# Patient Record
Sex: FEMALE | Race: BLACK OR AFRICAN AMERICAN | NOT HISPANIC OR LATINO | Employment: OTHER | URBAN - METROPOLITAN AREA
[De-identification: names, ages, dates, MRNs, and addresses within clinical notes are randomized per-mention and may not be internally consistent; named-entity substitution may affect disease eponyms.]

---

## 2018-09-28 LAB
HPV ABSTRACT: NORMAL
PAP-ABSTRACT: NORMAL

## 2018-10-01 ENCOUNTER — TRANSFERRED RECORDS (OUTPATIENT)
Dept: HEALTH INFORMATION MANAGEMENT | Facility: CLINIC | Age: 48
End: 2018-10-01

## 2019-03-22 ENCOUNTER — HEALTH MAINTENANCE LETTER (OUTPATIENT)
Age: 49
End: 2019-03-22

## 2019-04-03 ENCOUNTER — OFFICE VISIT (OUTPATIENT)
Dept: FAMILY MEDICINE | Facility: CLINIC | Age: 49
End: 2019-04-03
Payer: COMMERCIAL

## 2019-04-03 VITALS
TEMPERATURE: 98 F | HEART RATE: 77 BPM | SYSTOLIC BLOOD PRESSURE: 143 MMHG | BODY MASS INDEX: 26.48 KG/M2 | DIASTOLIC BLOOD PRESSURE: 94 MMHG | HEIGHT: 70 IN | WEIGHT: 185 LBS | OXYGEN SATURATION: 100 %

## 2019-04-03 DIAGNOSIS — R03.0 ELEVATED BLOOD-PRESSURE READING WITHOUT DIAGNOSIS OF HYPERTENSION: ICD-10-CM

## 2019-04-03 DIAGNOSIS — Z01.818 PREOP GENERAL PHYSICAL EXAM: Primary | ICD-10-CM

## 2019-04-03 DIAGNOSIS — D50.0 IRON DEFICIENCY ANEMIA DUE TO CHRONIC BLOOD LOSS: ICD-10-CM

## 2019-04-03 LAB
ANION GAP SERPL CALCULATED.3IONS-SCNC: 7 MMOL/L (ref 3–14)
BASOPHILS # BLD AUTO: 0 10E9/L (ref 0–0.2)
BASOPHILS NFR BLD AUTO: 0.4 %
BUN SERPL-MCNC: 11 MG/DL (ref 7–30)
CALCIUM SERPL-MCNC: 9.2 MG/DL (ref 8.5–10.1)
CHLORIDE SERPL-SCNC: 109 MMOL/L (ref 94–109)
CO2 SERPL-SCNC: 24 MMOL/L (ref 20–32)
CREAT SERPL-MCNC: 0.66 MG/DL (ref 0.52–1.04)
DIFFERENTIAL METHOD BLD: ABNORMAL
EOSINOPHIL # BLD AUTO: 0.1 10E9/L (ref 0–0.7)
EOSINOPHIL NFR BLD AUTO: 1.6 %
ERYTHROCYTE [DISTWIDTH] IN BLOOD BY AUTOMATED COUNT: 17.2 % (ref 10–15)
FERRITIN SERPL-MCNC: 4 NG/ML (ref 8–252)
GFR SERPL CREATININE-BSD FRML MDRD: >90 ML/MIN/{1.73_M2}
GLUCOSE SERPL-MCNC: 87 MG/DL (ref 70–99)
HCT VFR BLD AUTO: 35 % (ref 35–47)
HGB BLD-MCNC: 10.9 G/DL (ref 11.7–15.7)
IRON SATN MFR SERPL: 4 % (ref 15–46)
IRON SERPL-MCNC: 16 UG/DL (ref 35–180)
LYMPHOCYTES # BLD AUTO: 1.9 10E9/L (ref 0.8–5.3)
LYMPHOCYTES NFR BLD AUTO: 33.6 %
MCH RBC QN AUTO: 27.5 PG (ref 26.5–33)
MCHC RBC AUTO-ENTMCNC: 31.1 G/DL (ref 31.5–36.5)
MCV RBC AUTO: 88 FL (ref 78–100)
MONOCYTES # BLD AUTO: 0.8 10E9/L (ref 0–1.3)
MONOCYTES NFR BLD AUTO: 13.7 %
NEUTROPHILS # BLD AUTO: 2.8 10E9/L (ref 1.6–8.3)
NEUTROPHILS NFR BLD AUTO: 50.7 %
PLATELET # BLD AUTO: 329 10E9/L (ref 150–450)
POTASSIUM SERPL-SCNC: 4.1 MMOL/L (ref 3.4–5.3)
RBC # BLD AUTO: 3.96 10E12/L (ref 3.8–5.2)
RETICS # AUTO: 61.1 10E9/L (ref 25–95)
RETICS/RBC NFR AUTO: 1.5 % (ref 0.5–2)
SODIUM SERPL-SCNC: 140 MMOL/L (ref 133–144)
TIBC SERPL-MCNC: 373 UG/DL (ref 240–430)
WBC # BLD AUTO: 5.5 10E9/L (ref 4–11)

## 2019-04-03 PROCEDURE — 82728 ASSAY OF FERRITIN: CPT | Performed by: INTERNAL MEDICINE

## 2019-04-03 PROCEDURE — 85025 COMPLETE CBC W/AUTO DIFF WBC: CPT | Performed by: INTERNAL MEDICINE

## 2019-04-03 PROCEDURE — 80048 BASIC METABOLIC PNL TOTAL CA: CPT | Performed by: INTERNAL MEDICINE

## 2019-04-03 PROCEDURE — 85045 AUTOMATED RETICULOCYTE COUNT: CPT | Performed by: INTERNAL MEDICINE

## 2019-04-03 PROCEDURE — 36415 COLL VENOUS BLD VENIPUNCTURE: CPT | Performed by: INTERNAL MEDICINE

## 2019-04-03 PROCEDURE — 83550 IRON BINDING TEST: CPT | Performed by: INTERNAL MEDICINE

## 2019-04-03 PROCEDURE — 99204 OFFICE O/P NEW MOD 45 MIN: CPT | Performed by: INTERNAL MEDICINE

## 2019-04-03 PROCEDURE — 83540 ASSAY OF IRON: CPT | Performed by: INTERNAL MEDICINE

## 2019-04-03 ASSESSMENT — MIFFLIN-ST. JEOR: SCORE: 1549.4

## 2019-04-03 NOTE — PROGRESS NOTES
[address] [address]  [phone]  Dept: [phone]    PRE-OP EVALUATION:  Today's date: 4/3/2019    Tiffani Brasher (: 1970) presents for pre-operative evaluation assessment as requested by Vicki Parham.  She requires evaluation and anesthesia risk assessment prior to undergoing surgery/procedure for treatment of abnormal uterine bleeding.    Proposed Surgery/ Procedure: single site total laparoscopic hysterectomy, with bilateral salpingectomy and colposuspension and possible total abdominal hysterectomy  Date of Surgery/ Procedure: 2019  Time of Surgery/ Procedure: 7:30 AM  Hospital/Surgical Facility:  OR  Fax number for surgical facility: in [facility]  Primary Physician: No Ref-Primary, Physician  Type of Anesthesia Anticipated: General    Patient has a Health Care Directive or Living Will:  NO    1. NO - Do you have a history of heart attack, stroke, stent, bypass or surgery on an artery in the head, neck, heart or legs?  2. NO - Do you ever have any pain or discomfort in your chest?  3. NO - Do you have a history of  Heart Failure?  4. NO - Are you troubled by shortness of breath when: walking on the level, up a slight hill or at night?  5. NO - Do you currently have a cold, bronchitis or other respiratory infection?  6. NO - Do you have a cough, shortness of breath or wheezing?  7. NO - Do you sometimes get pains in the calves of your legs when you walk?  8. NO - Do you or anyone in your family have previous history of blood clots?  9. NO - Do you or does anyone in your family have a serious bleeding problem such as prolonged bleeding following surgeries or cuts?  10. NO - Have you ever had problems with anemia or been told to take iron pills?  11. NO - Have you had any abnormal blood loss such as black, tarry or bloody stools, or abnormal vaginal bleeding?  12. NO - Have you ever had a blood transfusion?  13. NO - Have you or any of your  "relatives ever had problems with anesthesia?  14. NO - Do you have sleep apnea, excessive snoring or daytime drowsiness?  15. NO - Do you have any prosthetic heart valves?  16. NO - Do you have prosthetic joints?  17. NO - Is there any chance that you may be pregnant?      HPI:     HPI related to upcoming procedure:     Patient has had a history of profuse abnormal uterine bleeding requiring blood transfusion and for which she underwent open myomectomy in the past.  Continues to experience heavy and prolonged menstrual periods resulting in iron deficiency anemia for which she had to receive parenteral iron.  Patient was then scheduled for elective total abdominal hysterectomy.      MEDICAL HISTORY:   There are no active problems to display for this patient.     History reviewed. No pertinent past medical history.  History reviewed. No pertinent surgical history.  No current outpatient medications on file.     OTC products: None, except as noted above    Allergies not on file   Latex Allergy: NO    Social History     Tobacco Use     Smoking status: Never Smoker     Smokeless tobacco: Never Used   Substance Use Topics     Alcohol use: Yes     History   Drug Use       REVIEW OF SYSTEMS:   C: NEGATIVE for fever, chills, change in weight  E/M: NEGATIVE for ear, mouth and throat problems  R: NEGATIVE for significant cough, (+) SOB on exertion  CV: NEGATIVE for chest pain, palpitations or peripheral edema  GI: NEGATIVE for heartburn or change in bowel habits, (+) abdominal pain (lower abdominal), (+) nausea  : NEGATIVE for frequency or hematuria, (+) occasional dysuria  N: NEGATIVE for weakness, dizziness or paresthesias  H: NEGATIVE for bleeding problems      EXAM:   BP (!) 143/94 (BP Location: Right arm, Patient Position: Chair, Cuff Size: Adult Large)   Pulse 77   Temp 98  F (36.7  C) (Oral)   Ht 1.778 m (5' 10\")   Wt 83.9 kg (185 lb)   SpO2 100%   Breastfeeding? No   BMI 26.54 kg/m        GENERAL APPEARANCE: " healthy, alert and no distress    NECK: no adenopathy, no asymmetry, masses, or scars and thyroid normal to palpation    RESP: lungs clear to auscultation - no rales, rhonchi or wheezes    CV: regular rates and rhythm, normal S1 S2, no S3 or S4 and no murmur, click or rub -    ABDOMEN:  soft, nontender, no HSM or masses and bowel sounds normal    NEURO: Normal strength and tone, sensory exam grossly normal, mentation intact and speech normal    PSYCH: mentation appears normal. and affect normal/bright    LYMPHATICS: No axillary, cervical, inguinal, or supraclavicular nodes      DIAGNOSTICS:     Labs Resulted Today:   Results for orders placed or performed in visit on 04/03/19   CBC with platelets differential   Result Value Ref Range    WBC 5.5 4.0 - 11.0 10e9/L    RBC Count 3.96 3.8 - 5.2 10e12/L    Hemoglobin 10.9 (L) 11.7 - 15.7 g/dL    Hematocrit 35.0 35.0 - 47.0 %    MCV 88 78 - 100 fl    MCH 27.5 26.5 - 33.0 pg    MCHC 31.1 (L) 31.5 - 36.5 g/dL    RDW 17.2 (H) 10.0 - 15.0 %    Platelet Count 329 150 - 450 10e9/L    % Neutrophils 50.7 %    % Lymphocytes 33.6 %    % Monocytes 13.7 %    % Eosinophils 1.6 %    % Basophils 0.4 %    Absolute Neutrophil 2.8 1.6 - 8.3 10e9/L    Absolute Lymphocytes 1.9 0.8 - 5.3 10e9/L    Absolute Monocytes 0.8 0.0 - 1.3 10e9/L    Absolute Eosinophils 0.1 0.0 - 0.7 10e9/L    Absolute Basophils 0.0 0.0 - 0.2 10e9/L    Diff Method Automated Method    Basic metabolic panel   Result Value Ref Range    Sodium 140 133 - 144 mmol/L    Potassium 4.1 3.4 - 5.3 mmol/L    Chloride 109 94 - 109 mmol/L    Carbon Dioxide 24 20 - 32 mmol/L    Anion Gap 7 3 - 14 mmol/L    Glucose 87 70 - 99 mg/dL    Urea Nitrogen 11 7 - 30 mg/dL    Creatinine 0.66 0.52 - 1.04 mg/dL    GFR Estimate >90 >60 mL/min/[1.73_m2]    GFR Estimate If Black >90 >60 mL/min/[1.73_m2]    Calcium 9.2 8.5 - 10.1 mg/dL   Ferritin   Result Value Ref Range    Ferritin 4 (L) 8 - 252 ng/mL   Iron and iron binding capacity   Result Value  Ref Range    Iron 16 (L) 35 - 180 ug/dL    Iron Binding Cap 373 240 - 430 ug/dL    Iron Saturation Index 4 (L) 15 - 46 %   Reticulocyte count   Result Value Ref Range    % Retic 1.5 0.5 - 2.0 %    Absolute Retic 61.1 25 - 95 10e9/L       No results for input(s): HGB, PLT, INR, NA, POTASSIUM, CR, A1C in the last 30117 hours.     IMPRESSION:   Diagnosis/reason for consult: abnormal uterine bleeding    The proposed surgical procedure is considered LOW risk.    REVISED CARDIAC RISK INDEX  The patient has the following serious cardiovascular risks for perioperative complications such as (MI, PE, VFib and 3  AV Block):  No serious cardiac risks  INTERPRETATION: 0 risks: Class I (very low risk - 0.4% complication rate)    The patient has the following additional risks for perioperative complications:  No identified additional risks      ICD-10-CM    1. Preop general physical exam Z01.818 Basic metabolic panel   2. Iron deficiency anemia due to chronic blood loss D50.0 CBC with platelets differential     Ferritin     Iron and iron binding capacity     Reticulocyte count   3. Elevated blood-pressure reading without diagnosis of hypertension -- likely due to pain R03.0        RECOMMENDATIONS:       APPROVAL GIVEN to proceed with proposed procedure, without further diagnostic evaluation       Signed Electronically by: Gonzalo Thompson MD    Copy of this evaluation report is provided to requesting physician.    [facility] Preop Guidelines    Revised Cardiac Risk Index

## 2019-04-09 PROBLEM — D50.9 ANEMIA, IRON DEFICIENCY: Status: ACTIVE | Noted: 2019-04-09

## 2019-04-10 ENCOUNTER — TELEPHONE (OUTPATIENT)
Dept: FAMILY MEDICINE | Facility: CLINIC | Age: 49
End: 2019-04-10

## 2019-04-10 NOTE — TELEPHONE ENCOUNTER
Reason for call:  Other   Patient called regarding (reason for call): appointment  Additional comments: Pt said that she needs to get into see Dr Thompson for a Physical for sometime before 04/22/2019, please call. She is scheduled for 04/11/2019, but will more than likely have to cancel that appt, due to the weather.     Phone number to reach patient:  Home number on file [phone] (home)    Best Time:  any    Can we leave a detailed message on this number?  YES

## 2019-04-11 ENCOUNTER — TELEPHONE (OUTPATIENT)
Dept: FAMILY MEDICINE | Facility: CLINIC | Age: 49
End: 2019-04-11

## 2019-04-11 ENCOUNTER — TELEPHONE (OUTPATIENT)
Dept: INFUSION THERAPY | Facility: CLINIC | Age: 49
End: 2019-04-11

## 2019-04-11 ENCOUNTER — OFFICE VISIT (OUTPATIENT)
Dept: FAMILY MEDICINE | Facility: CLINIC | Age: 49
End: 2019-04-11
Payer: COMMERCIAL

## 2019-04-11 VITALS
DIASTOLIC BLOOD PRESSURE: 81 MMHG | TEMPERATURE: 97.6 F | SYSTOLIC BLOOD PRESSURE: 116 MMHG | BODY MASS INDEX: 40.34 KG/M2 | HEART RATE: 74 BPM | WEIGHT: 281.8 LBS | HEIGHT: 70 IN | OXYGEN SATURATION: 100 %
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
## 2019-04-11 DIAGNOSIS — Z13.220 LIPID SCREENING: ICD-10-CM

## 2019-04-11 DIAGNOSIS — E66.01 MORBID OBESITY (H): ICD-10-CM

## 2019-04-11 DIAGNOSIS — M54.89 MIDLINE BACK PAIN, UNSPECIFIED BACK LOCATION, UNSPECIFIED CHRONICITY: ICD-10-CM

## 2019-04-11 DIAGNOSIS — E61.1 IRON DEFICIENCY: ICD-10-CM

## 2019-04-11 DIAGNOSIS — F41.8 DEPRESSION WITH ANXIETY: ICD-10-CM

## 2019-04-11 DIAGNOSIS — G44.319 ACUTE POST-TRAUMATIC HEADACHE, NOT INTRACTABLE: ICD-10-CM

## 2019-04-11 DIAGNOSIS — Z00.00 ROUTINE HISTORY AND PHYSICAL EXAMINATION OF ADULT: Primary | ICD-10-CM

## 2019-04-11 DIAGNOSIS — Z11.4 SCREENING FOR HIV (HUMAN IMMUNODEFICIENCY VIRUS): ICD-10-CM

## 2019-04-11 DIAGNOSIS — M54.2 NECK PAIN: ICD-10-CM

## 2019-04-11 PROCEDURE — 36415 COLL VENOUS BLD VENIPUNCTURE: CPT | Performed by: INTERNAL MEDICINE

## 2019-04-11 PROCEDURE — 84443 ASSAY THYROID STIM HORMONE: CPT | Performed by: INTERNAL MEDICINE

## 2019-04-11 PROCEDURE — 80076 HEPATIC FUNCTION PANEL: CPT | Performed by: INTERNAL MEDICINE

## 2019-04-11 PROCEDURE — 99396 PREV VISIT EST AGE 40-64: CPT | Performed by: INTERNAL MEDICINE

## 2019-04-11 PROCEDURE — 87389 HIV-1 AG W/HIV-1&-2 AB AG IA: CPT | Performed by: INTERNAL MEDICINE

## 2019-04-11 PROCEDURE — 80061 LIPID PANEL: CPT | Performed by: INTERNAL MEDICINE

## 2019-04-11 ASSESSMENT — MIFFLIN-ST. JEOR: SCORE: 1988.49

## 2019-04-11 NOTE — TELEPHONE ENCOUNTER
Please provide patient with number to infusion center for her iron infusion (patient states that nobody has contacted her yet)

## 2019-04-11 NOTE — PROGRESS NOTES
SUBJECTIVE:   CC: Tiffani Brasher is an 48 year old woman who presents for preventive health visit.       Had a recent fall type accident where and she hit her head.  No loss of consciousness or development of focal weakness/numbness.  No changes in vision/speech.  Has been experiencing headache, neck pain, and midline back pain since then but these are improving.      Healthy Habits:     Getting at least 3 servings of Calcium per day:  Yes    Bi-annual eye exam:  Yes    Dental care twice a year:  NO    Sleep apnea or symptoms of sleep apnea:  Excessive snoring    Diet:  Regular (no restrictions)    Frequency of exercise:  None    Duration of exercise:  N/A    Taking medications regularly:  Not Applicable    Barriers to taking medications:  Not applicable    Medication side effects:  Not applicable    PHQ-2 Total Score: 1    Additional concerns today:  No      Today's PHQ-2 Score:   PHQ-2 ( 1999 [employer]) 4/11/2019   Q1: Little interest or pleasure in doing things 0   Q2: Feeling down, depressed or hopeless 1   PHQ-2 Score 1       Abuse: Current or Past(Physical, Sexual or Emotional)- No  Do you feel safe in your environment? No    Social History     Tobacco Use     Smoking status: Never Smoker     Smokeless tobacco: Never Used   Substance Use Topics     Alcohol use: Yes     Comment: Occas       No flowsheet data found.    Reviewed orders with patient.  Reviewed health maintenance and updated orders accordingly - Yes      Mammogram Screening: Patient under age 50, mutual decision reflected in health maintenance.      Pertinent mammograms are reviewed under the imaging tab.  History of abnormal Pap smear: NO - age 30- 65 PAP every 3 years recommended     Reviewed and updated as needed this visit by clinical staff    Reviewed and updated as needed this visit by Provider        Past Medical History:   Diagnosis Date     Anemia        Past Surgical History:   Procedure Laterality Date     GYN SURGERY  2007     "myomectomy     LAPAROSCOPIC HYSTERECTOMY TOTAL N/A 4/23/2019    Procedure: single site total laparoscopic hysterectomy, lysis of adhesion, drainage of ovarian cyst;  Surgeon: Vicki Hamilton MD;  Location: [facility] OR     ORTHOPEDIC SURGERY Right        History reviewed. No pertinent family history.    Social History     Tobacco Use     Smoking status: Never Smoker     Smokeless tobacco: Never Used   Substance Use Topics     Alcohol use: Yes     Comment: Occas       Review of Systems   Constitutional: Negative for chills, fatigue, fever and unexpected weight change.   HENT: Negative for congestion, ear pain, hearing loss, sore throat and trouble swallowing.    Eyes: Negative for pain and visual disturbance.   Respiratory: Negative for cough and shortness of breath.    Cardiovascular: Negative for chest pain and palpitations.   Gastrointestinal: Negative for abdominal pain, blood in stool, constipation, diarrhea, nausea and vomiting.   Genitourinary: Negative for difficulty urinating.   Musculoskeletal: Positive for back pain and neck pain. Negative for arthralgias and myalgias.   Skin: Negative for rash.   Neurological: Positive for headaches. Negative for dizziness, seizures, syncope, facial asymmetry, weakness, light-headedness and numbness.   Psychiatric/Behavioral: Negative for behavioral problems, hallucinations and sleep disturbance. The patient is not nervous/anxious.         OBJECTIVE:   /81 (BP Location: Left arm, Patient Position: Sitting, Cuff Size: Adult Large)   Pulse 74   Temp 97.6  F (36.4  C) (Oral)   Ht 1.778 m (5' 10\")   Wt 127.8 kg (281 lb 12.8 oz)   LMP 04/04/2019   SpO2 100%   BMI 40.43 kg/m      Physical Exam   Constitutional: She is oriented to person, place, and time. No distress.   HENT:   Head: Atraumatic.   Right Ear: External ear normal.   Left Ear: External ear normal.   Nose: Nose normal.   Mouth/Throat: Oropharynx is clear and moist.   Eyes: Pupils are equal, round, and " reactive to light. Conjunctivae and EOM are normal.   Neck: Normal range of motion. Neck supple. No thyromegaly present.   Cardiovascular: Normal rate, regular rhythm and normal heart sounds.   Pulmonary/Chest: Effort normal and breath sounds normal. No respiratory distress.   Abdominal: Soft. Bowel sounds are normal. There is no tenderness.   Musculoskeletal: Normal range of motion. She exhibits no edema or tenderness.   Lymphadenopathy:     She has no cervical adenopathy.   Neurological: She is alert and oriented to person, place, and time. She has normal reflexes. Coordination normal.   Skin: No rash noted.   Nursing note and vitals reviewed.      ASSESSMENT/PLAN:       ICD-10-CM    1. Routine history and physical examination of adult Z00.00    2. Depression with anxiety F41.8 TSH with free T4 reflex   3. Iron deficiency E61.1  receiving parenteral iron infusion center   4. Fall, initial encounter W19.XXXA    5. Acute post-traumatic headache, not intractable G44.319  likely postconcussion, see patient instructions below   6. Neck pain M54.2  continue conservative management with rest   7. Midline back pain, unspecified back location, unspecified chronicity M54.89  continue conservative management with rest and avoidance of activities that would aggravate her back pain   8. Lipid screening Z13.220 Lipid panel reflex to direct LDL Fasting   9. Screening for HIV (human immunodeficiency virus) Z11.4 HIV Screening   10. Morbid obesity (H) E66.01 TSH with free T4 reflex     Hepatic panel       Patient Instructions   Continue to follow up with your counselor.  Call doctor if your depression/anxiety persist/worsens, or if you develop new symptoms.    Seek immediate medical attention if you develop:  - severe/worsening headache or neck/back pain  - fever/chills  - nausea/vomiting  - changes in vision  - slurring of speech  - disorientation/confusion  - increased somnolence  - numbness/weakness of arm or leg  - any other  unusual symptoms      Patient Education     Concussion    A concussion is a type of brain injury. It can be caused by a direct hit or blow to the head, neck, face, or body. The force of the blow makes the head and brain shake quickly back and forth. In some cases you may lose consciousness. Depending on the severity of the blow, it will take from a few hours up to a few days to get better. Sometimes symptoms may last a few months or longer. This is called post-concussion syndrome.  At first, you may have a headache, nausea, vomiting, or dizziness. You may also have problems concentrating or remembering things. This is normal.  Symptoms should get better as the hours and days go by. Symptoms that get worse could be a sign of a more serious brain injury. This might be a bruise or bleeding in the brain. That s why it s important to watch for the warning signs listed below.  School-age children are more at risk for symptoms that don t go away after a concussion. They should be watched very closely.   Home care  If your injury is mild and there are no serious signs or symptoms, your healthcare provider may recommend that you be watched at home. If there is evidence that the injury is more serious, you will be watched in the hospital. Follow these tips to help care for yourself at home:    After a concussion, your healthcare provider may recommend that a family member or friend watched you for 12 to 24 hours. They may be told to wake you every few hours during sleep to check for the signs below.    If your face or scalp swells, apply an ice pack for 20 minutes every 1 to 2 hours. Do this until the swelling starts to go down. To make an ice pack, put ice cubes in a plastic bag that seals at the top. Wrap the bag in a clean, thin towel or cloth. Never put ice or an ice pack directly on the skin.    You may use acetaminophen to control pain, unless another pain medicine was prescribed. Don't use aspirin or ibuprofen after a  head injury. If you have long-lasting (chronic) liver or kidney disease, talk with your healthcare provider before using these medicines. Also talk with your provider if you ever had a stomach ulcer or gastrointestinal bleeding.    For the next 24 hours:  ? Don t drink alcohol or take sedatives or medicines that make you sleepy.  ? Don t drive or operate machinery.  ? Don't do anything strenuous. Don t lift or strain.    Don t return right away to sports or to any activity where you could hit your head. Wait until all symptoms are gone and you have been cleared by your healthcare provider. Having a second head injury before you fully recover from the first one can lead to serious brain injury.    After a few days, it s OK to go back to your normal daily activities. But don t do anything that could cause your head to be hit again.  Follow-up care  Follow up with your healthcare provider in 1 week, or as directed.  A radiologist will review any X-rays or CT scans that were taken. You will be told of any new findings that may affect your care.  When to seek medical advice  Call your healthcare provider right away if any of these occur:    Headache or dizziness that won t go away    Redness, warmth, or pus from the swollen area  Call 911  Call 911 or get medical care right away if any of these occur:    Repeated vomiting (it s common to vomit once after a head injury)    Headache or dizziness that is severe or gets worse    Loss of consciousness    Unusual drowsiness, or unable to wake up as usual    Weakness or decreased ability to walk or move any limb    Confusion, agitation, or change in behavior or speech, or memory loss    Blurred vision    Convulsion (seizure)    Swelling on the scalp or face that gets worse    Changes in pupil size (the black part of the eye)    Fluid draining from or bleeding from the nose or ears  Date Last Reviewed: 6/1/2018 2000-2018 [employer] [address] [facility],  "[place] [address]. All rights reserved. This information is not intended as a substitute for professional medical care. Always follow your healthcare professional's instructions.           COUNSELING:  Reviewed preventive health counseling, as reflected in patient instructions    BP Readings from Last 1 Encounters:   04/23/19 117/78     Estimated body mass index is 40.43 kg/m  as calculated from the following:    Height as of this encounter: 1.778 m (5' 10\").    Weight as of this encounter: 127.8 kg (281 lb 12.8 oz).      Weight management plan: Discussed healthy diet and exercise guidelines     reports that she has never smoked. She has never used smokeless tobacco.      Counseling Resources:  ATP IV Guidelines  Pooled Cohorts Equation Calculator  Breast Cancer Risk Calculator  FRAX Risk Assessment  ICSI Preventive Guidelines  Dietary Guidelines for Americans, 2010  USDA's MyPlate  ASA Prophylaxis  Lung CA Screening    Gonzalo Thompson MD  [facility]  "

## 2019-04-11 NOTE — TELEPHONE ENCOUNTER
Patient needs to be seen before 4/22/19. Only slots open are Same Day slots or Hospital follow up. Please advise.    Dionicio Schwab CMA on 4/11/2019 at 9:01 AM

## 2019-04-11 NOTE — TELEPHONE ENCOUNTER
Left message asking patient to call back OR view MyChart message     [id number] message sent giving patient the number to call for the infusion center    Also forwarding encounter to infusion scheduling     Tika KRAMER RN

## 2019-04-11 NOTE — LETTER
My Depression Action Plan  Name: Tiffani Brasher   Date of Birth 1970  Date: 4/11/2019    My doctor: Gonzalo Thompson   My clinic: [place] [address] [address] [facility] [address]  [phone]          GREEN    ZONE   Good Control    What it looks like:     Things are going generally well. You have normal up s and down s. You may even feel depressed from time to time, but bad moods usually last less than a day.   What you need to do:  1. Continue to care for yourself (see self care plan)  2. Check your depression survival kit and update it as needed  3. Follow your physician s recommendations including any medication.  4. Do not stop taking medication unless you consult with your physician first.           YELLOW         ZONE Getting Worse    What it looks like:     Depression is starting to interfere with your life.     It may be hard to get out of bed; you may be starting to isolate yourself from others.    Symptoms of depression are starting to last most all day and this has happened for several days.     You may have suicidal thoughts but they are not constant.   What you need to do:     1. Call your care team, your response to treatment will improve if you keep your care team informed of your progress. Yellow periods are signs an adjustment may need to be made.     2. Continue your self-care, even if you have to fake it!    3. Talk to someone in your support network    4. Open up your depression survival kit           RED    ZONE Medical Alert - Get Help    What it looks like:     Depression is seriously interfering with your life.     You may experience these or other symptoms: You can t get out of bed most days, can t work or engage in other necessary activities, you have trouble taking care of basic hygiene, or basic responsibilities, thoughts of suicide or death that will not go away, self-injurious behavior.     What you need to do:  1. Call your care  team and request a same-day appointment. If they are not available (weekends or after hours) call your local crisis line, emergency room or 911.            Depression Self Care Plan / Survival Kit    Self-Care for Depression  Here s the deal. Your body and mind are really not as separate as most people think.  What you do and think affects how you feel and how you feel influences what you do and think. This means if you do things that people who feel good do, it will help you feel better.  Sometimes this is all it takes.  There is also a place for medication and therapy depending on how severe your depression is, so be sure to consult with your medical provider and/ or Behavioral Health Consultant if your symptoms are worsening or not improving.     In order to better manage my stress, I will:    Exercise  Get some form of exercise, every day. This will help reduce pain and release endorphins, the  feel good  chemicals in your brain. This is almost as good as taking antidepressants!  This is not the same as joining a gym and then never going! (they count on that by the way ) It can be as simple as just going for a walk or doing some gardening, anything that will get you moving.      Hygiene   Maintain good hygiene (Get out of bed in the morning, Make your bed, Brush your teeth, Take a shower, and Get dressed like you were going to work, even if you are unemployed).  If your clothes don't fit try to get ones that do.    Diet  I will strive to eat foods that are good for me, drink plenty of water, and avoid excessive sugar, caffeine, alcohol, and other mood-altering substances.  Some foods that are helpful in depression are: complex carbohydrates, B vitamins, flaxseed, fish or fish oil, fresh fruits and vegetables.    Psychotherapy  I agree to participate in Individual Therapy (if recommended).    Medication  If prescribed medications, I agree to take them.  Missing doses can result in serious side effects.  I  understand that drinking alcohol, or other illicit drug use, may cause potential side effects.  I will not stop my medication abruptly without first discussing it with my provider.    Staying Connected With Others  I will stay in touch with my friends, family members, and my primary care provider/team.    Use your imagination  Be creative.  We all have a creative side; it doesn t matter if it s oil painting, sand castles, or mud pies! This will also kick up the endorphins.    Witness Beauty  (AKA stop and smell the roses) Take a look outside, even in mid-winter. Notice colors, textures. Watch the squirrels and birds.     Service to others  Be of service to others.  There is always someone else in need.  By helping others we can  get out of ourselves  and remember the really important things.  This also provides opportunities for practicing all the other parts of the program.    Humor  Laugh and be silly!  Adjust your TV habits for less news and crime-drama and more comedy.    Control your stress  Try breathing deep, massage therapy, biofeedback, and meditation. Find time to relax each day.     My support system    Clinic Contact:  Phone number:    Contact 1:  Phone number:    Contact 2:  Phone number:    [employer]/:  Phone number:    Therapist:  Phone number:    Local crisis center:    Phone number:    Other community support:  Phone number:

## 2019-04-11 NOTE — TELEPHONE ENCOUNTER
Left voicemail message for patient requesting a return call regarding scheduling appointments. NEW ORDER

## 2019-04-11 NOTE — PATIENT INSTRUCTIONS
Continue to follow up with your counselor.  Call doctor if your depression/anxiety persist/worsens, or if you develop new symptoms.    Seek immediate medical attention if you develop:  - severe/worsening headache or neck/back pain  - fever/chills  - nausea/vomiting  - changes in vision  - slurring of speech  - disorientation/confusion  - increased somnolence  - numbness/weakness of arm or leg  - any other unusual symptoms      Patient Education     Concussion    A concussion is a type of brain injury. It can be caused by a direct hit or blow to the head, neck, face, or body. The force of the blow makes the head and brain shake quickly back and forth. In some cases you may lose consciousness. Depending on the severity of the blow, it will take from a few hours up to a few days to get better. Sometimes symptoms may last a few months or longer. This is called post-concussion syndrome.  At first, you may have a headache, nausea, vomiting, or dizziness. You may also have problems concentrating or remembering things. This is normal.  Symptoms should get better as the hours and days go by. Symptoms that get worse could be a sign of a more serious brain injury. This might be a bruise or bleeding in the brain. That s why it s important to watch for the warning signs listed below.  School-age children are more at risk for symptoms that don t go away after a concussion. They should be watched very closely.   Home care  If your injury is mild and there are no serious signs or symptoms, your healthcare provider may recommend that you be watched at home. If there is evidence that the injury is more serious, you will be watched in the hospital. Follow these tips to help care for yourself at home:    After a concussion, your healthcare provider may recommend that a family member or friend watched you for 12 to 24 hours. They may be told to wake you every few hours during sleep to check for the signs below.    If your face or scalp  swells, apply an ice pack for 20 minutes every 1 to 2 hours. Do this until the swelling starts to go down. To make an ice pack, put ice cubes in a plastic bag that seals at the top. Wrap the bag in a clean, thin towel or cloth. Never put ice or an ice pack directly on the skin.    You may use acetaminophen to control pain, unless another pain medicine was prescribed. Don't use aspirin or ibuprofen after a head injury. If you have long-lasting (chronic) liver or kidney disease, talk with your healthcare provider before using these medicines. Also talk with your provider if you ever had a stomach ulcer or gastrointestinal bleeding.    For the next 24 hours:  ? Don t drink alcohol or take sedatives or medicines that make you sleepy.  ? Don t drive or operate machinery.  ? Don't do anything strenuous. Don t lift or strain.    Don t return right away to sports or to any activity where you could hit your head. Wait until all symptoms are gone and you have been cleared by your healthcare provider. Having a second head injury before you fully recover from the first one can lead to serious brain injury.    After a few days, it s OK to go back to your normal daily activities. But don t do anything that could cause your head to be hit again.  Follow-up care  Follow up with your healthcare provider in 1 week, or as directed.  A radiologist will review any X-rays or CT scans that were taken. You will be told of any new findings that may affect your care.  When to seek medical advice  Call your healthcare provider right away if any of these occur:    Headache or dizziness that won t go away    Redness, warmth, or pus from the swollen area  Call 911  Call 911 or get medical care right away if any of these occur:    Repeated vomiting (it s common to vomit once after a head injury)    Headache or dizziness that is severe or gets worse    Loss of consciousness    Unusual drowsiness, or unable to wake up as usual    Weakness or  decreased ability to walk or move any limb    Confusion, agitation, or change in behavior or speech, or memory loss    Blurred vision    Convulsion (seizure)    Swelling on the scalp or face that gets worse    Changes in pupil size (the black part of the eye)    Fluid draining from or bleeding from the nose or ears  Date Last Reviewed: 6/1/2018 2000-2018 [employer]. [address] [place] [address]. All rights reserved. This information is not intended as a substitute for professional medical care. Always follow your healthcare professional's instructions.

## 2019-04-12 LAB
ALBUMIN SERPL-MCNC: 3.6 G/DL (ref 3.4–5)
ALP SERPL-CCNC: 64 U/L (ref 40–150)
ALT SERPL W P-5'-P-CCNC: 15 U/L (ref 0–50)
AST SERPL W P-5'-P-CCNC: 9 U/L (ref 0–45)
BILIRUB DIRECT SERPL-MCNC: <0.1 MG/DL (ref 0–0.2)
BILIRUB SERPL-MCNC: 0.3 MG/DL (ref 0.2–1.3)
CHOLEST SERPL-MCNC: 193 MG/DL
HDLC SERPL-MCNC: 58 MG/DL
HIV 1+2 AB+HIV1 P24 AG SERPL QL IA: NONREACTIVE
LDLC SERPL CALC-MCNC: 110 MG/DL
NONHDLC SERPL-MCNC: 132 MG/DL
PROT SERPL-MCNC: 7.6 G/DL (ref 6.8–8.8)
TRIGL SERPL-MCNC: 110 MG/DL
TSH SERPL DL<=0.005 MIU/L-ACNC: 1 MU/L (ref 0.4–4)

## 2019-04-18 ENCOUNTER — INFUSION THERAPY VISIT (OUTPATIENT)
Dept: INFUSION THERAPY | Facility: CLINIC | Age: 49
End: 2019-04-18
Attending: INTERNAL MEDICINE
Payer: COMMERCIAL

## 2019-04-18 VITALS
HEART RATE: 82 BPM | SYSTOLIC BLOOD PRESSURE: 133 MMHG | RESPIRATION RATE: 18 BRPM | TEMPERATURE: 98.2 F | DIASTOLIC BLOOD PRESSURE: 88 MMHG

## 2019-04-18 DIAGNOSIS — D50.0 IRON DEFICIENCY ANEMIA DUE TO CHRONIC BLOOD LOSS: Primary | ICD-10-CM

## 2019-04-18 PROCEDURE — 25000128 H RX IP 250 OP 636: Performed by: INTERNAL MEDICINE

## 2019-04-18 PROCEDURE — 96365 THER/PROPH/DIAG IV INF INIT: CPT

## 2019-04-18 PROCEDURE — 25800030 ZZH RX IP 258 OP 636: Performed by: INTERNAL MEDICINE

## 2019-04-18 RX ADMIN — FERRIC CARBOXYMALTOSE INJECTION 750 MG: 50 INJECTION, SOLUTION INTRAVENOUS at 15:39

## 2019-04-18 RX ADMIN — SODIUM CHLORIDE 250 ML: 9 INJECTION, SOLUTION INTRAVENOUS at 15:39

## 2019-04-18 ASSESSMENT — PAIN SCALES - GENERAL: PAINLEVEL: MILD PAIN (3)

## 2019-04-18 NOTE — PROGRESS NOTES
Infusion Nursing Note:  Tiffani Brasher presents today for injectafer.    Patient seen by provider today: No   present during visit today: Not Applicable.    Note: N/A.    Intravenous Access:  Peripheral IV placed.    Treatment Conditions:  Not Applicable.      Post Infusion Assessment:  Patient tolerated infusion without incident.  Patient observed for 30 minutes post injectafer per protocol.  Blood return noted pre and post infusion.  Site patent and intact, free from redness, edema or discomfort.  No evidence of extravasations.  Access discontinued per protocol.       Discharge Plan:   Discharge instructions reviewed with: Patient.  Patient and/or family verbalized understanding of discharge instructions and all questions answered.  AVS to patient via [employer].  Patient is contacting doctor about 2nd infusion prior to surgery next Tuesday. Patient discharged in stable condition accompanied by: friend.  Departure Mode: Ambulatory.    Gee Valadez RN

## 2019-04-23 ENCOUNTER — ANESTHESIA EVENT (OUTPATIENT)
Dept: SURGERY | Facility: CLINIC | Age: 49
End: 2019-04-23
Payer: COMMERCIAL

## 2019-04-23 ENCOUNTER — ANESTHESIA (OUTPATIENT)
Dept: SURGERY | Facility: CLINIC | Age: 49
End: 2019-04-23
Payer: COMMERCIAL

## 2019-04-23 ENCOUNTER — HOSPITAL ENCOUNTER (OUTPATIENT)
Facility: CLINIC | Age: 49
Discharge: HOME | End: 2019-04-23
Attending: OBSTETRICS & GYNECOLOGY | Admitting: OBSTETRICS & GYNECOLOGY
Payer: COMMERCIAL

## 2019-04-23 VITALS
SYSTOLIC BLOOD PRESSURE: 117 MMHG | RESPIRATION RATE: 18 BRPM | OXYGEN SATURATION: 98 % | HEIGHT: 70 IN | BODY MASS INDEX: 40.23 KG/M2 | WEIGHT: 281 LBS | TEMPERATURE: 98.2 F | DIASTOLIC BLOOD PRESSURE: 78 MMHG | HEART RATE: 91 BPM

## 2019-04-23 DIAGNOSIS — E66.01 MORBID OBESITY (H): Primary | ICD-10-CM

## 2019-04-23 LAB
ABO + RH BLD: NORMAL
ABO + RH BLD: NORMAL
BLD GP AB SCN SERPL QL: NORMAL
BLOOD BANK CMNT PATIENT-IMP: NORMAL
HCG UR QL: NEGATIVE
HGB BLD-MCNC: 10.9 G/DL (ref 11.7–15.7)
HGB BLD-MCNC: 9.7 G/DL (ref 11.7–15.7)
SPECIMEN EXP DATE BLD: NORMAL

## 2019-04-23 PROCEDURE — 88307 TISSUE EXAM BY PATHOLOGIST: CPT | Mod: 26 | Performed by: OBSTETRICS & GYNECOLOGY

## 2019-04-23 PROCEDURE — 25000128 H RX IP 250 OP 636: Performed by: OBSTETRICS & GYNECOLOGY

## 2019-04-23 PROCEDURE — 71000013 ZZH RECOVERY PHASE 1 LEVEL 1 EA ADDTL HR: Performed by: OBSTETRICS & GYNECOLOGY

## 2019-04-23 PROCEDURE — 37000009 ZZH ANESTHESIA TECHNICAL FEE, EACH ADDTL 15 MIN: Performed by: OBSTETRICS & GYNECOLOGY

## 2019-04-23 PROCEDURE — 71000012 ZZH RECOVERY PHASE 1 LEVEL 1 FIRST HR: Performed by: OBSTETRICS & GYNECOLOGY

## 2019-04-23 PROCEDURE — 86900 BLOOD TYPING SEROLOGIC ABO: CPT | Performed by: ANESTHESIOLOGY

## 2019-04-23 PROCEDURE — 36415 COLL VENOUS BLD VENIPUNCTURE: CPT | Performed by: ANESTHESIOLOGY

## 2019-04-23 PROCEDURE — 81025 URINE PREGNANCY TEST: CPT | Performed by: ANESTHESIOLOGY

## 2019-04-23 PROCEDURE — 86850 RBC ANTIBODY SCREEN: CPT | Performed by: ANESTHESIOLOGY

## 2019-04-23 PROCEDURE — 71000027 ZZH RECOVERY PHASE 2 EACH 15 MINS: Performed by: OBSTETRICS & GYNECOLOGY

## 2019-04-23 PROCEDURE — 25000128 H RX IP 250 OP 636: Performed by: NURSE ANESTHETIST, CERTIFIED REGISTERED

## 2019-04-23 PROCEDURE — 36000063 ZZH SURGERY LEVEL 4 EA 15 ADDTL MIN: Performed by: OBSTETRICS & GYNECOLOGY

## 2019-04-23 PROCEDURE — 25000125 ZZHC RX 250: Performed by: OBSTETRICS & GYNECOLOGY

## 2019-04-23 PROCEDURE — 88307 TISSUE EXAM BY PATHOLOGIST: CPT | Performed by: OBSTETRICS & GYNECOLOGY

## 2019-04-23 PROCEDURE — 40000306 ZZH STATISTIC PRE PROC ASSESS II: Performed by: OBSTETRICS & GYNECOLOGY

## 2019-04-23 PROCEDURE — 25800030 ZZH RX IP 258 OP 636: Performed by: ANESTHESIOLOGY

## 2019-04-23 PROCEDURE — 36000093 ZZH SURGERY LEVEL 4 1ST 30 MIN: Performed by: OBSTETRICS & GYNECOLOGY

## 2019-04-23 PROCEDURE — 25000125 ZZHC RX 250: Performed by: NURSE ANESTHETIST, CERTIFIED REGISTERED

## 2019-04-23 PROCEDURE — 85018 HEMOGLOBIN: CPT | Mod: 91 | Performed by: ANESTHESIOLOGY

## 2019-04-23 PROCEDURE — 37000008 ZZH ANESTHESIA TECHNICAL FEE, 1ST 30 MIN: Performed by: OBSTETRICS & GYNECOLOGY

## 2019-04-23 PROCEDURE — 25000132 ZZH RX MED GY IP 250 OP 250 PS 637: Performed by: OBSTETRICS & GYNECOLOGY

## 2019-04-23 PROCEDURE — 86901 BLOOD TYPING SEROLOGIC RH(D): CPT | Performed by: ANESTHESIOLOGY

## 2019-04-23 PROCEDURE — 27210794 ZZH OR GENERAL SUPPLY STERILE: Performed by: OBSTETRICS & GYNECOLOGY

## 2019-04-23 PROCEDURE — 25800030 ZZH RX IP 258 OP 636: Performed by: NURSE ANESTHETIST, CERTIFIED REGISTERED

## 2019-04-23 PROCEDURE — 25000128 H RX IP 250 OP 636: Performed by: ANESTHESIOLOGY

## 2019-04-23 RX ORDER — OXYCODONE HYDROCHLORIDE 5 MG/1
5 TABLET ORAL
Status: COMPLETED | OUTPATIENT
Start: 2019-04-23 | End: 2019-04-23

## 2019-04-23 RX ORDER — NALOXONE HYDROCHLORIDE 0.4 MG/ML
.1-.4 INJECTION, SOLUTION INTRAMUSCULAR; INTRAVENOUS; SUBCUTANEOUS
Status: DISCONTINUED | OUTPATIENT
Start: 2019-04-23 | End: 2019-04-23 | Stop reason: HOSPADM

## 2019-04-23 RX ORDER — SODIUM CHLORIDE, SODIUM LACTATE, POTASSIUM CHLORIDE, CALCIUM CHLORIDE 600; 310; 30; 20 MG/100ML; MG/100ML; MG/100ML; MG/100ML
INJECTION, SOLUTION INTRAVENOUS CONTINUOUS PRN
Status: DISCONTINUED | OUTPATIENT
Start: 2019-04-23 | End: 2019-04-23

## 2019-04-23 RX ORDER — DEXAMETHASONE SODIUM PHOSPHATE 4 MG/ML
4 INJECTION, SOLUTION INTRA-ARTICULAR; INTRALESIONAL; INTRAMUSCULAR; INTRAVENOUS; SOFT TISSUE EVERY 10 MIN PRN
Status: DISCONTINUED | OUTPATIENT
Start: 2019-04-23 | End: 2019-04-23 | Stop reason: HOSPADM

## 2019-04-23 RX ORDER — LIDOCAINE 40 MG/G
CREAM TOPICAL
Status: DISCONTINUED | OUTPATIENT
Start: 2019-04-23 | End: 2019-04-23 | Stop reason: HOSPADM

## 2019-04-23 RX ORDER — PROPOFOL 10 MG/ML
INJECTION, EMULSION INTRAVENOUS PRN
Status: DISCONTINUED | OUTPATIENT
Start: 2019-04-23 | End: 2019-04-23

## 2019-04-23 RX ORDER — KETOROLAC TROMETHAMINE 30 MG/ML
30 INJECTION, SOLUTION INTRAMUSCULAR; INTRAVENOUS EVERY 6 HOURS PRN
Status: DISCONTINUED | OUTPATIENT
Start: 2019-04-23 | End: 2019-04-23 | Stop reason: HOSPADM

## 2019-04-23 RX ORDER — SODIUM CHLORIDE, SODIUM LACTATE, POTASSIUM CHLORIDE, CALCIUM CHLORIDE 600; 310; 30; 20 MG/100ML; MG/100ML; MG/100ML; MG/100ML
INJECTION, SOLUTION INTRAVENOUS CONTINUOUS
Status: DISCONTINUED | OUTPATIENT
Start: 2019-04-23 | End: 2019-04-23 | Stop reason: HOSPADM

## 2019-04-23 RX ORDER — FENTANYL CITRATE 50 UG/ML
25-50 INJECTION, SOLUTION INTRAMUSCULAR; INTRAVENOUS
Status: DISCONTINUED | OUTPATIENT
Start: 2019-04-23 | End: 2019-04-23 | Stop reason: HOSPADM

## 2019-04-23 RX ORDER — LIDOCAINE HYDROCHLORIDE 10 MG/ML
INJECTION, SOLUTION INFILTRATION; PERINEURAL PRN
Status: DISCONTINUED | OUTPATIENT
Start: 2019-04-23 | End: 2019-04-23

## 2019-04-23 RX ORDER — METOCLOPRAMIDE HYDROCHLORIDE 5 MG/ML
10 INJECTION INTRAMUSCULAR; INTRAVENOUS EVERY 6 HOURS PRN
Status: DISCONTINUED | OUTPATIENT
Start: 2019-04-23 | End: 2019-04-23 | Stop reason: HOSPADM

## 2019-04-23 RX ORDER — ONDANSETRON 4 MG/1
4 TABLET, ORALLY DISINTEGRATING ORAL EVERY 30 MIN PRN
Status: DISCONTINUED | OUTPATIENT
Start: 2019-04-23 | End: 2019-04-23 | Stop reason: HOSPADM

## 2019-04-23 RX ORDER — DEXAMETHASONE SODIUM PHOSPHATE 4 MG/ML
INJECTION, SOLUTION INTRA-ARTICULAR; INTRALESIONAL; INTRAMUSCULAR; INTRAVENOUS; SOFT TISSUE PRN
Status: DISCONTINUED | OUTPATIENT
Start: 2019-04-23 | End: 2019-04-23

## 2019-04-23 RX ORDER — PROPOFOL 10 MG/ML
INJECTION, EMULSION INTRAVENOUS CONTINUOUS PRN
Status: DISCONTINUED | OUTPATIENT
Start: 2019-04-23 | End: 2019-04-23

## 2019-04-23 RX ORDER — CEFAZOLIN SODIUM 1 G/3ML
1 INJECTION, POWDER, FOR SOLUTION INTRAMUSCULAR; INTRAVENOUS SEE ADMIN INSTRUCTIONS
Status: DISCONTINUED | OUTPATIENT
Start: 2019-04-23 | End: 2019-04-23 | Stop reason: HOSPADM

## 2019-04-23 RX ORDER — MINERAL OIL
OIL (ML) MISCELLANEOUS PRN
Status: DISCONTINUED | OUTPATIENT
Start: 2019-04-23 | End: 2019-04-23 | Stop reason: HOSPADM

## 2019-04-23 RX ORDER — NEOSTIGMINE METHYLSULFATE 1 MG/ML
VIAL (ML) INJECTION PRN
Status: DISCONTINUED | OUTPATIENT
Start: 2019-04-23 | End: 2019-04-23

## 2019-04-23 RX ORDER — METOCLOPRAMIDE 10 MG/1
10 TABLET ORAL EVERY 6 HOURS PRN
Status: DISCONTINUED | OUTPATIENT
Start: 2019-04-23 | End: 2019-04-23 | Stop reason: HOSPADM

## 2019-04-23 RX ORDER — DIMENHYDRINATE 50 MG/ML
25 INJECTION, SOLUTION INTRAMUSCULAR; INTRAVENOUS
Status: DISCONTINUED | OUTPATIENT
Start: 2019-04-23 | End: 2019-04-23 | Stop reason: HOSPADM

## 2019-04-23 RX ORDER — LABETALOL 20 MG/4 ML (5 MG/ML) INTRAVENOUS SYRINGE
10
Status: DISCONTINUED | OUTPATIENT
Start: 2019-04-23 | End: 2019-04-23 | Stop reason: HOSPADM

## 2019-04-23 RX ORDER — ONDANSETRON 2 MG/ML
4 INJECTION INTRAMUSCULAR; INTRAVENOUS EVERY 30 MIN PRN
Status: DISCONTINUED | OUTPATIENT
Start: 2019-04-23 | End: 2019-04-23 | Stop reason: HOSPADM

## 2019-04-23 RX ORDER — HYDRALAZINE HYDROCHLORIDE 20 MG/ML
2.5-5 INJECTION INTRAMUSCULAR; INTRAVENOUS EVERY 10 MIN PRN
Status: DISCONTINUED | OUTPATIENT
Start: 2019-04-23 | End: 2019-04-23 | Stop reason: HOSPADM

## 2019-04-23 RX ORDER — LABETALOL 20 MG/4 ML (5 MG/ML) INTRAVENOUS SYRINGE
PRN
Status: DISCONTINUED | OUTPATIENT
Start: 2019-04-23 | End: 2019-04-23

## 2019-04-23 RX ORDER — BUPIVACAINE HYDROCHLORIDE AND EPINEPHRINE 5; 5 MG/ML; UG/ML
INJECTION, SOLUTION PERINEURAL PRN
Status: DISCONTINUED | OUTPATIENT
Start: 2019-04-23 | End: 2019-04-23 | Stop reason: HOSPADM

## 2019-04-23 RX ORDER — ONDANSETRON 2 MG/ML
INJECTION INTRAMUSCULAR; INTRAVENOUS PRN
Status: DISCONTINUED | OUTPATIENT
Start: 2019-04-23 | End: 2019-04-23

## 2019-04-23 RX ORDER — CEFAZOLIN SODIUM IN 0.9 % NACL 3 G/100 ML
3 INTRAVENOUS SOLUTION, PIGGYBACK (ML) INTRAVENOUS
Status: COMPLETED | OUTPATIENT
Start: 2019-04-23 | End: 2019-04-23

## 2019-04-23 RX ORDER — ACETAMINOPHEN 325 MG/1
975 TABLET ORAL ONCE
Status: COMPLETED | OUTPATIENT
Start: 2019-04-23 | End: 2019-04-23

## 2019-04-23 RX ORDER — ACETAMINOPHEN 325 MG/1
650 TABLET ORAL
Status: DISCONTINUED | OUTPATIENT
Start: 2019-04-23 | End: 2019-04-23 | Stop reason: HOSPADM

## 2019-04-23 RX ORDER — MEPERIDINE HYDROCHLORIDE 25 MG/ML
12.5 INJECTION INTRAMUSCULAR; INTRAVENOUS; SUBCUTANEOUS
Status: DISCONTINUED | OUTPATIENT
Start: 2019-04-23 | End: 2019-04-23 | Stop reason: HOSPADM

## 2019-04-23 RX ORDER — FENTANYL CITRATE 50 UG/ML
INJECTION, SOLUTION INTRAMUSCULAR; INTRAVENOUS PRN
Status: DISCONTINUED | OUTPATIENT
Start: 2019-04-23 | End: 2019-04-23

## 2019-04-23 RX ORDER — PHENAZOPYRIDINE HYDROCHLORIDE 200 MG/1
200 TABLET, FILM COATED ORAL ONCE
Status: COMPLETED | OUTPATIENT
Start: 2019-04-23 | End: 2019-04-23

## 2019-04-23 RX ORDER — OXYCODONE HYDROCHLORIDE 5 MG/1
5-10 TABLET ORAL EVERY 4 HOURS PRN
Qty: 20 TABLET | Refills: 0 | Status: SHIPPED | OUTPATIENT
Start: 2019-04-23 | End: 2020-04-02

## 2019-04-23 RX ORDER — GLYCOPYRROLATE 0.2 MG/ML
INJECTION, SOLUTION INTRAMUSCULAR; INTRAVENOUS PRN
Status: DISCONTINUED | OUTPATIENT
Start: 2019-04-23 | End: 2019-04-23

## 2019-04-23 RX ADMIN — HYDROMORPHONE HYDROCHLORIDE 0.5 MG: 1 INJECTION, SOLUTION INTRAMUSCULAR; INTRAVENOUS; SUBCUTANEOUS at 10:02

## 2019-04-23 RX ADMIN — HYDROMORPHONE HYDROCHLORIDE 0.5 MG: 1 INJECTION, SOLUTION INTRAMUSCULAR; INTRAVENOUS; SUBCUTANEOUS at 08:48

## 2019-04-23 RX ADMIN — PROPOFOL 200 MG: 10 INJECTION, EMULSION INTRAVENOUS at 07:59

## 2019-04-23 RX ADMIN — ROCURONIUM BROMIDE 50 MG: 10 INJECTION INTRAVENOUS at 07:59

## 2019-04-23 RX ADMIN — ROCURONIUM BROMIDE 20 MG: 10 INJECTION INTRAVENOUS at 09:26

## 2019-04-23 RX ADMIN — GLYCOPYRROLATE 0.8 MG: 0.2 INJECTION, SOLUTION INTRAMUSCULAR; INTRAVENOUS at 11:28

## 2019-04-23 RX ADMIN — LABETALOL 20 MG/4 ML (5 MG/ML) INTRAVENOUS SYRINGE 5 MG: at 10:16

## 2019-04-23 RX ADMIN — CEFAZOLIN 1 G: 1 INJECTION, POWDER, FOR SOLUTION INTRAMUSCULAR; INTRAVENOUS at 09:50

## 2019-04-23 RX ADMIN — SODIUM CHLORIDE, POTASSIUM CHLORIDE, SODIUM LACTATE AND CALCIUM CHLORIDE: 600; 310; 30; 20 INJECTION, SOLUTION INTRAVENOUS at 06:40

## 2019-04-23 RX ADMIN — PROPOFOL 50 MCG/KG/MIN: 10 INJECTION, EMULSION INTRAVENOUS at 07:57

## 2019-04-23 RX ADMIN — SODIUM CHLORIDE, POTASSIUM CHLORIDE, SODIUM LACTATE AND CALCIUM CHLORIDE: 600; 310; 30; 20 INJECTION, SOLUTION INTRAVENOUS at 07:48

## 2019-04-23 RX ADMIN — SODIUM CHLORIDE, POTASSIUM CHLORIDE, SODIUM LACTATE AND CALCIUM CHLORIDE: 600; 310; 30; 20 INJECTION, SOLUTION INTRAVENOUS at 08:55

## 2019-04-23 RX ADMIN — LABETALOL 20 MG/4 ML (5 MG/ML) INTRAVENOUS SYRINGE 5 MG: at 09:10

## 2019-04-23 RX ADMIN — DEXAMETHASONE SODIUM PHOSPHATE 4 MG: 4 INJECTION, SOLUTION INTRA-ARTICULAR; INTRALESIONAL; INTRAMUSCULAR; INTRAVENOUS; SOFT TISSUE at 07:59

## 2019-04-23 RX ADMIN — MIDAZOLAM 2 MG: 1 INJECTION INTRAMUSCULAR; INTRAVENOUS at 07:38

## 2019-04-23 RX ADMIN — OXYCODONE HYDROCHLORIDE 5 MG: 5 TABLET ORAL at 15:49

## 2019-04-23 RX ADMIN — ROCURONIUM BROMIDE 10 MG: 10 INJECTION INTRAVENOUS at 10:43

## 2019-04-23 RX ADMIN — LIDOCAINE HYDROCHLORIDE 50 MG: 10 INJECTION, SOLUTION INFILTRATION; PERINEURAL at 07:59

## 2019-04-23 RX ADMIN — ROCURONIUM BROMIDE 10 MG: 10 INJECTION INTRAVENOUS at 08:44

## 2019-04-23 RX ADMIN — LABETALOL 20 MG/4 ML (5 MG/ML) INTRAVENOUS SYRINGE 5 MG: at 10:52

## 2019-04-23 RX ADMIN — Medication 0.5 MG: at 13:09

## 2019-04-23 RX ADMIN — Medication 5 MG: at 11:28

## 2019-04-23 RX ADMIN — ROCURONIUM BROMIDE 10 MG: 10 INJECTION INTRAVENOUS at 09:59

## 2019-04-23 RX ADMIN — ONDANSETRON 4 MG: 2 INJECTION INTRAMUSCULAR; INTRAVENOUS at 11:26

## 2019-04-23 RX ADMIN — FENTANYL CITRATE 50 MCG: 50 INJECTION, SOLUTION INTRAMUSCULAR; INTRAVENOUS at 08:26

## 2019-04-23 RX ADMIN — ROCURONIUM BROMIDE 10 MG: 10 INJECTION INTRAVENOUS at 08:24

## 2019-04-23 RX ADMIN — FENTANYL CITRATE 100 MCG: 50 INJECTION, SOLUTION INTRAMUSCULAR; INTRAVENOUS at 08:20

## 2019-04-23 RX ADMIN — ACETAMINOPHEN 975 MG: 325 TABLET, FILM COATED ORAL at 07:21

## 2019-04-23 RX ADMIN — PHENAZOPYRIDINE 200 MG: 200 TABLET ORAL at 07:21

## 2019-04-23 RX ADMIN — KETOROLAC TROMETHAMINE 30 MG: 30 INJECTION, SOLUTION INTRAMUSCULAR at 15:39

## 2019-04-23 RX ADMIN — FENTANYL CITRATE 100 MCG: 50 INJECTION, SOLUTION INTRAMUSCULAR; INTRAVENOUS at 07:59

## 2019-04-23 RX ADMIN — Medication 3 G: at 07:50

## 2019-04-23 RX ADMIN — FENTANYL CITRATE 50 MCG: 50 INJECTION, SOLUTION INTRAMUSCULAR; INTRAVENOUS at 12:31

## 2019-04-23 RX ADMIN — GLYCOPYRROLATE 0.2 MG: 0.2 INJECTION, SOLUTION INTRAMUSCULAR; INTRAVENOUS at 07:59

## 2019-04-23 ASSESSMENT — MIFFLIN-ST. JEOR
SCORE: 1984.86
SCORE: 1984.86

## 2019-04-23 NOTE — OR NURSING
Pt very sleepy, with cramping, still requiring oxygen via nasal cannula.  Pt unsure if she will be able to discharge to home.  Dr. Hamilton paged regarding potential observation orders.

## 2019-04-23 NOTE — ANESTHESIA PREPROCEDURE EVALUATION
Anesthesia Pre-Procedure Evaluation    Patient: Tiffani Brasher   MRN: [id number] : 1970          Preoperative Diagnosis: Uterine fibroids, menorrhagia    Procedure(s):  single site total laparoscopic hysterectomy, with bilateral salpingectomy and colposuspension  and possible total abdominal hysterectomy    Past Medical History:   Diagnosis Date     Anemia      Past Surgical History:   Procedure Laterality Date     GYN SURGERY      myomectomy     ORTHOPEDIC SURGERY Right      Anesthesia Evaluation     . Pt has had prior anesthetic. Type: General           ROS/MED HX    ENT/Pulmonary:  - neg pulmonary ROS     Neurologic:  - neg neurologic ROS     Cardiovascular:  - neg cardiovascular ROS       METS/Exercise Tolerance:     Hematologic:     (+) Anemia, -      Musculoskeletal:  - neg musculoskeletal ROS       GI/Hepatic:  - neg GI/hepatic ROS       Renal/Genitourinary:  - ROS Renal section negative       Endo:     (+) Obesity, .      Psychiatric:  - neg psychiatric ROS       Infectious Disease:  - neg infectious disease ROS       Malignancy:      - no malignancy   Other:    (+) No chance of pregnancy C-spine cleared: N/A, no H/O Chronic Pain,no other significant disability   - neg other ROS                      Physical Exam  Normal systems: cardiovascular, pulmonary and dental    Airway   Mallampati: II  TM distance: >3 FB  Neck ROM: full    Dental     Cardiovascular       Pulmonary             Lab Results   Component Value Date    WBC 5.5 2019    HGB 10.9 (L) 2019    HCT 35.0 2019     2019     2019    POTASSIUM 4.1 2019    CHLORIDE 109 2019    CO2 24 2019    BUN 11 2019    CR 0.66 2019    GLC 87 2019    IDALIA 9.2 2019    ALBUMIN 3.6 2019    PROTTOTAL 7.6 2019    ALT 15 2019    AST 9 2019    ALKPHOS 64 2019    BILITOTAL 0.3 2019    TSH 1.00 2019       Preop Vitals  BP Readings from  "Last 3 Encounters:   04/23/19 133/70   04/18/19 133/88   04/11/19 116/81    Pulse Readings from Last 3 Encounters:   04/23/19 88   04/18/19 82   04/11/19 74      Resp Readings from Last 3 Encounters:   04/23/19 20   04/18/19 18    SpO2 Readings from Last 3 Encounters:   04/23/19 100%   04/11/19 100%   04/03/19 100%      Temp Readings from Last 1 Encounters:   04/23/19 98.2  F (36.8  C) (Temporal)    Ht Readings from Last 1 Encounters:   04/23/19 1.778 m (5' 10\")      Wt Readings from Last 1 Encounters:   04/23/19 127.5 kg (281 lb)    Estimated body mass index is 40.32 kg/m  as calculated from the following:    Height as of this encounter: 1.778 m (5' 10\").    Weight as of this encounter: 127.5 kg (281 lb).       Anesthesia Plan      History & Physical Review  History and physical reviewed and following examination; no interval change.    ASA Status:  2 .    NPO Status:  > 8 hours    Plan for General and ETT with Intravenous induction. Maintenance will be Balanced.    PONV prophylaxis:  Ondansetron (or other 5HT-3) and Dexamethasone or Solumedrol       Postoperative Care  Postoperative pain management:  IV analgesics.      Consents  Anesthetic plan, risks, benefits and alternatives discussed with:  Patient.  Use of blood products discussed: Yes.   Use of blood products discussed with Patient.  Consented to blood products.  .                 Oumar Quispe MD                    .  "

## 2019-04-23 NOTE — OR NURSING
Consulted with Dr. Hamilton. Pt has been very sleepy.  Dr. Hamilton would like us to observe pt for a few more hours and then reassess if pt can be discharged or admitted. Reviewed plan with pt and she is in agreement.

## 2019-04-23 NOTE — ANESTHESIA CARE TRANSFER NOTE
Patient: Tiffani Brasher    Procedure(s):  single site total laparoscopic hysterectomy, lysis of adhesion, drainage of ovarian cyst    Diagnosis: Uterine fibroids, menorrhagia  Diagnosis Additional Information: No value filed.    Anesthesia Type:   General, ETT     Note:  Airway :Face Mask  Patient transferred to:PACU  Comments: Patient oral suctioned. Patient with spontaneous respirations and adequate tidal volumes. Patient awake and responsive. Extubated in OR to 8 L face tent. To PACU ventilating well. VSS. Report given.Handoff Report: Identifed the Patient, Identified the Reponsible Provider, Reviewed the pertinent medical history, Discussed the surgical course, Reviewed Intra-OP anesthesia mangement and issues during anesthesia, Set expectations for post-procedure period and Allowed opportunity for questions and acknowledgement of understanding      Vitals: (Last set prior to Anesthesia Care Transfer)    CRNA VITALS  4/23/2019 1115 - 4/23/2019 1152      4/23/2019             Resp Rate (observed):  11                Electronically Signed By: HALLE Bolden CRNA  April 23, 2019  11:52 AM

## 2019-04-23 NOTE — DISCHARGE INSTRUCTIONS
DR. ÓSCAR ODONNELL M.D.   CLINIC PHONE NUMBER:  [phone].                                                                                                               GENERAL ANESTHESIA OR SEDATION ADULT DISCHARGE INSTRUCTIONS   SPECIAL PRECAUTIONS FOR 24 HOURS AFTER SURGERY    IT IS NOT UNUSUAL TO FEEL LIGHT-HEADED OR FAINT, UP TO 24 HOURS AFTER SURGERY OR WHILE TAKING PAIN MEDICATION.  IF YOU HAVE THESE SYMPTOMS; SIT FOR A FEW MINUTES BEFORE STANDING AND HAVE SOMEONE ASSIST YOU WHEN YOU GET UP TO WALK OR USE THE BATHROOM.    YOU SHOULD REST AND RELAX FOR THE NEXT 24 HOURS AND YOU MUST MAKE ARRANGEMENTS TO HAVE SOMEONE STAY WITH YOU FOR AT LEAST 24 HOURS AFTER YOUR DISCHARGE.  AVOID HAZARDOUS AND STRENUOUS ACTIVITIES.  DO NOT MAKE IMPORTANT DECISIONS FOR 24 HOURS.    DO NOT DRIVE ANY VEHICLE OR OPERATE MECHANICAL EQUIPMENT FOR 24 HOURS FOLLOWING THE END OF YOUR SURGERY.  EVEN THOUGH YOU MAY FEEL NORMAL, YOUR REACTIONS MAY BE AFFECTED BY THE MEDICATION YOU HAVE RECEIVED.    DO NOT DRINK ALCOHOLIC BEVERAGES FOR 24 HOURS FOLLOWING YOUR SURGERY.    DRINK CLEAR LIQUIDS (APPLE JUICE, GINGER ALE, 7-UP, BROTH, ETC.).  PROGRESS TO YOUR REGULAR DIET AS YOU FEEL ABLE.    YOU MAY HAVE A DRY MOUTH, A SORE THROAT, MUSCLES ACHES OR TROUBLE SLEEPING.  THESE SHOULD GO AWAY AFTER 24 HOURS.    CALL YOUR DOCTOR FOR ANY OF THE FOLLOWING:  SIGNS OF INFECTION (FEVER, GROWING TENDERNESS AT THE SURGERY SITE, A LARGE AMOUNT OF DRAINAGE OR BLEEDING, SEVERE PAIN, FOUL-SMELLING DRAINAGE, REDNESS OR SWELLING.    IT HAS BEEN OVER 8 TO 10 HOURS SINCE SURGERY AND YOU ARE STILL NOT ABLE TO URINATE (PASS WATER).     LAPAROSCOPIC HYSTERECTOMY DISCHARGE INSTRUCTIONS    PLEASE RETURN TO THE CLINIC IN:  ____2 WEEKS  ____4 WEEKS  ____6 WEEKS  MAKE THIS APPOINTMENT AFTER YOU GET HOME IF IT HAS NOT ALREADY BEEN SCHEDULED.     YOU HAVE HAD A HYSTERECTOMY (SURGERY TO REMOVE YOUR UTERUS).  YOU CANNOT HAVE CHILDREN AFTER THIS SURGERY.  YOU WILL NO LONGER  HAVE MONTHLY PERIODS, UNLESS YOU STILL HAVE YOUR CERVIX (CALLED SUBTOTAL HYSTERECTOMY).  IN THIS CASE, YOU MAY HAVE LIGHT MONTHLY BLEEDING.    DIET  YOU MAY FEEL LESS HUNGRY AT FIRST.  TRY TO EAT A WELL-BALANCED DIET WITH LOTS OF PROTEINS, FRUITS, VEGETABLES AND WHOLE GRAINS.  AVOID SPICY AND GREASY FOODS.    DRINK PLENTY OF FLUIDS--AT LEAST 8 TALL GLASSES A DAY.  WATER IS BEST.  TRY TO LIMIT CAFFEINE (FOUND IN COFFEE, TEA AND COLA DRINKS).  THIS HELPS PREVENT CONSTIPATION (HARD STOOLS THAT ARE DIFFICULT TO PASS).    IF CONSTIPATION IS A PROBLEM, YOU MAY TAKE ONE OF THESE MEDICINES:  DOCUSATE (COLACE), DOCUSATE WITH CASANTHRANOL (NATALIA-COLACE), PSYLLIUM (METAMUCIL) OR MILK OF MAGNESIA.  YOU CAN BUY THESE AT THE DRUG STORE.  FOLLOW THE INSTRUCTIONS LISTED ON THE LABEL.    ACTIVITY  YOU WILL NEED PLENTY OF REST AT FIRST.  SLOWLY GO BACK TO YOUR NORMAL ACTIVITIES.  IT WILL HELP TO TAKE SEVERAL SHORT WALKS DURING THE DAY.  IT IS OKAY TO CLIMB STAIRS, BUT USE THE HANDRAIL IN CASE YOU GET DIZZY.    DO NOT DRIVE WHILE USING STRONG PAIN MEDICINE (NARCOTICS).  AFTER THAT, DO NOT DRIVE UNTIL YOU CAN STOMP ON THE BRAKES WITHOUT PAIN.    DO NOT USE TAMPONS, DOUCHE OR HAVE SEX (INTERCOURSE) UNTIL YOU SEE YOUR DOCTOR AGAIN.    DON'T LIFT OR PUSH ANYTHING OVER 20 POUNDS UNTIL YOUR DOCTOR SAYS IT'S SAFE.  AVOID HEAVY OR STRENUOUS EXERCISE.    YOUR DOCTOR WILL TELL YOU WHEN YOU CAN SAFELY RETURN TO WORK.    PAIN CONTROL  YOU MAY HAVE PAIN AROUND YOUR INCISIONS (SURGERY WOUNDS).  THIS SHOULD IMPROVE OVER THE NEXT WEEK.  USE YOUR PAIN MEDICINE AS DIRECTED.  IF YOU HAVE INCREASED PAIN, PLEASE CALL YOUR CLINIC.  IT IS NORMAL TO FEEL A LITTLE SORE AFTER MILD EXERCISE.      FOR THE NEXT TWO DAYS, YOU MAY HAVE SOME PAIN IN YOUR SHOULDERS AND UPPER CHEST.  THIS IS FROM THE AIR PUMPED INTO YOUR BODY DURING THE SURGERY.  TO RELIEVE THIS TYPE OF PAIN, YOU MAY TAKE TYLENOL (ACETAMINOPHEN) OR ADVIL (IBUPROFEN).  FOLLOW THE INSTRUCTIONS LISTED ON  THE LABEL.  DRINK A FULL GLASS OF WATER WITH EACH DOSE.  YOU CAN ALSO TRY LYING FLAT OR RAISING YOUR HIPS ABOVE SHOULDER LEVEL.    BATHING  YOU MAY SHOWER OR BATHE AT ANY TIME.  IF YOU TAKE A BATH, DON'T ADD ANYTHING TO THE BATH WATER.    CARING FOR YOUR STITCHES  YOUR BODY WILL ABSORB YOUR STITCHES OVER TIME.  KEEP THEM CLEAN AND DRY.  WEAR LOOSE, COMFORTABLE CLOTHES.    NORMAL SYMPTOMS  YOU MAY NOTICE A SMALL AMOUNT OF BLEEDING FROM YOUR VAGINA.  THIS COULD LAST UP TO A WEEK.  YOU MAY ALSO HAVE BROWN FLUID LEAKING FROM THE VAGINA.  THIS COULD LAST FOR A FEW WEEKS.  WEAR PADS AS NEEDED.    YOU MAY HAVE BRUISING ON YOUR BELLY.  YOU MIGHT ALSO HAVE A PUFFY FEELING IN YOUR BELLY.    YOU MAY SEE A LITTLE BLOOD OR FLUID OOZING FROM THE INCISION.    HORMONES  IF WE REMOVED YOUR OVARIES, YOU MAY NEED HORMONE MEDICINE (HT, OR HORMONE THERAPY).  DISCUSS THIS WITH YOUR DOCTOR.  IF WE DID NOT REMOVE YOUR OVARIES, YOU SHOULD REACH MENOPAUSE AT THE NORMAL TIME (IN GENERAL, BETWEEN AGES 40 AND 55).    CALL YOUR DOCTOR IF YOU HAVE:  SEVERE CHILLS AND FEVER .4 DEGREES FAHRENHEIT OR HIGHER, TAKEN UNDER THE TONGUE.   BRIGHT RED BLOOD OR LARGE CLOTS COMING OUT OF THE VAGINA--ENOUGH TO SOAK ONE PAD IN AN HOUR.  INCREASED PAIN, WARMTH, SWELLING, REDNESS, BLEEDING OR FLUID LEAKING FROM THE SURGERY SITE.  URINE OR VAGINAL FLUID THAT SMELLS BAD.  YOU CANNOT URINATE (USE THE TOILET), IT BURNS WHEN YOU URINATE OR YOU NEED TO GO MORE OFTEN.  SEVERE NAUSEA (FEELING SICK TO YOUR STOMACH) OR VOMITING (THROWING UP).  INCREASED PAIN THAT YOU CANNOT CONTROL WITH MEDICINE.        Maximum acetaminophen (Tylenol) dose from all sources should not exceed 4 grams (4000 mg) per day.  You received 975 mg at 7:20 am    You received Toradol, an IV form of ibuprofen (Motrin) at 3:30 pm.  Do not take any ibuprofen products until 9:30 pm.    You were given one oxycodone  5 mg tablet at 3:45 pm.

## 2019-04-23 NOTE — PROGRESS NOTES
Patient in discharge hallway conversational with family, alert oriented x4, slightly lethargic but opens eyes spont., comfortable, making needs known, RA in the mid 90's, phase 2 Sandy score currently 18, wants to take a half hour nap then get dressed and in recliner

## 2019-04-23 NOTE — BRIEF OP NOTE
[facility]  Gynecology Brief Operative Note    Pre-operative diagnosis: Uterine fibroids, menorrhagia   Post-operative diagnosis Same  Extensive adhesions  Ovarian cyst   Procedure: Procedure(s):  Single site total hysterectomy, lysis of adhesions, drainage of ovarian cyst   Surgeon: Vicki Hamilton MD   Assistants(s): Edgar Perkins MD   Anesthesia: General    Estimated blood loss: 800 cc    Specimens: Uterus, cervix   Findings: Extensive adhesions, Large ovarian cyst drained, extensive fibroids and enlarged uterus   Complications: See op report   Comments: See dictated operative report for full details       Vicki Hamilton

## 2019-04-23 NOTE — ANESTHESIA POSTPROCEDURE EVALUATION
Patient: Tiffani Brasher    Procedure(s):  single site total laparoscopic hysterectomy, lysis of adhesion, drainage of ovarian cyst    Diagnosis:Uterine fibroids, menorrhagia  Diagnosis Additional Information: Diagnosis: Uterine fibroids, menorrhagia    Anesthesia Type:  General, ETT    Note:  Anesthesia Post Evaluation    Patient location during evaluation: PACU  Patient participation: Able to fully participate in evaluation  Level of consciousness: awake and alert  Pain management: adequate  Airway patency: patent  Cardiovascular status: acceptable  Respiratory status: acceptable  Hydration status: acceptable  PONV: controlled     Anesthetic complications: None          Last vitals:  Vitals:    04/23/19 1330 04/23/19 1345 04/23/19 1400   BP: 144/89 (!) 139/93 143/89   Pulse: 85 85 84   Resp: 10 12 14   Temp:   97.1  F (36.2  C)   SpO2: 100% 100% 100%         Electronically Signed By: Oumar Quispe MD  April 23, 2019  2:23 PM

## 2019-04-24 LAB — COPATH REPORT: NORMAL

## 2019-04-24 ASSESSMENT — ENCOUNTER SYMPTOMS
SORE THROAT: 0
NAUSEA: 0
NUMBNESS: 0
CHILLS: 0
DIARRHEA: 0
FEVER: 0
COUGH: 0
CONSTIPATION: 0
DIFFICULTY URINATING: 0
BLOOD IN STOOL: 0
TROUBLE SWALLOWING: 0
NECK PAIN: 1
HEADACHES: 1
ABDOMINAL PAIN: 0
EYE PAIN: 0
VOMITING: 0
SHORTNESS OF BREATH: 0
HALLUCINATIONS: 0
SLEEP DISTURBANCE: 0
LIGHT-HEADEDNESS: 0
UNEXPECTED WEIGHT CHANGE: 0
MYALGIAS: 0
ARTHRALGIAS: 0
SEIZURES: 0
PALPITATIONS: 0
NERVOUS/ANXIOUS: 0
DIZZINESS: 0
BACK PAIN: 1
FACIAL ASYMMETRY: 0
WEAKNESS: 0
FATIGUE: 0

## 2019-04-24 NOTE — OP NOTE
Procedure Date: 04/23/2019      PREOPERATIVE DIAGNOSES:   1.  Prior ex lap with myomectomy.   2.  Enlarged uterus.   3.  Multiple fibroids.   4.  Menorrhagia.   5.  Morselization of uterus in an enclosed bag.      POSTOPERATIVE DIAGNOSES:   1.  Prior ex lap with myomectomy.   2.  Enlarged uterus.   3.  Multiple fibroids.   4.  Menorrhagia.   5.  Extensive abdominal adhesions.   6.  Ovarian cyst drainage.      PROCEDURES:   1.  Single site total laparoscopic hysterectomy.   2.  Drainage of ovarian cyst.   3.  Lysis of adhesions.      SURGEON:  Vicki Hamilton MD      ASSISTANT:  Edgar Perkins.      ANESTHESIA:  General endotracheal anesthesia.      ESTIMATED BLOOD LOSS:  800 mL.      SPECIMENS:  Uterus, cervix.      FINDINGS:  Extensive adhesions requiring almost 2 hours of adhesiolysis throughout the procedure.  A large ovarian cyst that was drained, extensive fibroids, and an enlarged uterus.      COMPLICATIONS:  Include uterine enlargement, extensive adhesions, immobility of the uterus, and a narrow vaginal introitus.      INDICATIONS:  The patient is a 48-year-old nulligravid female who has longstanding history of uterine fibroids for which she has undergone an exploratory laparotomy with myomectomy in the past at which time she had a wound VAC placed for difficult healing.  She has subsequently gone on to develop recurrent menorrhagia, has a very low AMH of less than 1, and has recurrent multiple fibroids.  For this reason, we did discuss options for management, and she did opt for definitive management.  This was a difficult decision for her and had met with Psychiatry on multiple occasions to discuss this definitive decision and was at peace with her decision.  We discussed the risk of the procedure given her operative experience and discussed possibility of converting to open due to adhesions and an increased risk of bowel and/or bladder injury as well as ureteral injury due to the size of the uterus and her  likely adhesions.  She did sign an informed consent including all these risks.  Risks, benefits, and alternatives were again reviewed the morning of the procedure and she did sign an informed consent.      DESCRIPTION OF PROCEDURE:  The patient was brought to the operating room where general endotracheal anesthesia was placed.  She was prepped and draped in the normal sterile fashion.  A pause for the cause was performed and patient and procedure were correctly identified.  At this point, a Parks catheter was inserted and a medium VCare cup was deployed and snugged into the vaginal cervical junction.  Attention was placed to the abdomen where an infraumbilical incision was made vertically at the site of the prior midline.  This was brought down to the fascial layer, which was grasped with 2-0 Vicryl sutures and entered sharply.  The TriPort Plus was then deployed into the abdomen.  There were filmy adhesions surrounding the uterus; anterior, and on both sides walls as well as omentum was adhesed circumferentially around the uterus.  This was taken down cautiously, dissecting with blunt traction as well as Thunderbeat electrocautery.  We were able to identify the round ligament on the patient's left side.  This was then taken down and retroperitoneal dissection brought us down to the uterine arteries on the patient's left side.  Attention was then placed to the right side where further adhesiolysis was performed and mobilization of the uterus.  We did dissect the bladder away inferiorly and uterine arteries were able to be cauterized and cut bilaterally.  There was drainage of old endometrium in the right ovary during this adhesiolysis and we were able to maintain enough normal anatomy which enabled us to see the delineated V cup and a colpotomy was created.  At this point, the size of the uterus was too large to deliver vaginally and therefore, apical sutures were placed on the prior vaginal cuff and this was closed  with 4 interrupted sutures of 0 Vicryl.  There was good maintenance of support and due to her significant adhesions posteriorly, we did not feel that we were able to create a colposuspension stitch.  However, there was minimal movement of the vaginal vault and we felt that this would be okay.  At this point, there was still omentum adhesed to the posterior side of the uterus.  This was taken down methodically.  The uterus was then placed into a PneumoLiner bag and using the Cari morcellator, the uterus was morcellated in its entirety.  The bag was removed, and the PneumoLiner port was then removed.  A TriPort Plus was then deployed, and all underlying tissue layers were made hemostatic.  The abdomen was irrigated, and Elizabeth was applied to the operative sites.  Instrumentation was removed and the fascial layer was closed with a running suture of 0 Vicryl without palpable defect and the prior incision site was reapproximated with 0 Monocryl and dressed with Steri-Strips and infiltrated with 10 mL of local lidocaine.  All counts were correct, and she will be transferred to PACU in stable condition.         ÓSCAR ODONNELL MD             D: 2019   T: 2019   MT: JUVE      Name:     REJI DESIR   MRN:      [id number]        Account:        [id number]   :      1970           Procedure Date: 2019      Document: [id number]

## 2019-05-20 ENCOUNTER — PATIENT OUTREACH (OUTPATIENT)
Dept: CARE COORDINATION | Facility: CLINIC | Age: 49
End: 2019-05-20

## 2019-05-20 DIAGNOSIS — T81.89XA: Primary | ICD-10-CM

## 2019-05-20 DIAGNOSIS — F32.89: Primary | ICD-10-CM

## 2019-12-13 DIAGNOSIS — F43.10 POSTTRAUMATIC STRESS DISORDER: Primary | ICD-10-CM

## 2019-12-13 PROCEDURE — 80048 BASIC METABOLIC PNL TOTAL CA: CPT | Performed by: PSYCHIATRY & NEUROLOGY

## 2019-12-13 PROCEDURE — 84443 ASSAY THYROID STIM HORMONE: CPT | Performed by: PSYCHIATRY & NEUROLOGY

## 2019-12-13 PROCEDURE — 36415 COLL VENOUS BLD VENIPUNCTURE: CPT | Performed by: PSYCHIATRY & NEUROLOGY

## 2019-12-14 LAB
ANION GAP SERPL CALCULATED.3IONS-SCNC: 7 MMOL/L (ref 3–14)
BUN SERPL-MCNC: 11 MG/DL (ref 7–30)
CALCIUM SERPL-MCNC: 9 MG/DL (ref 8.5–10.1)
CHLORIDE SERPL-SCNC: 106 MMOL/L (ref 94–109)
CO2 SERPL-SCNC: 26 MMOL/L (ref 20–32)
CREAT SERPL-MCNC: 0.68 MG/DL (ref 0.52–1.04)
GFR SERPL CREATININE-BSD FRML MDRD: >90 ML/MIN/{1.73_M2}
GLUCOSE SERPL-MCNC: 86 MG/DL (ref 70–99)
POTASSIUM SERPL-SCNC: 3.8 MMOL/L (ref 3.4–5.3)
SODIUM SERPL-SCNC: 139 MMOL/L (ref 133–144)
TSH SERPL DL<=0.005 MIU/L-ACNC: 0.77 MU/L (ref 0.4–4)

## 2020-01-29 ENCOUNTER — DOCUMENTATION ONLY (OUTPATIENT)
Dept: SLEEP MEDICINE | Facility: CLINIC | Age: 50
End: 2020-01-29
Payer: COMMERCIAL

## 2020-01-29 NOTE — PROGRESS NOTES
Pt was referred by her psychiatrist for insomnia.  Pt was screened for MAIN and RLS.  Pt does snore and jerks awake during the night.  She takes rozerem to help her sleep.  She was advised to continue taking all meds as prescribed and if she decides to taper off them, she will need to work with her prescribing physician. Pt will be scheduled with a sleep medicine provider to be evaluated for MAIN and if she continues to struggle with insomnia, she would be interested in pursuing CBT-I.  Pt scheduled with Dr. Bloom on 2/7/20.  Sleep questionnaire and appointment info mailed to patient.

## 2020-02-24 ENCOUNTER — MYC MEDICAL ADVICE (OUTPATIENT)
Dept: FAMILY MEDICINE | Facility: CLINIC | Age: 50
End: 2020-02-24

## 2020-03-11 ENCOUNTER — HEALTH MAINTENANCE LETTER (OUTPATIENT)
Age: 50
End: 2020-03-11

## 2020-04-02 ENCOUNTER — MYC MEDICAL ADVICE (OUTPATIENT)
Dept: SLEEP MEDICINE | Facility: CLINIC | Age: 50
End: 2020-04-02

## 2020-04-02 VITALS — HEIGHT: 70 IN | WEIGHT: 293 LBS | BODY MASS INDEX: 41.95 KG/M2

## 2020-04-02 PROBLEM — F33.2 SEVERE EPISODE OF RECURRENT MAJOR DEPRESSIVE DISORDER, WITHOUT PSYCHOTIC FEATURES (H): Status: ACTIVE | Noted: 2018-10-08

## 2020-04-02 PROBLEM — F41.1 GAD (GENERALIZED ANXIETY DISORDER): Status: ACTIVE | Noted: 2018-10-08

## 2020-04-02 PROBLEM — F43.10 PTSD (POST-TRAUMATIC STRESS DISORDER): Status: ACTIVE | Noted: 2019-03-01

## 2020-04-02 RX ORDER — IBUPROFEN 600 MG/1
600 TABLET, FILM COATED ORAL
COMMUNITY
Start: 2019-10-19 | End: 2020-12-02

## 2020-04-02 RX ORDER — LORAZEPAM 0.5 MG/1
TABLET ORAL
COMMUNITY
Start: 2019-12-13 | End: 2020-12-02

## 2020-04-02 RX ORDER — RAMELTEON 8 MG/1
TABLET, FILM COATED ORAL
COMMUNITY
Start: 2020-03-21 | End: 2020-11-20

## 2020-04-02 RX ORDER — SERTRALINE HYDROCHLORIDE 100 MG/1
TABLET, FILM COATED ORAL
COMMUNITY
Start: 2020-03-19 | End: 2020-11-11

## 2020-04-02 RX ORDER — HYDROXYZINE PAMOATE 25 MG/1
CAPSULE ORAL
Refills: 1 | COMMUNITY
Start: 2019-11-06 | End: 2020-12-02

## 2020-04-02 ASSESSMENT — MIFFLIN-ST. JEOR: SCORE: 2038.83

## 2020-04-02 NOTE — PATIENT INSTRUCTIONS
"MY TREATMENT INFORMATION FOR SLEEP APNEA-  Tiffani Brasher    DOCTOR : Bennett Ezra Goltz, PA-C  SLEEP CENTER : [facility]     MY CONTACT NUMBER: [phone]    Am I having a sleep study at a sleep center?  Make sure you have an appointment for the study before you leave!    Am I having a home sleep study?  Watch this video:  Https://[email or web address].com/[email or web address]?v=yGGFBdELGhk  Please verify your insurance coverage with your insurance carrier    Frequently asked questions:  1. What is Obstructive Sleep Apnea (MAIN)? MAIN is the most common type of sleep apnea. Apnea means, \"without breath.\"  Apnea is most often caused by narrowing or collapse of the upper airway as muscles relax during sleep.   Almost everyone has occasional apneas. Most people with sleep apnea have had brief interruptions at night frequently for many years.  The severity of sleep apnea is related to how frequent and severe the events are.   2. What are the consequences of MAIN? Symptoms include: feeling sleepy during the day, snoring loudly, gasping or stopping of breathing, trouble sleeping, and occasionally morning headaches or heartburn at night.  Sleepiness can be serious and even increase the risk of falling asleep while driving. Other health consequences may include development of high blood pressure and other cardiovascular disease in persons who are susceptible. Untreated MAIN  can contribute to heart disease, stroke and diabetes.   3. What are the treatment options? In most situations, sleep apnea is a lifelong disease that must be managed with daily therapy. Medications are not effective for sleep apnea and surgery is generally not considered until other therapies have been tried. Your treatment is your choice . Continuous Positive Airway (CPAP) works right away and is the therapy that is effective in nearly everyone. An oral device to hold your jaw forward is usually the next most reliable option. Other options include postioning devices (to keep you " off your back), weight loss, and surgery including a tongue pacing device. There is more detail about some of these options below.    Important tips for using CPAP and similar devices   Know your equipment:  CPAP is continuous positive airway pressure that prevents obstructive sleep apnea by keeping the throat from collapsing while you are sleeping. In most cases, the device is  smart  and can slowly self-adjusts if your throat collapses and keeps a record every day of how well you are treated-this information is available to you and your care team.  BPAP is bilevel positive airway pressure that keeps your throat open and also assists each breath with a pressure boost to maintain adequate breathing.  Special kinds of BPAP are used in patients who have inadequate breathing from lung or heart disease. In most cases, the device is  smart  and can slowly self-adjusts to assist breathing. Like CPAP, the device keeps a record of how well you are treated.  Your mask is your connection to the device. You get to choose what feels most comfortable and the staff will help to make sure if fits. Here: are some examples of the different masks that are available:       Key points to remember on your journey with sleep apnea:  1. Sleep study.  PAP devices often need to be adjusted during a sleep study to show that they are effective and adjusted right.  2. Good tips to remember: Try wearing just the mask during a quiet time during the day so your body adapts to wearing it. A humidifier is recommended for comfort in most cases to prevent drying of your nose and throat. Allergy medication from your provider may help you if you are having nasal congestion.  3. Getting settled-in. It takes more than one night for most of us to get used to wearing a mask. Try wearing just the mask during a quiet time during the day so your body adapts to wearing it. A humidifier is recommended for comfort in most cases. Our team will work with you  carefully on the first day and will be in contact within 4 days and again at 2 and 4 weeks for advice and remote device adjustments. Your therapy is evaluated by the device each day.   4. Use it every night. The more you are able to sleep naturally for 7-8 hours, the more likely you will have good sleep and to prevent health risks or symptoms from sleep apnea. Even if you use it 4 hours it helps. Occasionally all of us are unable to use a medical therapy, in sleep apnea, it is not dangerous to miss one night.   5. Communicate. Call our skilled team on the number provided on the first day if your visit for problems that make it difficult to wear the device. Over 2 out of 3 patients can learn to wear the device long-term with help from our team. Remember to call our team or your sleep providers if you are unable to wear the device as we may have other solutions for those who cannot adapt to mask CPAP therapy. It is recommended that you sleep your sleep provider within the first 3 months and yearly after that if you are not having problems.   6. Use it for your health. We encourage use of CPAP masks during daytime quiet periods to allow your face and brain to adapt to the sensation of CPAP so that it will be a more natural sensation to awaken to at night or during naps. This can be very useful during the first few weeks or months of adapting to CPAP though it does not help medically to wear CPAP during wakefulness and  should not be used as a strategy just to meet guidelines.  7. Take care of your equipment. Make sure you clean your mask and tubing using directions every day and that your filter and mask are replaced as recommended or if they are not working.     BESIDES CPAP, WHAT OTHER THERAPIES ARE THERE?    Positioning Device  Positioning devices are generally used when sleep apnea is mild and only occurs on your back.This example shows a pillow that straps around the waist. It may be appropriate for those whose  sleep study shows milder sleep apnea that occurs primarily when lying flat on one's back. Preliminary studies have shown benefit but effectiveness at home may need to be verified by a home sleep test. These devices are generally not covered by medical insurance.  Examples of devices that maintain sleeping on the back to prevent snoring and mild sleep apnea.    Belt type body positioner  Http://[employer]Solfo/    Electronic reminder  Http://nightshifttherapy.com/  Http://www.[employer].Solfo.au/      Oral Appliance  What is oral appliance therapy?  An oral appliance device fits on your teeth at night like a retainer used after having braces. The device is made by a specialized dentist and requires several visits over 1-2 months before a manufactured device is made to fit your teeth and is adjusted to prevent your sleep apnea. Once an oral device is working properly, snoring should be improved. A home sleep test may be recommended at that time if to determine whether the sleep apnea is adequately treated.       Some things to remember:  -Oral devices are often, but not always, covered by your medical insurance. Be sure to check with your insurance provider.   -If you are referred for oral therapy, you will be given a list of specialized dentists to consider or you may choose to visit the Web site of the American Academy of Dental Sleep Medicine  -Oral devices are less likely to work if you have severe sleep apnea or are extremely overweight.     More detailed information  An oral appliance is a small acrylic device that fits over the upper and lower teeth  (similar to a retainer or a mouth guard). This device slightly moves jaw forward, which moves the base of the tongue forward, opens the airway, improves breathing for effective treat snoring and obstructive sleep apnea in perhaps 7 out of 10 people .  The best working devices are custom-made by a dental device  after a mold is made of the teeth 1, 2, 3.  When  is an oral appliance indicated?  Oral appliance therapy is recommended as a first-line treatment for patients with primary snoring, mild sleep apnea, and for patients with moderate sleep apnea who prefer appliance therapy to use of CPAP4, 5. Severity of sleep apnea is determined by sleep testing and is based on the number of respiratory events per hour of sleep.   How successful is oral appliance therapy?  The success rate of oral appliance therapy in patients with mild sleep apnea is 75-80% while in patients with moderate sleep apnea it is 50-70%. The chance of success in patients with severe sleep apnea is 40-50%. The research also shows that oral appliances have a beneficial effect on the cardiovascular health of MAIN patients at the same magnitude as CPAP therapy7.  Oral appliances should be a second-line treatment in cases of severe sleep apnea, but if not completely successful then a combination therapy utilizing CPAP plus oral appliance therapy may be effective. Oral appliances tend to be effective in a broad range of patients although studies show that the patients who have the highest success are females, younger patients, those with milder disease, and less severe obesity. 3, 6.   Finding a dentist that practices dental sleep medicine  Specific training is available through the American Academy of Dental Sleep Medicine for dentists interested in working in the field of sleep. To find a dentist who is educated in the field of sleep and the use of oral appliances, near you, visit the Web site of the American Academy of Dental Sleep Medicine.    References  1. Jose Eduardo et al. Objectively measured vs self-reported compliance during oral appliance therapy for sleep-disordered breathing. Chest 2013; 144(5): 3396-2055.  2. Santhosh et al. Objective measurement of compliance during oral appliance therapy for sleep-disordered breathing. Thorax 2013; 68(1): 91-96.  3. Genet et al. Mandibular advancement  devices in 620 men and women with MAIN and snoring: tolerability and predictors of treatment success. Chest 2004; 125: 0216-9209.  4. Shonda et al. Oral appliances for snoring and MAIN: a review. Sleep 2006; 29: 244-262.  5. Suzanne et al. Oral appliance treatment for MAIN: an update. J Clin Sleep Med 2014; 10(2): 215-227.  6. Kelli et al. Predictors of OSAH treatment outcome. J Dent Res 2007; 86: 0217-7882.    Weight Loss:    Weight loss is a long-term strategy that may improve sleep apnea in some patients.    Weight management is a personal decision and the decision should be based on your interest and the potential benefits.  If you are interested in exploring weight loss strategies, the following discussion covers the impact on weight loss on sleep apnea and the approaches that may be successful.    Being overweight does not necessarily mean you will have health consequences.  Those who have BMI over 35 or over 27 with existing medical conditions carries greater risk.   Weight loss decreases severity of sleep apnea in most people with obesity. For those with mild obesity who have developed snoring with weight gain, even 15-30 pound weight loss can improve and occasionally eliminate sleep apnea.  Structured and life-long dietary and health habits are necessary to lose weight and keep healthier weight levels.     Though there may be significant health benefits from weight loss, long-term weight loss is very difficult to achieve- studies show success with dietary management in less than 10% of people. In addition, substantial weight loss may require years of dietary control and may be difficult if patients have severe obesity. In these cases, surgical management may be considered.  Finally, older individuals who have tolerated obesity without health complications may be less likely to benefit from weight loss strategies.      Your BMI is Body mass index is 42.18 kg/m .  Weight management is a personal  decision.  If you are interested in exploring weight loss strategies, the following discussion covers the approaches that may be successful. Body mass index (BMI) is one way to tell whether you are at a healthy weight, overweight, or obese. It measures your weight in relation to your height.  A BMI of 18.5 to 24.9 is in the healthy range. A person with a BMI of 25 to 29.9 is considered overweight, and someone with a BMI of 30 or greater is considered obese. More than two-thirds of American adults are considered overweight or obese.  Being overweight or obese increases the risk for further weight gain. Excess weight may lead to heart disease and diabetes.  Creating and following plans for healthy eating and physical activity may help you improve your health.  Weight control is part of healthy lifestyle and includes exercise, emotional health, and healthy eating habits. Careful eating habits lifelong are the mainstay of weight control. Though there are significant health benefits from weight loss, long-term weight loss with diet alone may be very difficult to achieve- studies show long-term success with dietary management in less than 10% of people. Attaining a healthy weight may be especially difficult to achieve in those with severe obesity. In some cases, medications, devices and surgical management might be considered.  What can you do?  If you are overweight or obese and are interested in methods for weight loss, you should discuss this with your provider.     Consider reducing daily calorie intake by 500 calories.     Keep a food journal.     Avoiding skipping meals, consider cutting portions instead.    Diet combined with exercise helps maintain muscle while optimizing fat loss. Strength training is particularly important for building and maintaining muscle mass. Exercise helps reduce stress, increase energy, and improves fitness. Increasing exercise without diet control, however, may not burn enough calories  to loose weight.       Start walking three days a week 10-20 minutes at a time    Work towards walking thirty minutes five days a week     Eventually, increase the speed of your walking for 1-2 minutes at time    In addition, we recommend that you review healthy lifestyles and methods for weight loss available through the National Institutes of Health patient information sites:  http://win.niddk.nih.gov/publications/index.htm    And look into health and wellness programs that may be available through your health insurance provider, employer, local community center, or marcia club.    Weight management plan: Patient was referred to their PCP to discuss a diet and exercise plan.  Surgery:  Surgery for obstructive sleep apnea is considered generally only when other therapies fail to work. Surgery may be discussed with you if you are having a difficult time tolerating CPAP and or when there is an abnormal structure that requires surgical correction.  Nose and throat surgeries often enlarge the airway to prevent collapse.  Most of these surgeries create pain for 1-2 weeks and up to half of the most common surgeries are not effective throughout life.  You should carefully discuss the benefits and drawbacks to surgery with your sleep provider and surgeon to determine if it is the best solution for you.   More information  Surgery for MAIN is directed at areas that are responsible for narrowing or complete obstruction of the airway during sleep.  There are a wide range of procedures available to enlarge and/or stabilize the airway to prevent blockage of breathing in the three major areas where it can occur: the palate, tongue, and nasal regions.  Successful surgical treatment depends on the accurate identification of the factors responsible for obstructive sleep apnea in each person.  A personalized approach is required because there is no single treatment that works well for everyone.  Because of anatomic variation,  consultation with an examination by a sleep surgeon is a critical first step in determining what surgical options are best for each patient.  In some cases, examination during sedation may be recommended in order to guide the selection of procedures.  Patients will be counseled about risks and benefits as well as the typical recovery course after surgery. Surgery is typically not a cure for a person s MAIN.  However, surgery will often significantly improve one s MAIN severity (termed  success rate ).  Even in the absence of a cure, surgery will decrease the cardiovascular risk associated with OSA7; improve overall quality of life8 (sleepiness, functionality, sleep quality, etc).  Palate Procedures:  Patients with MAIN often have narrowing of their airway in the region of their tonsils and uvula.  The goals of palate procedures are to widen the airway in this region as well as to help the tissues resist collapse.  Modern palate procedure techniques focus on tissue conservation and soft tissue rearrangement, rather than tissue removal.  Often the uvula is preserved in this procedure. Residual sleep apnea is common in patient after pharyngoplasty with an average reduction in sleep apnea events of 33%2.    Tongue Procedures:  ExamWhile patients are awake, the muscles that surround the throat are active and keep this region open for breathing. These muscles relax during sleep, allowing the tongue and other structures to collapse and block breathing.  There are several different tongue procedures available.  Selection of a tongue base procedure depends on characteristics seen on physical exam.  Generally, procedures are aimed at removing bulky tissues in this area or preventing the back of the tongue from falling back during sleep.  Success rates for tongue surgery range from 50-62%3.  Hypoglossal Nerve Stimulation:  Hypoglossal nerve stimulation has recently received approval from the [place] Food and Drug  Administration for the treatment of obstructive sleep apnea.  This is based on research showing that the system was safe and effective in treating sleep apnea6.  Results showed that the median AHI score decreased 68%, from 29.3 to 9.0. This therapy uses an implant system that senses breathing patterns and delivers mild stimulation to airway muscles, which keeps the airway open during sleep.  The system consists of three fully implanted components: a small generator (similar in size to a pacemaker), a breathing sensor, and a stimulation lead.  Using a small handheld remote, a patient turns the therapy on before bed and off upon awakening.    Candidates for this device must be greater than 22 years of age, have moderate to severe MAIN (AHI between 20-65), BMI less than 32, have tried CPAP/oral appliance without success, and have appropriate upper airway anatomy (determined by a sleep endoscopy performed by Dr. Cisneros).  Hypoglossal Nerve Stimulation Pathway:    The sleep surgeon s office will work with the patient through the insurance prior-authorization process (including communications and appeals).    Nasal Procedures:  Nasal obstruction can interfere with nasal breathing during the day and night.  Studies have shown that relief of nasal obstruction can improve the ability of some patients to tolerate positive airway pressure therapy for obstructive sleep apnea1.  Treatment options include medications such as nasal saline, topical corticosteroid and antihistamine sprays, and oral medications such as antihistamines or decongestants. Non-surgical treatments can include external nasal dilators for selected patients. If these are not successful by themselves, surgery can improve the nasal airway either alone or in combination with these other options.  Combination Procedures:  Combination of surgical procedures and other treatments may be recommended, particularly if patients have more than one area of narrowing or  persistent positional disease.  The success rate of combination surgery ranges from 66-80%2,3.    References  1. Paul JACK. The Role of the Nose in Snoring and Obstructive Sleep Apnoea: An Update.  Eur Arch Otorhinolaryngol. 2011; 268: 1365-73.  2.  Micha SM; Gian JA; Antoinette JR; Pallanch JF; Marquise MB; Diego SG; Chandu LYNCH. Surgical modifications of the upper airway for obstructive sleep apnea in adults: a systematic review and meta-analysis. SLEEP 2010;33(10):4993-7533. Elena RICHTER. Hypopharyngeal surgery in obstructive sleep apnea: an evidence-based medicine review.  Arch Otolaryngol Head Neck Surg. 2006 Feb;132(2):206-13.  3. Guzman YH1, Timoteo Y, Abhishek ELIAS. The efficacy of anatomically based multilevel surgery for obstructive sleep apnea. Otolaryngol Head Neck Surg. 2003 Oct;129(4):327-35.  4. Elena RICHTER, Goldberg A. Hypopharyngeal Surgery in Obstructive Sleep Apnea: An Evidence-Based Medicine Review. Arch Otolaryngol Head Neck Surg. 2006 Feb;132(2):206-13.  5. Ni PJ et al. Upper-Airway Stimulation for Obstructive Sleep Apnea.  N Engl J Med. 2014 Jan 9;370(2):139-49.  6. Keena Y et al. Increased Incidence of Cardiovascular Disease in Middle-aged Men with Obstructive Sleep Apnea. Am J Respir Crit Care Med; 2002 166: 159-165  7. Sussy EM et al. Studying Life Effects and Effectiveness of Palatopharyngoplasty (SLEEP) study: Subjective Outcomes of Isolated Uvulopalatopharyngoplasty. Otolaryngol Head Neck Surg. 2011; 144: 623-631.  A common problem for people with insomnia is spending too much time in bed  trying  to sleep.  You really should only be in bed for no more than 7-8 hours per night.  Spending too much time in bed can lead to being awake and having an  overactive  mind.  One effective way to address this problem is reducing how much time you spend in bed each night.  Be careful with driving or other dangerous activities when trying these strategeis however.  We recommend that you follow these steps  to improve your insomnia:    Set a new sleep schedule where you reduce the time you spend in bed by 1-2 hours, e.g. Go to bed at 2 AM and get up at 9:30 M    Keep this sleep schedule every day of the week including the weekends for two weeks.    After two weeks you can add 15-30 minutes more time in bed if you have been sleeping more soundly.    If you still aren t sleeping well, reduce the time you spend in bed by another 15-30 but not less than 5 hours per night    General recommendations for sleep problems (Insomnia)  Allow 2-4 weeks to see results     Establish a regular sleep schedule    Most people only need 7-8 hours of sleep.  Don't be in bed longer than you need     to sleep or you will end up spending more time awake in bed. This trains your    brain to think of the bed as a place to not sleep.  Go to bed at same time each night   Get up at same time each day - Set an alarm everyday (even weekends). This is one of    the most important tips. It prevents you from relying on your insomnia to get you    up on time for your day. That actually reinforces insomnia. It also will help your    body get into a pattern where you start feeling tired at a consistent time each    night.  The body functions best when you keep a consistent routine.  Avoid sleeping-in and napping. Anytime you sleep during the day, you will be less tired at    night. You may be tired enough to fall asleep, but you will wake more in the    middle of the night because you will have met your sleep need before the night is    done.   Cut down time in bed (if not asleep, get up)- Use your bed only for sleep and sex    Anytime you spend time in bed doing activities other than sleep (reading,    watching TV, working, playing on the computer or phone, or even just laying in    bed trying to sleep), you are training  your brain to think of the bed as a place to    do activities other than sleep. If you are not falling asleep within 20-30 minutes,     get out of bed. While out of bed, avoid bright lights. Avoid work or chores. Being    productive in the middle of the night reinforces waking up at night. Find relaxing,    not particularly entertaining activities like reading, listening to music, or relaxation    exercises. Go back to bed if you start feeling groggy, or after about 30 minutes,    even if not feeling very tired. Sometimes, just getting out of bed stops the pattern    of getting frustrated about laying in bed not sleeping, and that can help you fall    asleep.   Avoid trying to force yourself to sleep- sleep is not like everything else. The harder you    work at most things, the more you can accomplish. The harder you work at    sleep, the less you will sleep.     Make the bedroom comfortable - quiet, dark and cool are better. Consider ear plugs    (silicon). Use dark blinds or wear an eye mask if needed     Make a relaxing routine prior to bedtime  Relaxation exercises:   Progressive muscle relaxation: Relax each muscle group individually    Begin with your feet, flex, then relax. Try to imagine your feet feeling heavy and sinking into the bed. Move to your calves, do the same thing. Work through each muscle group toward your head.    Relaxing Mental Imagery: Try to imagine a trip that you took and found relaxing, or imagine a day at the beach. Try to walk yourself through the day in your mind as if you were dreaming it. Try to imagine sensing the different experiences, such as feeling sand between your toes, the heat of the sun on your skin, seeing the waves crashing the shore, the smell of the salt water, etc.     Deal with your worries before bedtime    Set aside a worry time around dinner time for 10-15 minutes. Write down the    things that are on your mind. Plan time in the coming days to address those    issues. Brainstorm ideas on how you will deal with them. Try to identify issues    that are out of your control, and try to let those  issues go.  Listen to relaxation tapes   Classical Music or Nature sounds   Back Massage   Get regular exercise each day (at least 1-2 hours before bedtime)   Take medications only as directed   Eat a light bedtime snack or warm drink   Warm milk   Warm herbal tea (non-caffeinated)       Things to avoid   No overstimulating activities just before bed   No competitive games before bedtime   No exciting television programs before bedtime   Avoid caffeine after lunchtime   Avoid chocolate   Do not use alcohol to induce sleep (worsens Insomnia)   Do not take someone else's sleeping pills   Do not look at the clock when awakening   Do not turn on light when getting up to use bathroom, use a nightlight     Online Programs     www.Tripwire (pronounced shut eye). There is a fee for this program. Enter the code  [place]  if you decide to enroll in this program.      www.sleepIO.com (pronounced sleep ee oh). There is a fee for this program. Enter the code  [place]  if you decide to enroll in this program.     Suggested Resources  Insomnia Treatment Books     Overcoming Insomnia by Ralph Serrato and Yelena Mcclellan (2008)    No More Sleepless Nights by Olu Miller and Margareth Vera (1996)    Say Lexy to Insomnia by Diego Yin (2009)    The Insomnia Workbook by Nazia Bolaños and Aditya Zapata (2009)    The Insomnia Answer by Yosvany Altman and Everette Davis (2006)      Stress Management and Relaxation Books    The Relaxation and Stress Reduction Workbook by Mary Joiner, Nadine Campoverde and Sony Son (2008)    Stress Management Workbook: Techniques and Self-Assessment Procedures by Cierra Harris and Kenn Jacob (1997)    A Mindfulness-Based Stress Reduction Workbook by Wilbert Kirkpatrick and Jie Bates (2010)    The Complete Stress Management Workbook by Ephraim Can, Mitchell Catherine and Lenard Clifford (1996)    Assert Yourself by Danyell Dyer and Wolf Dyer  (1977)    Relaxation Resources for Computer Download   These websites offer resources to help you relax. This list is for information only. [facility] is not responsible for the quality of services or the actions of any person or organization.  Progressive Muscle Relaxation (PMR):     http://www.redIT/progressive-muscle-relaxation-exercise.html     http://studentsupport.[facility]/counseling/resources/self-help/relaxation-and-stress-management/   Deep Breathing Exercises:    http://www.redIT/breathing-awareness.html     Meditation:     wwwHelloFax    www.MobissimoguidedDirectAdoptions.commeditation-site.com You may have to pay for some of these resources.    Guided Imagery:    http://www.redIT/guided-imagery-scripts.html     http://Wizer/library/nsfwigkvkx-pxivzf-zvkpwkq/     Counseling / Behavioral Health  [facility]  Visit www.[email or web address].org or call [phone] to find a clinic close to you.  Or call [phone] for [facility].

## 2020-04-02 NOTE — PROGRESS NOTES
"Covid - 19// questions answered.  All questions no.    Tiffani Brasher is a 49 year old female who is being evaluated via a billable telephone visit.      The patient has been notified of following:     \"This telephone visit will be conducted via a call between you and your physician/provider. We have found that certain health care needs can be provided without the need for a physical exam.  This service lets us provide the care you need with a short phone conversation.  If a prescription is necessary we can send it directly to your pharmacy.  If lab work is needed we can place an order for that and you can then stop by our lab to have the test done at a later time.    If during the course of the call the physician/provider feels a telephone visit is not appropriate, you will not be charged for this service.\"     Patient has given verbal consent for Telephone visit?  Yes    Tiffani Brasher complains of    Chief Complaint   Patient presents with     Consult       I have reviewed and updated the patient's Past Medical History, Social History, Family History and Medication List.    ALLERGIES  Patient has no known allergies.        Sleep Consultation:    Date on this visit: 4/3/2020    Tiffani Brasher is sent by No ref. provider found for a sleep consultation regarding insomnia.    Primary Physician: Gonzalo Thompson     Tiffani Brasher reports difficulty falling asleep and staying asleep, much worse in the last year. Her medical history is significant for morbid obesity, depression/anxiety.     Her sleep issues became much worse since January 2019. She found out she was required to have a hysterectomy at that time. She has been in therapy since then. She has PTSD related to that. She has nightmares 1-2 times per month. She denies flashbacks. She has been doing EMDR recently, which she says has \"stirred up a lot of stuff.\"  At the end of 2018, she lost her apartment and was homeless briefly. Prior to " 2019, she would go to bed with the iPad around 1 AM and have to get up 5 AM for a 30 minutes work obligation. She was working as a  then. She would go back to sleep  until 9 AM.     She also has questions about feeling herself jerk awake as she is just dozing. She often is dreaming she is falling. This is consistent with hypnic jerks and she was informed this is normal.    In the last 1-2 months, she has been noticing her heart skip a beat in the morning. It can last for 10-15 minutes if she is laying in bed relaxed. The rate sometimes races at these times. She has a smart watch that says her pulse is about 97 bpm at that time. She has talked to her primary and it was felt this did not require urgent evaluation.    Tiffani goes to bed at 12:30-1:00 AM during the week. She sometimes can't fall asleep until 3-4 AM (rarely before 2-2:30 AM). She wakes up at 9:30-10:00 AM without an alarm. If she has an early appointment, she sets several alarms. Her best sleep is between 4:00-6:00 AM. After 6 AM her sleep is pretty solid. Tiffani has difficulty falling asleep and staying asleep. She has been on ramelteon 8 mg, but does not think it helps much. Tried for about 3 months, not working very well. Hydroxyzine helped at  mg. She was very groggy the next day. She wakes up 2-3 times a night for hours before falling back to sleep.  Tiffani wakes up to uncertain reasons. When she wakes, her brain is going like a hamster on a wheel. Sometimes she wakes drenched in sweat. She frequently oscillates between being too hot and too cold. She tries to meditate or look out a window. She stays in bed. Sometimes she looks at her phone. She sometimes gets up for the restroom. On weekends, her sleep schedule is the same.  Patient gets an average of 4-5 hours of sleep per night.     Patient does use electronics in bed, worry in bed and read in bed and does not watch TV in bed.     Tiffani is out of work currently. 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 She lives with a roommate.    Tiffani does not know much about her snoring. Her roommate does not notice her snoring. She has not been told that she snores. She does wake herself with a snort when napping. Patient does not have a regular bed partner. There is not report of gasping, snorting or witnessed apneas. Patient sleeps on her side and stomach. She has occasional morning headaches (once per week), denies morning dry mouth, morning confusion and restless legs. Tiffani has occasional jaw clenching and denies any sleep walking, dream enactment, cataplexy and hypnogogic/hypnopompic hallucinations. She has a history of sleep talking when she last had a bed partner a few years ago. She has hypnic jerks. She has had sleep paralysis, more when on trazodone.    Tiffani has depression and anxiety, denies difficulty breathing through her nose, reflux at night and heartburn.  She has some claustrophobia, can't tolerate a bike helmet.    Tiffani has gained 5-10 pounds in the last couple of years.  Patient describes themself as a night person.  She would prefer to go to sleep at 1:30-2:00 AM and wake up at 9:00 AM.  Patient's [place] Sleepiness score 10/24 consistent with mild daytime sleepiness.      Tiffani naps 0-1 times per week (only since quarantine) for 90 minutes, feels refreshed after naps. She takes frequent inadvertant naps (in meetings, reading).  She denies dozing while driving. She has dozed off at a stop light over a year ago.  Patient was counseled on the importance of driving while alert, to pull over if drowsy, or nap before getting into the vehicle if sleepy.  She uses coffee 5-6 times per month. She started coffee when combating the hydroxyzine hangover. She is very sensitive to it. Last caffeine intake is usually in the morning.    Allergies:    No Known Allergies    Medications:    Current Outpatient Medications   Medication Sig Dispense Refill     hydrOXYzine  (VISTARIL) 25 MG capsule TK 1 TO 3 CS PO HS UTD  1     ibuprofen (ADVIL/MOTRIN) 600 MG tablet Take 600 mg by mouth       LORazepam (ATIVAN) 0.5 MG tablet        ROZEREM 8 MG tablet TK 1 T PO HS. PLEASE SCHEDULE A FOLLOW UP APPOINTMENT FOR FURTHER REFILLS       sertraline (ZOLOFT) 100 MG tablet TK 1 AND 1/2 TS PO D         Problem List:  Patient Active Problem List    Diagnosis Date Noted     Morbid obesity (H) 04/11/2019     Priority: Medium     Anemia, iron deficiency 04/09/2019     Priority: Medium        Past Medical/Surgical History:  Past Medical History:   Diagnosis Date     Anemia      Past Surgical History:   Procedure Laterality Date     GYN SURGERY  2007    myomectomy     LAPAROSCOPIC HYSTERECTOMY TOTAL N/A 4/23/2019    Procedure: single site total laparoscopic hysterectomy, lysis of adhesion, drainage of ovarian cyst;  Surgeon: Vicki Hamilton MD;  Location: [facility] OR     ORTHOPEDIC SURGERY Right        Social History:  Social History     Socioeconomic History     Marital status: Single     Spouse name: Not on file     Number of children: Not on file     Years of education: Not on file     Highest education level: Not on file   Occupational History     Not on file   Social Needs     Financial resource strain: Not on file     Food insecurity     Worry: Not on file     Inability: Not on file     Transportation needs     Medical: Not on file     Non-medical: Not on file   Tobacco Use     Smoking status: Never Smoker     Smokeless tobacco: Never Used   Substance and Sexual Activity     Alcohol use: Yes     Comment: was 2-3 times per week, 2-3 drinks     Drug use: Not Currently     Types: Marijuana     Sexual activity: Not Currently   Lifestyle     Physical activity     Days per week: Not on file     Minutes per session: Not on file     Stress: Not on file   Relationships     Social connections     Talks on phone: Not on file     Gets together: Not on file     Attends Restorationist service: Not on file     Active  member of club or organization: Not on file     Attends meetings of clubs or organizations: Not on file     Relationship status: Not on file     Intimate partner violence     Fear of current or ex partner: Not on file     Emotionally abused: Not on file     Physically abused: Not on file     Forced sexual activity: Not on file   Other Topics Concern     Parent/sibling w/ CABG, MI or angioplasty before 65F 55M? Not Asked   Social History Narrative     Not on file       Family History:  Family History   Problem Relation Age of Onset     Hypertension Mother      Sleep Apnea Sister        Review of Systems:  A complete review of systems reviewed by me is negative with the exeption of what has been mentioned in the history of present illness.  CONSTITUTIONAL: NEGATIVE for weight loss, fever, chills, sweats, drug allergies.  CONSTITUTIONAL:  POSITIVE for  weight gain and night sweats  EYES: NEGATIVE for changes in vision, blind spots, double vision.  ENT: NEGATIVE for ear pain, sore throat, sinus pain, post-nasal drip, runny nose, bloody nose  CARDIAC: NEGATIVE for chest pain or pressure, breathlessness when lying flat, swollen legs or swollen feet.  CARDIAC:  POSITIVE for  fast heart beats and fluttering in chest  NEUROLOGIC: NEGATIVE weakness or numbness in the arms or legs.  NEUROLOGIC:  POSITIVE for  headaches  DERMATOLOGIC: NEGATIVE for rashes, new moles or change in mole(s)  PULMONARY: NEGATIVE SOB at rest, SOB with activity, dry cough, productive cough, coughing up blood, wheezing or whistling when breathing.    GASTROINTESTINAL: NEGATIVE for vomitting, loose or watery stools, fat or grease in stools, constipation, abdominal pain, bowel movements black in color or blood noted.  GASTROINTESTINAL:  POSITIVE for  nausea  GENITOURINARY: NEGATIVE for pain during urination, blood in urine, urinating more frequently than usual, irregular menstrual periods.  MUSCULOSKELETAL: NEGATIVE for muscle pain, bone or joint pain,  "swollen joints.  ENDOCRINE: NEGATIVE for increased thirst or urination, diabetes.  LYMPHATIC: NEGATIVE for swollen lymph nodes, lumps or bumps in the breasts or nipple discharge.  MENTAL HEALTH: POSITIVE for depression and anxiety    Physical Examination:  Vitals: Ht 1.778 m (5' 10\")   Wt 133.4 kg (294 lb)   BMI 42.18 kg/m           [place] Total Score 4/3/2020   Total score - [place] 10       KATIE Total Score: 22 (04/03/20 0700)      Impression/Plan:    (R40.0) Sleepiness  (primary encounter diagnosis), (R06.83) Snoring, (E66.01) Morbid obesity (H)  Comment:  She is not observed while sleeping but does sometimes wake herself with a snort when she naps. On her questionnaire, she marked that her snoring can be heard outside of the room, but in conversation today, she said she is not told that she snores. She is sleepy in the daytime, ESS 10/24. She struggles to stay awake for meetings and reading. She has not driven in a year, but did doze at a stoplight before that. Her BMI is 42. Her sister has MAIN. We do not have a neck circumference on file. Her STOP BANG is at least 3: snoring, sleepiness, BMI >35 (42). Negative risk factors include; HTN, age 49, gender female. CO2 has been normal, recently 26, suggesting low risk for hypoventilation. She does wake with headaches though.   Plan: Split night PSG with CPAP/BiPAP titration if indicated. I sent a prescription of 1 tab of zolpidem 5 mg to her pharmacy for the study.    (F51.04) Chronic insomnia  Comment: Insomnia became a significant issue in 1/2019 after she was required to have a hysterectomy due to health issues. She developed some PTSD from that. She also has a history of depression and anxiety. DSPS but has freedom to sleep when she needs. She has few boundaries on her sleep schedule, so her sleep opportunity is excessive, 12:30 AM to 10 AM. She has been napping lately, but has not napped traditionally. She inadvertently dozes frequently in quite situations. "   Plan: We reviewed concepts of circadian and homeostatic sleep drives. We also discussed sleep compression. She was advised to start with a sleep schedule of 2 AM to 9:30 AM (wake to an alarm). She was advised to avoid sleeping in or napping. She was given resources for guided imagery/relaxation. We will work on stimulus control after her sleep study and consider a referral to Dr. Garrison for CBT-I.        Literature provided regarding sleep apnea.      She will follow up with me in approximately two weeks after her sleep study has been competed to review the results and discuss plan of care.       Polysomnography reviewed.  Obstructive sleep apnea reviewed.  Complications of untreated sleep apnea were reviewed.  56 minutes (9:04 AM to 10 AM) was spent during this visit, over 50% in counseling and coordination of care.   Bennett Goltz, PA-C     CC: Gonzalo Thompson MD

## 2020-04-03 ENCOUNTER — OFFICE VISIT (OUTPATIENT)
Dept: SLEEP MEDICINE | Facility: CLINIC | Age: 50
End: 2020-04-03
Payer: COMMERCIAL

## 2020-04-03 DIAGNOSIS — R40.0 SLEEPINESS: Primary | ICD-10-CM

## 2020-04-03 DIAGNOSIS — F51.04 CHRONIC INSOMNIA: ICD-10-CM

## 2020-04-03 DIAGNOSIS — R06.83 SNORING: ICD-10-CM

## 2020-04-03 DIAGNOSIS — E66.01 MORBID OBESITY (H): ICD-10-CM

## 2020-04-03 PROCEDURE — 99204 OFFICE O/P NEW MOD 45 MIN: CPT | Performed by: PHYSICIAN ASSISTANT

## 2020-04-03 RX ORDER — ZOLPIDEM TARTRATE 5 MG/1
TABLET ORAL
Qty: 1 TABLET | Refills: 0 | Status: SHIPPED | OUTPATIENT
Start: 2020-04-03 | End: 2021-03-31

## 2020-05-11 ENCOUNTER — E-VISIT (OUTPATIENT)
Dept: FAMILY MEDICINE | Facility: CLINIC | Age: 50
End: 2020-05-11
Payer: COMMERCIAL

## 2020-05-11 DIAGNOSIS — Z53.9 ERRONEOUS ENCOUNTER--DISREGARD: Primary | ICD-10-CM

## 2020-05-12 ENCOUNTER — TELEPHONE (OUTPATIENT)
Dept: FAMILY MEDICINE | Facility: CLINIC | Age: 50
End: 2020-05-12

## 2020-05-12 NOTE — TELEPHONE ENCOUNTER
Patient sent an E-Visit for high BP and headache. It is more appropriate to have virtual visit instead.  Please schedule.

## 2020-05-14 ENCOUNTER — TRANSFERRED RECORDS (OUTPATIENT)
Dept: HEALTH INFORMATION MANAGEMENT | Facility: CLINIC | Age: 50
End: 2020-05-14

## 2020-05-15 ENCOUNTER — VIRTUAL VISIT (OUTPATIENT)
Dept: FAMILY MEDICINE | Facility: CLINIC | Age: 50
End: 2020-05-15
Payer: COMMERCIAL

## 2020-05-15 VITALS — DIASTOLIC BLOOD PRESSURE: 109 MMHG | SYSTOLIC BLOOD PRESSURE: 153 MMHG

## 2020-05-15 DIAGNOSIS — R03.0 ELEVATED BLOOD PRESSURE READING WITHOUT DIAGNOSIS OF HYPERTENSION: Primary | ICD-10-CM

## 2020-05-15 PROCEDURE — 99213 OFFICE O/P EST LOW 20 MIN: CPT | Mod: 95 | Performed by: INTERNAL MEDICINE

## 2020-05-15 NOTE — PROGRESS NOTES
"Tiffani Brasher is a 49 year old female who is being evaluated via a billable video visit.      The patient has been notified of following:     \"This video visit will be conducted via a call between you and your physician/provider. We have found that certain health care needs can be provided without the need for an in-person physical exam.  This service lets us provide the care you need with a video conversation.  If a prescription is necessary we can send it directly to your pharmacy.  If lab work is needed we can place an order for that and you can then stop by our lab to have the test done at a later time.    Video visits are billed at different rates depending on your insurance coverage.  Please reach out to your insurance provider with any questions.    If during the course of the call the physician/provider feels a video visit is not appropriate, you will not be charged for this service.\"    Patient has given verbal consent for Video visit? Yes    How would you like to obtain your AVS? Joel    Patient would like the video invitation sent by: Text to cell phone: [phone]     Will anyone else be joining your video visit? No      Video-Visit Details    Video Start Time: 3:49pm  Video End Time: 4:03pm    Originating Location (pt. Location): home    [facility] [place]     Platform used for Video Visit: [employer] [employer] (Am.Well)    HPI     Patient is concerned about her blood pressure being elevated in the 150s systolic and 100-110 diastolic.  No associated symptoms.  Does not monitor her sodium intake.  She does have a history of anxiety disorder and her anxiety has been more prevalent lately due to the current pandemic.  No history of hypertension.      Review of Systems   Constitutional: Negative for fatigue.   Eyes: Negative for visual disturbance.   Respiratory: Negative for shortness of breath.    Cardiovascular: Negative for chest pain, palpitations " and leg swelling.   Gastrointestinal: Negative for abdominal pain, nausea and vomiting.   Neurological: Negative for dizziness, weakness, light-headedness, numbness and headaches.         ICD-10-CM    1. Elevated blood pressure reading without diagnosis of hypertension  R03.0  patient advised to start monitoring her sodium intake and restrict it to 2500 mg/day; continue to monitor blood pressure and inform me if it continues to be above 140 systolic and 90s diastolic       Dr. Gonzalo Thompson

## 2020-05-21 ASSESSMENT — ENCOUNTER SYMPTOMS
LIGHT-HEADEDNESS: 0
DIZZINESS: 0
FATIGUE: 0
HEADACHES: 0
VOMITING: 0
SHORTNESS OF BREATH: 0
ABDOMINAL PAIN: 0
NAUSEA: 0
WEAKNESS: 0
NUMBNESS: 0
PALPITATIONS: 0

## 2020-06-25 ENCOUNTER — VIRTUAL VISIT (OUTPATIENT)
Dept: FAMILY MEDICINE | Facility: CLINIC | Age: 50
End: 2020-06-25
Payer: COMMERCIAL

## 2020-06-25 DIAGNOSIS — I10 ESSENTIAL HYPERTENSION: Primary | ICD-10-CM

## 2020-06-25 PROCEDURE — 99214 OFFICE O/P EST MOD 30 MIN: CPT | Mod: 95 | Performed by: INTERNAL MEDICINE

## 2020-06-25 RX ORDER — PROPRANOLOL HYDROCHLORIDE 80 MG/1
80 CAPSULE, EXTENDED RELEASE ORAL DAILY
Qty: 30 CAPSULE | Refills: 1 | Status: SHIPPED | OUTPATIENT
Start: 2020-06-25 | End: 2020-08-26

## 2020-06-25 ASSESSMENT — PATIENT HEALTH QUESTIONNAIRE - PHQ9: SUM OF ALL RESPONSES TO PHQ QUESTIONS 1-9: 7

## 2020-06-25 NOTE — PROGRESS NOTES
"Tiffani Brasher is a 49 year old female who is being evaluated via a billable video visit.      The patient has been notified of following:     \"This video visit will be conducted via a call between you and your physician/provider. We have found that certain health care needs can be provided without the need for an in-person physical exam.  This service lets us provide the care you need with a video conversation.  If a prescription is necessary we can send it directly to your pharmacy.  If lab work is needed we can place an order for that and you can then stop by our lab to have the test done at a later time.    Video visits are billed at different rates depending on your insurance coverage.  Please reach out to your insurance provider with any questions.    If during the course of the call the physician/provider feels a video visit is not appropriate, you will not be charged for this service.\"    Patient has given verbal consent for Video visit? Yes    Will anyone else be joining your video visit? No      Subjective     Tiffani Brasher is a 49 year old female who presents today via video visit for the following health issues:    HPI    Chief Complaint   Patient presents with     Headache     elvated /98          Review of Systems        ICD-10-CM    1. Essential hypertension  I10 propranolol ER (INDERAL LA) 80 MG 24 hr capsule         Dr. Gonzalo Thompson    "

## 2020-07-09 ENCOUNTER — TELEPHONE (OUTPATIENT)
Dept: SLEEP MEDICINE | Facility: CLINIC | Age: 50
End: 2020-07-09

## 2020-07-09 NOTE — TELEPHONE ENCOUNTER
Patient was called to schedule in lab sleep study at [facility] [place].     Voicemail left requesting call back.

## 2020-08-24 DIAGNOSIS — I10 ESSENTIAL HYPERTENSION: ICD-10-CM

## 2020-08-24 NOTE — TELEPHONE ENCOUNTER
LOV 6-25-20 Donna, no follow up on file    Future: Tomorrow - Tuesday 8-25-20 video visit with Dr Thompson    Of note - pharmacy requesting is in [place]    RT Yanira (R)

## 2020-08-25 RX ORDER — PROPRANOLOL HYDROCHLORIDE 80 MG/1
80 CAPSULE, EXTENDED RELEASE ORAL DAILY
Qty: 30 CAPSULE | Refills: 1 | OUTPATIENT
Start: 2020-08-25

## 2020-08-26 ENCOUNTER — VIRTUAL VISIT (OUTPATIENT)
Dept: FAMILY MEDICINE | Facility: CLINIC | Age: 50
End: 2020-08-26
Payer: COMMERCIAL

## 2020-08-26 DIAGNOSIS — E66.01 MORBID OBESITY (H): Primary | ICD-10-CM

## 2020-08-26 DIAGNOSIS — I10 ESSENTIAL HYPERTENSION: ICD-10-CM

## 2020-08-26 PROCEDURE — 99207 ZZC NON-BILLABLE SERV PER CHARTING: CPT | Performed by: INTERNAL MEDICINE

## 2020-08-26 RX ORDER — PROPRANOLOL HYDROCHLORIDE 80 MG/1
80 CAPSULE, EXTENDED RELEASE ORAL DAILY
Qty: 90 CAPSULE | Refills: 1 | Status: SHIPPED | OUTPATIENT
Start: 2020-08-26 | End: 2020-11-11

## 2020-08-26 NOTE — PROGRESS NOTES
"Tiffani Brasher is a 49 year old female who is being evaluated via a billable video visit.      The patient has been notified of following:     \"This video visit will be conducted via a call between you and your physician/provider. We have found that certain health care needs can be provided without the need for an in-person physical exam.  This service lets us provide the care you need with a video conversation.  If a prescription is necessary we can send it directly to your pharmacy.  If lab work is needed we can place an order for that and you can then stop by our lab to have the test done at a later time.    Video visits are billed at different rates depending on your insurance coverage.  Please reach out to your insurance provider with any questions.    If during the course of the call the physician/provider feels a video visit is not appropriate, you will not be charged for this service.\"    Patient has given verbal consent for Video visit? Yes  How would you like to obtain your AVS? MyChart  If you are dropped from the video visit, the video invite should be resent to: Text to cell phone: [phone]  Will anyone else be joining your video visit? No    Subjective     Tiffani Brasher is a 49 year old female who presents today via video visit for the following health issues:    HPI    Medication Followup of propranolol ER 80 mg 24 hr caps    Taking Medication as prescribed: yes    Side Effects:  Weight gain     Medication Helping Symptoms:  Yes     114/74 yesterday   127/88 today      Patient would like to discuss supplements            Video Start Time:         Review of Systems   10 point ROS of systems including Constitutional, Eyes, Respiratory, Cardiovascular, Gastroenterology, Genitourinary, Integumentary, Muscularskeletal, Psychiatric were all negative except for pertinent positives noted in my HPI.        Objective           Vitals:  No vitals were obtained today due to virtual visit.    Physical " Exam     GENERAL: Healthy, alert and no distress  EYES: Eyes grossly normal to inspection.  No discharge or erythema, or obvious scleral/conjunctival abnormalities.  RESP: No audible wheeze, cough, or visible cyanosis.  No visible retractions or increased work of breathing.    SKIN: Visible skin clear. No significant rash, abnormal pigmentation or lesions.  NEURO: Cranial nerves grossly intact.  Mentation and speech appropriate for age.  PSYCH: Mentation appears normal, affect normal/bright, judgement and insight intact, normal speech and appearance well-groomed.      Orders Only on 12/13/2019   Component Date Value Ref Range Status     Sodium 12/13/2019 139  133 - 144 mmol/L Final     Potassium 12/13/2019 3.8  3.4 - 5.3 mmol/L Final     Chloride 12/13/2019 106  94 - 109 mmol/L Final     Carbon Dioxide 12/13/2019 26  20 - 32 mmol/L Final     Anion Gap 12/13/2019 7  3 - 14 mmol/L Final     Glucose 12/13/2019 86  70 - 99 mg/dL Final     Urea Nitrogen 12/13/2019 11  7 - 30 mg/dL Final     Creatinine 12/13/2019 0.68  0.52 - 1.04 mg/dL Final     GFR Estimate 12/13/2019 >90  >60 mL/min/[1.73_m2] Final    Comment: Non  GFR Calc  Starting 12/18/2018, serum creatinine based estimated GFR (eGFR) will be   calculated using the Chronic Kidney Disease Epidemiology Collaboration   (CKD-EPI) equation.       GFR Estimate If Black 12/13/2019 >90  >60 mL/min/[1.73_m2] Final    Comment:  GFR Calc  Starting 12/18/2018, serum creatinine based estimated GFR (eGFR) will be   calculated using the Chronic Kidney Disease Epidemiology Collaboration   (CKD-EPI) equation.       Calcium 12/13/2019 9.0  8.5 - 10.1 mg/dL Final     TSH 12/13/2019 0.77  0.40 - 4.00 mU/L Final             Assessment & Plan     Tiffani was seen today for recheck medication.    Diagnoses and all orders for this visit:    Morbid obesity (H)  -     COMPREHENSIVE WEIGHT MANAGEMENT    Essential hypertension  -     propranolol ER (INDERAL [place])  "80 MG 24 hr capsule; Take 1 capsule (80 mg) by mouth daily         BMI:   Estimated body mass index is 42.18 kg/m  as calculated from the following:    Height as of 4/2/20: 1.778 m (5' 10\").    Weight as of 4/2/20: 133.4 kg (294 lb).           There are no Patient Instructions on file for this visit.      No follow-ups on file.    Gonzalo Thompson MD  [facility]      Video-Visit Details    Type of service:  Video Visit    Video End Time:    Originating Location (pt. Location):     Distant Location (provider location):  [facility]     Platform used for Video Visit:           "

## 2020-09-11 RX ORDER — PROPRANOLOL HYDROCHLORIDE 80 MG/1
80 CAPSULE, EXTENDED RELEASE ORAL DAILY
Qty: 30 CAPSULE | Refills: 1 | Status: SHIPPED | OUTPATIENT
Start: 2020-09-11 | End: 2020-11-20

## 2020-09-11 NOTE — TELEPHONE ENCOUNTER
Pt states that the pharma never received. Is requesting it to be sent to a different pharma now:    [id number] - [place], [place] - [address] [facility] [address]    Please call once sent  Pt ph: [phone]

## 2020-11-09 ENCOUNTER — TELEPHONE (OUTPATIENT)
Dept: FAMILY MEDICINE | Facility: CLINIC | Age: 50
End: 2020-11-09

## 2020-11-09 NOTE — TELEPHONE ENCOUNTER
Reason for Call:  Other call back    Detailed comments: Patient sent a GoNogging message on 10/28:    My Psychiatrist and I have terminated our relationship and she suggested that I reach out to my Primary Care physician to discuss continuation of my meds. Sertraline and Rozerem    I called patient and informed her that Dr. Thompson is no longer here and offered to schedule her with Dr. Phillips on 11/11 for an in clinic visit. Patient requested a video visit-I am checking with Dr. Rios- Patient asked where Dr. Thompson is working now.  I told her we do not have that information. Patient asked if his nurses are still her. She asked for a nurse to  call her     Phone Number Patient can be reached at: Home number on file [phone] (home)    Best Time:     Can we leave a detailed message on this number? YES    Call taken on 11/9/2020 at 11:39 AM by Bridgett Cruz

## 2020-11-09 NOTE — TELEPHONE ENCOUNTER
Pt was concerned about coming in as was told need labs. May need labs, but is scheduled for a video visit for now. Can discuss further w/Dr. Jacqueline Arreola RN

## 2020-11-20 ENCOUNTER — TELEPHONE (OUTPATIENT)
Dept: FAMILY MEDICINE | Facility: CLINIC | Age: 50
End: 2020-11-20

## 2020-11-20 ENCOUNTER — VIRTUAL VISIT (OUTPATIENT)
Dept: FAMILY MEDICINE | Facility: CLINIC | Age: 50
End: 2020-11-20
Payer: COMMERCIAL

## 2020-11-20 DIAGNOSIS — G47.00 INSOMNIA, UNSPECIFIED TYPE: ICD-10-CM

## 2020-11-20 DIAGNOSIS — Z13.220 LIPID SCREENING: ICD-10-CM

## 2020-11-20 DIAGNOSIS — F43.10 PTSD (POST-TRAUMATIC STRESS DISORDER): ICD-10-CM

## 2020-11-20 DIAGNOSIS — D50.9 IRON DEFICIENCY ANEMIA, UNSPECIFIED IRON DEFICIENCY ANEMIA TYPE: ICD-10-CM

## 2020-11-20 DIAGNOSIS — F33.2 SEVERE EPISODE OF RECURRENT MAJOR DEPRESSIVE DISORDER, WITHOUT PSYCHOTIC FEATURES (H): ICD-10-CM

## 2020-11-20 DIAGNOSIS — Z12.11 COLON CANCER SCREENING: ICD-10-CM

## 2020-11-20 DIAGNOSIS — Z13.1 SCREENING FOR DIABETES MELLITUS: ICD-10-CM

## 2020-11-20 DIAGNOSIS — E66.01 MORBID OBESITY (H): ICD-10-CM

## 2020-11-20 DIAGNOSIS — Z12.31 ENCOUNTER FOR SCREENING MAMMOGRAM FOR BREAST CANCER: ICD-10-CM

## 2020-11-20 DIAGNOSIS — I10 ESSENTIAL HYPERTENSION: ICD-10-CM

## 2020-11-20 DIAGNOSIS — F41.1 GAD (GENERALIZED ANXIETY DISORDER): Primary | ICD-10-CM

## 2020-11-20 PROCEDURE — 99215 OFFICE O/P EST HI 40 MIN: CPT | Mod: 95 | Performed by: INTERNAL MEDICINE

## 2020-11-20 RX ORDER — LORAZEPAM 0.5 MG/1
0.5 TABLET ORAL 2 TIMES DAILY PRN
Qty: 15 TABLET | Refills: 0 | Status: SHIPPED | OUTPATIENT
Start: 2020-11-20 | End: 2021-09-10

## 2020-11-20 RX ORDER — RAMELTEON 8 MG/1
8 TABLET, FILM COATED ORAL
Qty: 30 TABLET | Refills: 0 | Status: SHIPPED | OUTPATIENT
Start: 2020-11-20 | End: 2022-03-25

## 2020-11-20 RX ORDER — PROPRANOLOL HYDROCHLORIDE 80 MG/1
80 CAPSULE, EXTENDED RELEASE ORAL DAILY
Qty: 90 CAPSULE | Refills: 0 | Status: SHIPPED | OUTPATIENT
Start: 2020-11-20 | End: 2020-12-02

## 2020-11-20 RX ORDER — SERTRALINE HYDROCHLORIDE 100 MG/1
150 TABLET, FILM COATED ORAL DAILY
Qty: 135 TABLET | Refills: 0 | Status: SHIPPED | OUTPATIENT
Start: 2020-11-20 | End: 2020-12-16

## 2020-11-20 SDOH — HEALTH STABILITY: MENTAL HEALTH: HOW OFTEN DO YOU HAVE 6 OR MORE DRINKS ON ONE OCCASION?: NOT ASKED

## 2020-11-20 SDOH — HEALTH STABILITY: MENTAL HEALTH: HOW OFTEN DO YOU HAVE A DRINK CONTAINING ALCOHOL?: MONTHLY OR LESS

## 2020-11-20 SDOH — HEALTH STABILITY: MENTAL HEALTH: HOW MANY STANDARD DRINKS CONTAINING ALCOHOL DO YOU HAVE ON A TYPICAL DAY?: 1 OR 2

## 2020-11-20 NOTE — PROGRESS NOTES
"Tiffani Brasher is a 50 year old female who is being evaluated via a billable video visit.      The patient has been notified of following:      \"This video visit will be conducted via a call between you and your physician/provider. We have found that certain health care needs can be provided without the need for an in-person physical exam.  This service lets us provide the care you need with a video conversation.  If a prescription is necessary we can send it directly to your pharmacy.  If lab work is needed we can place an order for that and you can then stop by our lab to have the test done at a later time.    Video visits are billed at different rates depending on your insurance coverage.  Please reach out to your insurance provider with any questions.    If during the course of the call the physician/provider feels a video visit is not appropriate, you will not be charged for this service.\"    Patient has given verbal consent for Video visit? Yes  How would you like to obtain your AVS? MyChart  If you are dropped from the video visit, the video invite should be resent to: Text to cell phone: [phone]  Will anyone else be joining your video visit? No    Subjective     Tiffani Brasher is a 50 year old female who presents today via video visit for the following health issues:    HPI     Patient presenting to establish care and discuss chronic medical problems, reports she has terminated relationship with her psychiatrist.  She reports due to PTSD she has some \"difficulty dealing with doctors especially transition is difficult for her\".  She is maintained on sertraline as well as well as Adderall and lorazepam as needed and uses Rozerem for sleep.  She had a sleep study assigned but she states after the pandemic is over she will need get that study done. She reports had blood transfusion in the past due to syncopal episodes; she had significant anemia in the past.  She just moved to [place] on February " "2019, she had a lot of Gyn issues with her endometriosis and she had GYN surgery; she had a myomectomy and then had difficulty with surgery for hysterectomy.  She describes she sees a therapist 2 times per week for crisis counseling.  She is maintained on propranolol 80 mg that helps with her blood pressure and her headaches as well.  She was put on sertraline by Doc; her psychiatrist she was at 1-1/2 tablet daily 150 mg daily; reports has definitely affected in a positive way, she feels more balanced as far as her depression is concerned.  She has chronic issue with sleep she cannot fall asleep since she eliminated alcohol; can stay asleep for couple more hours but has trouble falling asleep; her mind keep racing;  alan puts her to sleep and had not been using using daily. She reports, Lorazepam works great for her, she takes 2 at a time at time, her anxiety she describes as debilitating; do not know if lorazepam is the right medicine for her, she would lay down takes a nap after she takes the medicine.  She has stopped drinking alcohol, she was drinking alcohol up until last May or June.  When she was in [place] marijuana was legalized, she had marijuana prescription, she reports sleep specialist told her to use CBD or THC.  She feels tired with trazodone, she has tried in the past hydroxyzine worked for her but made her feel drowsy next day plus lorazepam she has been taking for a while for anxiety attacks, she had only sent for couple prescription since 1 year of lorazepam she describes, although her  shows only 1 prescription back in December 2019.  She had history of significant anemia.  She states her blood pressure is down to the 120s over 90s but she has no BP recordings to give us, she states she \"does not like to take drugs that makes her dependent on,  especially blood pressure medications\".  She is wanting to lose weight and she wants to seek help through weight management clinic.    Video Start " Time: 10:30 AM        Review of Systems   Constitutional, HEENT, cardiovascular, pulmonary, GI, , musculoskeletal, neuro, skin, endocrine and psych systems are negative, except as otherwise noted.      Objective           Vitals:  No vitals were obtained today due to virtual visit.    Physical Exam     GENERAL: Healthy, alert and no distress  EYES: Eyes grossly normal to inspection.  No discharge or erythema, or obvious scleral/conjunctival abnormalities.  RESP: No audible wheeze, cough, or visible cyanosis.  No visible retractions or increased work of breathing.    SKIN: Visible skin clear. No significant rash, abnormal pigmentation or lesions.  NEURO: Cranial nerves grossly intact.  Mentation and speech appropriate for age.  PSYCH: Mentation appears normal, affect normal/bright, judgement and insight intact, normal speech and appearance well-groomed.      No results found for this or any previous visit (from the past 24 hour(s)).        Assessment & Plan   Problem List Items Addressed This Visit        Digestive    Morbid obesity (H)    Relevant Orders    COMPREHENSIVE WEIGHT MANAGEMENT       Circulatory    Essential hypertension    Relevant Medications    propranolol ER (INDERAL [place]) 80 MG 24 hr capsule    Other Relevant Orders    **Comprehensive metabolic panel FUTURE anytime    Albumin Random Urine Quantitative with Creat Ratio    MED THERAPY MANAGE REFERRAL       Hematologic    Anemia, iron deficiency    Relevant Orders    **CBC with platelets FUTURE anytime    Ferritin    Iron and iron binding capacity    MED THERAPY MANAGE REFERRAL       Behavioral    JAXON (generalized anxiety disorder) - Primary    Relevant Medications    LORazepam (ATIVAN) 0.5 MG tablet    sertraline (ZOLOFT) 100 MG tablet    Other Relevant Orders    **Comprehensive metabolic panel FUTURE anytime    TSH    MENTAL HEALTH REFERRAL  - Adult; Psychiatry; Psychiatry; [facility]: Collaborative Care Psychiatry Service/Bridge to Long-Term Psychiatry as  indicated ([phone]); Yes; Chronic Mental Health without improvement; Yes; We will contact you to schedule ...    MED THERAPY MANAGE REFERRAL    Severe episode of recurrent major depressive disorder, without psychotic features (H)    Relevant Medications    LORazepam (ATIVAN) 0.5 MG tablet    sertraline (ZOLOFT) 100 MG tablet    Other Relevant Orders    MENTAL HEALTH REFERRAL  - Adult; Psychiatry; Psychiatry; [facility]: [facility] Psychiatry Service/Bridge to Long-Term Psychiatry as indicated ([phone]); Yes; Chronic Mental Health without improvement; Yes; We will contact you to schedule ...    MED THERAPY MANAGE REFERRAL    PTSD (post-traumatic stress disorder)    Relevant Medications    LORazepam (ATIVAN) 0.5 MG tablet    sertraline (ZOLOFT) 100 MG tablet    Other Relevant Orders    MENTAL HEALTH REFERRAL  - Adult; Psychiatry; Psychiatry; [facility] Psychiatry Service/Bridge to Long-Term Psychiatry as indicated ([phone]); Yes; Chronic Mental Health without improvement; Yes; We will contact you to schedule ...    MED THERAPY MANAGE REFERRAL       Other    Insomnia, unspecified type    Relevant Medications    ROZEREM 8 MG tablet    Other Relevant Orders    **Comprehensive metabolic panel FUTURE anytime    TSH    MENTAL HEALTH REFERRAL  - Adult; Psychiatry; Psychiatry; [facility] Psychiatry Service/Bridge to Long-Term Psychiatry as indicated ([phone]); Yes; Chronic Mental Health without improvement; Yes; We will contact you to schedule ...    MED THERAPY MANAGE REFERRAL      Other Visit Diagnoses     Lipid screening        Relevant Orders    Lipid panel reflex to direct LDL Fasting    Screening for diabetes mellitus        Relevant Orders    **A1C FUTURE anytime    Encounter for screening mammogram for breast cancer        Relevant Orders    *MA Screening Digital Bilateral    Colon cancer screening        Relevant Orders    Fecal colorectal cancer screen (FIT)     "     Patient in agreement with mental health clinic referral, meanwhile we will give her one-time refill of lorazepam as well as Rozerem for sleep, advised not to mix medications, do not take with alcohol or driving or using fine machinery. Gave sertraline refill and propranolol refill, discussed on side effects of medications, call us with blood pressure readings.  We will need to repeat labs including CBC iron panel ferritin ,comprehensive panel, lipid panel ,HbA1c and TSH.  Referral placed to weight management program.  Patient is in agreement with our recommendations.  She will need as well mammogram. Referral also placed to MTM. Advised and explained to patient about policy for virtual visit as patent will need to be residing in [facility], and she was understanding    BMI:   Estimated body mass index is 42.18 kg/m  as calculated from the following:    Height as of 4/2/20: 1.778 m (5' 10\").    Weight as of 4/2/20: 133.4 kg (294 lb).   Weight management plan: Discussed healthy diet and exercise guidelines           MEDICATIONS:  Continue current medications without change  CONSULTATION/REFERRAL to MTM, weight management, and mental health clinic  Work on weight loss  Regular exercise  See Patient Instructions  Return in about 6 weeks (around 1/1/2021), or if symptoms worsen or fail to improve, for anxiety, depression, insomnia.    Megan Phillips MD  [facility]      Video-Visit Details    Type of service:  Video Visit    Video End Time:11:05 AM    Originating Location (pt. Location): Home    Distant Location (provider location):  [facility]     Platform used for Video Visit: Kenna          "

## 2020-11-20 NOTE — TELEPHONE ENCOUNTER
Patient had virtual visit today Friday 11-.    Fax was received to renew propranolol.  Appears other medications may need to be renewed as well based on dates?    Please renew as appropriate for patient in your office visit.    Original request was from pharmacy in [place], however I confirmed with the medical assistant working with you patient is back in [place] using a local [place] pharmacy.    Thank you.    RT Yanira (R)

## 2020-12-02 ENCOUNTER — VIRTUAL VISIT (OUTPATIENT)
Dept: SURGERY | Facility: CLINIC | Age: 50
End: 2020-12-02
Payer: COMMERCIAL

## 2020-12-02 ENCOUNTER — VIRTUAL VISIT (OUTPATIENT)
Dept: PHARMACY | Facility: CLINIC | Age: 50
End: 2020-12-02
Payer: COMMERCIAL

## 2020-12-02 VITALS — SYSTOLIC BLOOD PRESSURE: 142 MMHG | DIASTOLIC BLOOD PRESSURE: 91 MMHG

## 2020-12-02 VITALS — HEIGHT: 70 IN | BODY MASS INDEX: 41.95 KG/M2 | WEIGHT: 293 LBS

## 2020-12-02 DIAGNOSIS — G89.29 CHRONIC KNEE PAIN, UNSPECIFIED LATERALITY: ICD-10-CM

## 2020-12-02 DIAGNOSIS — G43.009 NONINTRACTABLE MIGRAINE, UNSPECIFIED MIGRAINE TYPE: ICD-10-CM

## 2020-12-02 DIAGNOSIS — F41.1 GAD (GENERALIZED ANXIETY DISORDER): ICD-10-CM

## 2020-12-02 DIAGNOSIS — I10 ESSENTIAL HYPERTENSION: ICD-10-CM

## 2020-12-02 DIAGNOSIS — F43.10 PTSD (POST-TRAUMATIC STRESS DISORDER): ICD-10-CM

## 2020-12-02 DIAGNOSIS — G47.00 INSOMNIA, UNSPECIFIED TYPE: ICD-10-CM

## 2020-12-02 DIAGNOSIS — M25.569 CHRONIC KNEE PAIN, UNSPECIFIED LATERALITY: ICD-10-CM

## 2020-12-02 DIAGNOSIS — R60.0 LOWER EXTREMITY EDEMA: ICD-10-CM

## 2020-12-02 DIAGNOSIS — Z13.228 SCREENING FOR ENDOCRINE, NUTRITIONAL, METABOLIC AND IMMUNITY DISORDER: ICD-10-CM

## 2020-12-02 DIAGNOSIS — Z78.9 TAKES DIETARY SUPPLEMENTS: ICD-10-CM

## 2020-12-02 DIAGNOSIS — E66.01 MORBID OBESITY WITH BMI OF 45.0-49.9, ADULT (H): ICD-10-CM

## 2020-12-02 DIAGNOSIS — I10 ESSENTIAL HYPERTENSION: Primary | ICD-10-CM

## 2020-12-02 DIAGNOSIS — L30.4 INTERTRIGO: ICD-10-CM

## 2020-12-02 DIAGNOSIS — Z71.85 VACCINE COUNSELING: ICD-10-CM

## 2020-12-02 DIAGNOSIS — Z13.0 SCREENING FOR ENDOCRINE, NUTRITIONAL, METABOLIC AND IMMUNITY DISORDER: ICD-10-CM

## 2020-12-02 DIAGNOSIS — E66.01 MORBID OBESITY WITH BMI OF 45.0-49.9, ADULT (H): Primary | ICD-10-CM

## 2020-12-02 DIAGNOSIS — Z13.29 SCREENING FOR ENDOCRINE, NUTRITIONAL, METABOLIC AND IMMUNITY DISORDER: ICD-10-CM

## 2020-12-02 DIAGNOSIS — F33.2 SEVERE EPISODE OF RECURRENT MAJOR DEPRESSIVE DISORDER, WITHOUT PSYCHOTIC FEATURES (H): ICD-10-CM

## 2020-12-02 DIAGNOSIS — Z13.21 SCREENING FOR ENDOCRINE, NUTRITIONAL, METABOLIC AND IMMUNITY DISORDER: ICD-10-CM

## 2020-12-02 PROCEDURE — 97802 MEDICAL NUTRITION INDIV IN: CPT | Mod: 95 | Performed by: DIETITIAN, REGISTERED

## 2020-12-02 PROCEDURE — 99605 MTMS BY PHARM NP 15 MIN: CPT | Mod: TEL | Performed by: PHARMACIST

## 2020-12-02 PROCEDURE — 99607 MTMS BY PHARM ADDL 15 MIN: CPT | Mod: TEL | Performed by: PHARMACIST

## 2020-12-02 PROCEDURE — 99214 OFFICE O/P EST MOD 30 MIN: CPT | Mod: 95 | Performed by: PHYSICIAN ASSISTANT

## 2020-12-02 RX ORDER — MENTHOL 5.8 MG/1
1 LOZENGE ORAL DAILY
Qty: 90 TABLET | Refills: 1 | Status: SHIPPED | OUTPATIENT
Start: 2020-12-02 | End: 2023-04-25

## 2020-12-02 ASSESSMENT — MIFFLIN-ST. JEOR: SCORE: 2139.28

## 2020-12-02 NOTE — PATIENT INSTRUCTIONS
Recommendations from today's MTM visit:                                                    MTM (medication therapy management) is a service provided by a clinical pharmacist designed to help you get the most of out of your medicines.     1. Talk to your pharmacy about getting Flu and Shingles vaccine.    2. You can try using magnesium 250 mg at night as needed for sleep.     3. Call to make an appointment with a new psychiatrist. There is room to increase your sertraline dose, which may be helpful for your anxiety. Talk with your new psychiatrist about this.      4. Schedule the labs that Dr. Phillips ordered, and also schedule a blood pressure check with a nurse at the clinic. If your blood pressure is high at that time, we may add lisinopril 5 mg.    It was great to speak with you today.  I value your experience and would be very thankful for your time with providing feedback on our clinic survey. You may receive a survey via email or text message in the next few days.     Next MTM visit: will follow up after labs and BP check.     To schedule another MTM appointment, please call the clinic directly or you may call the MTM scheduling line at [phone] or toll-free at [phone].     My Clinical Pharmacist's contact information:                                                      It was a pleasure talking with you today!  Please feel free to contact me with any questions or concerns you have.      Domitila Johnson, [place]  [facility] Resident  Pager: [phone]

## 2020-12-02 NOTE — PROGRESS NOTES
Agree with assessment, may need a higher dose of lisinopril such as 10 mg daily pending her BP readings.

## 2020-12-02 NOTE — Clinical Note
Michelle Phillips,     I had a pleasant visit with Tiffani today. I am thinking we might need to add something additional to propranolol for her BP. I will have her do the labs you ordered and also a nurse blood pressure check. Depending on that, I am thinking we could add lisinopril 5 mg. I will touch base with you again at that time.     Regarding anxiety, I think increasing sertraline could be a great idea. She was hesitant today but agrees to call to schedule with new psychiatrist and discuss it with them.     Let me know if you have any thoughts about the visit, otherwise I will reach back out after labs/bp check.     Thanks,     Domitila Johnson, PharmD  MTM Pharmacy Resident

## 2020-12-02 NOTE — PATIENT INSTRUCTIONS
It was great meeting you today!  Please call [phone] to schedule for 60 minute visit IN CLINIC with Laila DAMON and a 30 minute diet visit in January.          Bariatric Task List    Lose 5 lbs prior to surgery.  Goal Weight: 314 lbs  Have preoperative laboratory tests drawn.   Psychological Evaluation with MMPI and clearance for weight loss surgery.   Letter of support from therapist Judy Buenrostro  Letter or support from previous psychiatrist or last few visit notes  Achieve clearance from dietitian to see surgeon.  A total of 6 structured dietitian weight loss visits are required by your insurance.  Sleep Study  Cardiac clearance either from primary or cardiologist          [facility]  Psychologist List  Bariatric Psychological Assessments  When you call the psychologist's office,   Please specify you need a  screening psychological assessment for bariatric surgery.    This includes: a bariatric surgery evaluation and MMPI.  The report should be mailed /faxed to our office at [phone].      Psychologists are usually booked several weeks in advance; take this into consideration when you call them.   Please check with your insurance to see that the provider is covered.      [place] Providers:  [facility] (Sites located throughout [facility])  [place], [place], [place], [place] and [place] [phone]    Outside Providers:  [facility]--  Lita Ludwig MA, [facility]   [address], [address]   [place], [place] [id number] [phone]    [facility]--  Peace Cheng MS, [facility], [facility]   [address], [address]   [place] [address] [phone]    [facility]--  Jo Osei PsyD[place] LP   [address]   [place], [place] [id number] [phone]    [facility]  Wolf Valentin, PhD, LP   [email or web address]   [address], [address]   [place], [place] [address] [phone]    Bella Umanzor PsyD,  LP  ([facility] not in network)   [address] [place] [address], [address]   [place] [address]   [phone]    Diego Mendieta, PhD, LP   [facility]  [address] [place] [address]   [phone]    [facility]--  Chayito Carrillo, PhD, LP   [address]   [place] [address]   [phone]    [facility]    [address] [place] [address] [phone]    [facility]:  (see approved locations/providers)  *Provider MUST be licensed psychologist (LP).  Yuliya Barragan-Makenna Nieot PsyD/LP   [place]-Jayde Newell PsyD/LP, Roya Peters PsyD/LP   Isiah-Kip Kimball, PsyD/LP  Shade Hoover-Gem Christianson, PsyD/LP  Terri Sánchez-Shameka Moss PsyD/LP, Mamta Edmond PsyD/LP  Gary-Cielo Cervantes PhD/LP, Laura Fisher PhD/LP  [place]-Kyle Fong, PsyD/LP  [place]-Karli Guillaume PsyD/LP  [place]-Milla Patel PsyD/LP   [phone]                                  [facility]   Comprehensive Weight Management  [place] [facility]  [address], [address]  [place]  [address]  Phone:  [phone]  Fax:  [phone]                        Bariatric Letter of Clearance from Mental Health Provider        Dear Mental Health Provider:    This patient is seeking bariatric surgery.  Although he/she will undergo a separate bariatric psychological evaluation, it is important to us that mental health providers of our surgical patients are involved at all stages of the process.    Please write a letter of support including the following information:     1.  Is the patient under your care?  If so, for how long?   2.  Comment on the relevant diagnosis and any current related prescribed medications.   3.  From a mental health standpoint, do you feel the patient is stable?   4.  It is important that the patient have regular follow up initially and for the first year following surgery.  Are you able to see the patient for  this?        Yana Urrutia PA-C and Laila Vidal PA-C  [facility]      Please fax the letter of support to [phone].

## 2020-12-02 NOTE — PROGRESS NOTES
"Tiffani Brasher is a 50 year old female who is being evaluated via a billable video visit.      The patient has been notified of following:     \"This video visit will be conducted via a call between you and your physician/provider. We have found that certain health care needs can be provided without the need for an in-person physical exam.  This service lets us provide the care you need with a video conversation.  If a prescription is necessary we can send it directly to your pharmacy.  If lab work is needed we can place an order for that and you can then stop by our lab to have the test done at a later time.    Video visits are billed at different rates depending on your insurance coverage.  Please reach out to your insurance provider with any questions.    If during the course of the call the physician/provider feels a video visit is not appropriate, you will not be charged for this service.\"    Patient has given verbal consent for Video visit? Yes-done by MA  How would you like to obtain your AVS? MyChart    Will anyone else be joining your video visit? No        Video-Visit Details    Type of service:  Video Visit    Video Start Time: 11:03  Video End Time: 11:57    Originating Location (pt. Location): Home    Distant Location (provider location):  [facility] [place]     Platform used for Video Visit: Kenna Martinez RD, LD      New Bariatric Nutrition Consultation Note    Reason For Visit: Nutrition Assessment    Tiffani Brasher is a 50 year old presenting today for new bariatric nutrition consult.  Pt is interested in laparoscopic undecided.  Patient is accompanied by self.    Support System Reviewed With Patient 12/2/2020   Who do you have in your support network that can be available to help you for the first 2 weeks after surgery? Mother, Sister, Best Friend   Who can you count on for support throughout your weight loss surgery journey? Mother, Sister (she " "has had weight loss surgery), Friends       ANTHROPOMETRICS:  Estimated body mass index is 46.43 kg/m  as calculated from the following:    Height as of an earlier encounter on 12/2/20: 1.765 m (5' 9.5\").    Weight as of an earlier encounter on 12/2/20: 144.7 kg (319 lb).    Required weight loss goal pre-op: 5 lbs from initial consult weight (goal weight 314 lbs or less before surgery)       12/2/2020   I have tried the following methods to lose weight Watching portions or calories, Exercise, Weight Watchers, Atkins type diet (low carb/high protein), Pre packaged meals ex: Nutrisystem, Slimfast, Physician directed program       Weight Loss Questions Reviewed With Patient 12/2/2020   How long have you been overweight? From Middle age and beyond       SUPPLEMENT INFORMATION:  none    NUTRITION HISTORY:  Breakfast-2 eggs, toast or bagel with cream cheese or butter coffee within 1 hour of waking  Lunch-skips most day  Dinner-turkey sandwich  Or mashed potatoes, corn, broccoli, Hawaiian roll @ 5-7:00 pm  Snacks-after dinner- medium bag of chips(.99)  Beverages: 32 oz water, 12 oz coffee + flavored creamer, 2 bottles of wine per month, occasional cranberry juice    Recall Diet Questions Reviewed With Patient 12/2/2020   How many ounces of water, or other low calorie drinks, do you drink daily (8 oz=1 glass)? 32 oz   How many ounces of caffeine (coffee, tea, pop) do you drink daily (8 oz=1 glass)? 8 oz   How often do you drink alcohol? 2-4 times a month   If you do drink alcohol, how many drinks might you have in a day? (one drink = 5 oz. wine, 1 can/bottle of beer, 1 shot liquor) 3 or 4-2 bottles of wine per month       Eating Habits 12/2/2020   Do you have any dietary restrictions? No   Do you currently binge eat (eat a large amount of food in a short time)? No   Are you an emotional eater? No   Do you get up to eat after falling asleep? No       ADDITIONAL INFORMATION:  After hysterectomy pt gained a significant amount " of weight. She has been talking with PCP about her weight for a couple of years. Her sister who lives in [place] had weight loss surgery ~ 15 years ago .Patient does all of her own cooking. Currently she lives with her older (14 years older) sister. She is frustrated with her weight. She feels like she eats healthy foods, but struggles to reduce her weight.    Dining Out History Reviewed With Patient 12/2/2020   How often do you dine out? Rarely.   Where do you dine out? (select all that apply) sit-down restaurants   What types of food do you order when you dine out? [place], [place]        Physical Activity Reviewed With Patient 12/2/2020   How often do you exercise? 3 to 4 times per week   What is the duration of your exercise (in minutes)? 30 Minutes   What types of exercise do you do? walking, swimming (summer)   What keeps you from being more active?  Pain, Shortness of breath, Worried people will look at me       NUTRITION DIAGNOSIS:  Obesity r/t long history of self-monitoring deficit and excessive energy intake aeb BMI >30 kg/m2.    INTERVENTION:  Intervention Provided/Education Provided on post-op diet guidelines, vitamins/minerals essential post-operatively, GI anatomy of bariatric surgeries, ways to help prepare for post-op diet guidelines pre-operatively, portion/calorie-control.  Provided pt with list of goals RD contact information.      Questions Reviewed With Patient 12/2/2020   How ready are you to make changes regarding your weight? Number 1 = Not ready at all to make changes up to 10 = very ready. 10   How confident are you that you can change? 1 = Not confident that you will be successful making changes up to 10 = very confident. 9       Patient Understanding: fair-good  Expected Compliance: fair-good    GOALS:  1. Eat lunch daily or have a protein drink  Criteria for selecting a protein drink/8-12 ounces:  < 250 calories  15-30 grams protein  < 10 grams total fat  < 10 grams added sugar  2.  Reduce alcohol intake by 1/2 (1 bottle of wine per month)  3. Start: 1 multivitamin/ mineral, 600 mg calcium citrate 2X pre day(take at least 2 hours away from multivitamin/ mineral for best absorption) and 5000 international unit(s) vitamin D        Follow-Up:   Recommend 5 follow up visits to assist with lifestyle changes or per insurance.      Scotty Martinez RD, LD  [facility], [place]  [phone]

## 2020-12-02 NOTE — LETTER
Tiffani Brasher  December 2, 2020        Bariatric Task List        Lose 5 lbs prior to surgery.  Goal Weight: 314 lbs  Have preoperative laboratory tests drawn.     Psychological Evaluation with MMPI and clearance for weight loss surgery.     Letter of support from therapist Judy Buenrostro    Letter or support from previous psychiatrist or last few visit notes    Achieve clearance from dietitian to see surgeon.    A total of 6 structured dietitian weight loss visits are required by your insurance.    Sleep Study    Cardiac clearance either from primary or cardiologist

## 2020-12-02 NOTE — Clinical Note
Please arrange for Home Oxygen at 2.5 Liters/ min via Nasal Canula to be dispensed with rest and need 4L/with activitiy due to Systolic and diastolic CHF, acute [facility] Thank you for your referral

## 2020-12-03 ENCOUNTER — TELEPHONE (OUTPATIENT)
Dept: SURGERY | Facility: CLINIC | Age: 50
End: 2020-12-03

## 2020-12-03 NOTE — TELEPHONE ENCOUNTER
Called pharmacy at PA direction re: error message attached to yesterday's electronic prescriptions.  Pharmacy states rx have been received and filled.   Ness Orta RN on 12/3/2020 at 10:23 AM

## 2020-12-04 ENCOUNTER — TELEPHONE (OUTPATIENT)
Dept: PSYCHIATRY | Facility: CLINIC | Age: 50
End: 2020-12-04

## 2020-12-04 NOTE — TELEPHONE ENCOUNTER
PSYCHIATRY CLINIC PHONE INTAKE     SERVICES REQUESTED / INTERESTED IN          Med Management    Presenting Problem and Brief History                              What would you like to be seen for? (brief description):  Patient was referred for continued medication management by PCP after patient terminated the relationship with her previous psychiatrist, Desi Singleton, at [facility] who she saw for a little over one year. Patient reports this termination was a mutual decision and came after missing an appointment due to a family member having COVID-19. PCP has refilled her medications for now (sertraline, rozerum, and lorazepam) but patient is needing a new long term provider.   Have you received a mental health diagnosis? Yes   Which one (s): PTSD with medical professionals, JAXON, Anxiety, Depression  Is there any history of developmental delay?  No   Are you currently seeing a mental health provider?  Yes            Who / month last seen:  Therapy for last 2 years - Judy Buenrostro at [facility]  Do you have mental health records elsewhere?  Yes  Will you sign a release so we can obtain them?  Yes    Have you ever been hospitalized for psychiatric reasons?  No  Describe:      Do you have current thoughts of self-harm?  No    Do you currently have thoughts of harming others?  No       Substance Use History     Do you have any history of alcohol / illicit drug use?  No  Describe:    Have you ever received treatment for this?  No    Describe:       Social History     Who is the patient's a guardian?  No    Name / number:   Have you had an ACT team in last 12 months?  No  Describe:    Do you have any current or past legal issues?  No  Describe:    OK to leave a detailed voicemail?  Yes    Medical/ Surgical History                                   Patient Active Problem List   Diagnosis     Anemia, iron deficiency     Morbid obesity with BMI of 45.0-49.9, adult (H)     JAXON (generalized anxiety  disorder)     Severe episode of recurrent major depressive disorder, without psychotic features (H)     PTSD (post-traumatic stress disorder)     Insomnia, unspecified type     Essential hypertension     Intertrigo          Medications             Current Outpatient Medications   Medication Sig Dispense Refill     calcium carbonate-vitamin D (CALCIUM 600/VITAMIN D) 600-400 MG-UNIT chewable tablet Take one twice daily and at least 2 hours apart from iron. (Patient not taking: Reported on 12/2/2020) 180 tablet 1     cholecalciferol 125 MCG (5000 UT) CAPS Take 1 capsule (5,000 Units) by mouth daily (Patient not taking: Reported on 12/2/2020) 90 capsule 1     LORazepam (ATIVAN) 0.5 MG tablet Take 1 tablet (0.5 mg) by mouth 2 times daily as needed for anxiety 15 tablet 0     Multiple Vitamins-Iron (QC DAILY MULTIVITAMINS/IRON) TABS Take 1 tablet by mouth daily (Patient not taking: Reported on 12/2/2020) 90 tablet 1     propranolol ER (INDERAL LA) 80 MG 24 hr capsule Take 1 capsule (80 mg) by mouth daily 30 capsule 0     ROZEREM 8 MG tablet Take 1 tablet (8 mg) by mouth nightly as needed for sleep 30 tablet 0     sertraline (ZOLOFT) 100 MG tablet Take 1.5 tablets (150 mg) by mouth daily (Patient taking differently: Take 100 mg by mouth daily ) 135 tablet 0     zolpidem (AMBIEN) 5 MG tablet Take tablet by mouth 15 minutes prior to sleep, for Sleep Study (Patient not taking: Reported on 12/2/2020) 1 tablet 0         DISPOSITION      Completed phone screen with patient. Added to AGE wait list for first available provider.    Sandee Ko,

## 2020-12-09 ENCOUNTER — TELEPHONE (OUTPATIENT)
Dept: PHARMACY | Facility: CLINIC | Age: 50
End: 2020-12-09

## 2020-12-09 NOTE — TELEPHONE ENCOUNTER
Spoke with patient to inform her of plan for labs and nurse only blood pressure check. She will call to get these scheduled.     MTM will follow up after labs and BP check.     Domitila Johnson, PharmD  [facility] Pharmacy Resident

## 2020-12-16 DIAGNOSIS — I10 ESSENTIAL HYPERTENSION: ICD-10-CM

## 2020-12-16 DIAGNOSIS — F41.1 GAD (GENERALIZED ANXIETY DISORDER): ICD-10-CM

## 2020-12-16 NOTE — TELEPHONE ENCOUNTER
Fax from [facility] in [facility]  Will place XbyMehart message to patient to confirm she would like Rx to that pharmacy, not a local one.    RT Yanira (R)

## 2020-12-17 NOTE — TELEPHONE ENCOUNTER
Patient Contact    Attempt # 1    Was call answered?  No.  Left message on voicemail with information to call me back.    On call back:    Please see if pt wants her medications in [place] or [place] ([facility]    3 mo was sent over last mo to [facility] on [facility].    FYI pt can request non-controlled transfers between [facility].

## 2020-12-18 NOTE — TELEPHONE ENCOUNTER
See below, additionally med list has conflicting directions     Sig - Route: Take 1.5 tablets (150 mg) by mouth daily - Oral   Patient taking differently: Take 100 mg by mouth daily         Sent to pharmacy as: Sertraline HCl 100 MG Oral Tablet (ZOLOFT)     Left message asking patient to call back to call back or review and respond to Joel KRAMER RN

## 2020-12-21 NOTE — TELEPHONE ENCOUNTER
[facility]/[place] also now seeking refill of Propranolol 80mg.    Patient has read both [facility] messages sent to her on 12/16 and 12/18 but no response.  Also had phone call, no response.    Samantha Carmen, RT (R)

## 2020-12-22 RX ORDER — SERTRALINE HYDROCHLORIDE 100 MG/1
150 TABLET, FILM COATED ORAL DAILY
Qty: 45 TABLET | Refills: 0 | Status: SHIPPED | OUTPATIENT
Start: 2020-12-22 | End: 2021-09-10

## 2020-12-22 RX ORDER — PROPRANOLOL HYDROCHLORIDE 80 MG/1
80 CAPSULE, EXTENDED RELEASE ORAL DAILY
Qty: 30 CAPSULE | Refills: 0 | Status: SHIPPED | OUTPATIENT
Start: 2020-12-22 | End: 2021-03-19

## 2020-12-22 NOTE — TELEPHONE ENCOUNTER
Pt has read both [facility] messages and no response.  Has not responded to phone calls.    Please advise. Decline as appropriate, last scripts sent to [place] 1 mo ago

## 2020-12-23 ENCOUNTER — TELEPHONE (OUTPATIENT)
Dept: PHARMACY | Facility: CLINIC | Age: 50
End: 2020-12-23

## 2020-12-23 DIAGNOSIS — F41.1 GAD (GENERALIZED ANXIETY DISORDER): ICD-10-CM

## 2020-12-23 NOTE — TELEPHONE ENCOUNTER
Called patient to discuss scheduling labs and blood pressure check. She says she will do this over mychart, and she will also respond to nurse's mychart regarding sertraline. (She states she never increased to 1.5 tablets, and is still taking 1 tablet (100mg) of sertraline.)    Will route to nurse as FYI, but patient does state she will respond to the MyChart message herself to clarify all the questions.     Domitila Johnson, PharmD  [facility] Pharmacy Resident

## 2021-01-03 ENCOUNTER — HEALTH MAINTENANCE LETTER (OUTPATIENT)
Age: 51
End: 2021-01-03

## 2021-02-11 ENCOUNTER — TELEPHONE (OUTPATIENT)
Dept: SLEEP MEDICINE | Facility: CLINIC | Age: 51
End: 2021-02-11

## 2021-02-11 NOTE — TELEPHONE ENCOUNTER
Reason for call:  Other   Patient called regarding (reason for call): call back    Additional comments: per patient , she will like a call back to schedule sleep study .     Phone number to reach patient:  Home number on file [phone] (home)    Best Time:  Anytime     Can we leave a detailed message on this number?  NO    Travel screening: Not Applicable

## 2021-03-07 ENCOUNTER — HEALTH MAINTENANCE LETTER (OUTPATIENT)
Age: 51
End: 2021-03-07

## 2021-03-10 ENCOUNTER — TELEPHONE (OUTPATIENT)
Dept: SLEEP MEDICINE | Facility: CLINIC | Age: 51
End: 2021-03-10

## 2021-03-10 NOTE — TELEPHONE ENCOUNTER
Reason for call:  Other   Patient called regarding (reason for call): appointment  Additional comments:To reschedule sleep study please(originally scheduled 3/15/21, patient declined cancellation of study before having a new one scheduled).    Phone number to reach patient:  Home number on file [phone] (home)    Best Time:  Anytime    Can we leave a detailed message on this number?  YES    Travel screening: Not Applicable

## 2021-03-17 ENCOUNTER — VIRTUAL VISIT (OUTPATIENT)
Dept: PSYCHIATRY | Facility: CLINIC | Age: 51
End: 2021-03-17
Attending: SOCIAL WORKER
Payer: COMMERCIAL

## 2021-03-17 DIAGNOSIS — F43.10 PTSD (POST-TRAUMATIC STRESS DISORDER): ICD-10-CM

## 2021-03-17 DIAGNOSIS — F41.0 PANIC DISORDER WITHOUT AGORAPHOBIA: Primary | ICD-10-CM

## 2021-03-17 DIAGNOSIS — F41.1 GAD (GENERALIZED ANXIETY DISORDER): ICD-10-CM

## 2021-03-17 DIAGNOSIS — F33.1 MAJOR DEPRESSIVE DISORDER, RECURRENT EPISODE, MODERATE (H): ICD-10-CM

## 2021-03-17 PROCEDURE — 90791 PSYCH DIAGNOSTIC EVALUATION: CPT | Mod: HN

## 2021-03-17 NOTE — Clinical Note
Ervin Maurice, please see my documentation for the patient you're seeing tomorrow. I'm happy to consult briefly if you have time. Of note, Tiffani has PTSD from medical professionals and reports a history of not being listened to. Let me know if you have any questions. Thanks!    Mavis

## 2021-03-18 DIAGNOSIS — I10 ESSENTIAL HYPERTENSION: ICD-10-CM

## 2021-03-19 RX ORDER — PROPRANOLOL HYDROCHLORIDE 80 MG/1
80 CAPSULE, EXTENDED RELEASE ORAL DAILY
Qty: 30 CAPSULE | Refills: 0 | Status: SHIPPED | OUTPATIENT
Start: 2021-03-19 | End: 2021-08-13

## 2021-03-19 NOTE — TELEPHONE ENCOUNTER
Routing refill request to provider for review/approval because:  BP out of range:  Above 140/90    BP Readings from Last 3 Encounters:   11/20/20 (!) 142/91   05/15/20 (!) 153/109   04/23/19 117/78

## 2021-03-22 ENCOUNTER — NURSE TRIAGE (OUTPATIENT)
Dept: FAMILY MEDICINE | Facility: CLINIC | Age: 51
End: 2021-03-22

## 2021-03-22 NOTE — TELEPHONE ENCOUNTER
Additional Information    Negative: Followed an injury to chest    Negative: SEVERE chest pain    Negative: Pain also present in shoulder(s) or arm(s) or jaw    Negative: Difficulty breathing    Negative: Cocaine use within last 3 days    Negative: History of prior 'blood clot' in leg or lungs (i.e., deep vein thrombosis, pulmonary embolism)    Negative: Recent illness requiring prolonged bed rest (i.e., immobilization)    Negative: Hip or leg fracture in past 2 months (e.g, or had cast on leg or ankle)    Negative: Major surgery in the past month    Negative: Recent long-distance travel with prolonged time in car, bus, plane, or train (i.e., within past 2 weeks; 6 or more hours duration)    Negative: Heart beating irregularly or very rapidly    Negative: Chest pain lasting longer than 5 minutes    Negative: Intermittent chest pain and pain has been increasing in severity or frequency    Negative: Dizziness or lightheadedness    Negative: Coughing up blood    Negative: Patient sounds very sick or weak to the triager    Negative: Fever > 100.5 F (38.1 C)    Negative: Intermittent chest pains persist > 3 days    Negative: All other patients with chest pain    Negative: Patient wants to be seen    Negative: Intermittent mild chest pain lasting a few seconds each time    Protocols used: CHEST PAIN-A-OH

## 2021-03-22 NOTE — TELEPHONE ENCOUNTER
Janessa Olivares routed conversation to  [place] Triage 6 minutes ago (3:10 PM)      Tiffani Brasher  Patient Appointment Schedule Request Pool 1 hour ago (1:33 PM)        Appointment Request From: Tiffani Brasher     With Provider: Megan Phillips MD [[facility]]     Preferred Date Range: 3/26/2021 - 3/29/2021     Preferred Times: Any Time     Reason for visit: Request an Appointment     Comments:  Chest pain...

## 2021-03-22 NOTE — TELEPHONE ENCOUNTER
"Called patient     3 nights ago had something - pain in arm (right arm)- bicep area/annoying achy pain, in shoulder too     Felt like pain moved    Laid down, worsened    Spoke with brother in law next day - he had history of heart problems, she tried \"googling\" - he said usually left side vs right side on arm. She told him pain moving     He has a home EKG device - tried doing that and BP at his house, 125/70-something. EKG was \"perfect\" but HR was a little elevated (unsure of reading)     He told her to contact us     Went away, came back then gone     No left arm pain  No chest pain  No jaw pain   Denies HR concern or racing HR   No dizziness    Triaged per [facility] Triage Protocol, gave care advice which patient plans to follow.  See Care advice tab for more information.  Patent to call back if further questions or concerns. Pt scheduled a virtual visit for this tomorrow     Tika KRAMER RN          "
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
## 2021-03-23 ENCOUNTER — VIRTUAL VISIT (OUTPATIENT)
Dept: FAMILY MEDICINE | Facility: CLINIC | Age: 51
End: 2021-03-23
Payer: COMMERCIAL

## 2021-03-23 DIAGNOSIS — E66.01 MORBID OBESITY WITH BMI OF 45.0-49.9, ADULT (H): ICD-10-CM

## 2021-03-23 DIAGNOSIS — M79.621 PAIN OF RIGHT UPPER ARM: ICD-10-CM

## 2021-03-23 DIAGNOSIS — R07.89 ATYPICAL CHEST PAIN: Primary | ICD-10-CM

## 2021-03-23 PROCEDURE — 99214 OFFICE O/P EST MOD 30 MIN: CPT | Mod: 95 | Performed by: INTERNAL MEDICINE

## 2021-03-23 RX ORDER — HYDROXYZINE HYDROCHLORIDE 25 MG/1
25 TABLET, FILM COATED ORAL
COMMUNITY
End: 2022-07-22

## 2021-03-23 NOTE — PROGRESS NOTES
Tiffani is a 50 year old who is being evaluated via a billable video visit.      How would you like to obtain your AVS? MyChart  If the video visit is dropped, the invitation should be resent by: Text to cell phone: [phone]  Will anyone else be joining your video visit? No      Video Start Time: 9:23 am    Assessment & Plan   Problem List Items Addressed This Visit        Digestive    Morbid obesity with BMI of 45.0-49.9, adult (H)    Relevant Orders    COMPREHENSIVE WEIGHT MANAGEMENT      Other Visit Diagnoses     Atypical chest pain    -  Primary    Relevant Orders    D dimer, quantitative    Troponin I    EKG 12-lead complete w/read - Clinics    Pain of right upper arm        Relevant Orders    CK total         Probable right arm pain is musculoskeletal, but she did have a component of right upper chest pain associated.  Denies any pleuritic component of chest pain ,no difficulty breathing, no midsternal chest pain ,no radiation of the pain to the back.  Pain has completely subsided.  We will check a basic panel, EKG, troponin, D-dimer; patient denies any swelling of the right arm.  She does have risk factors including  morbid obesity and blood pressure slightly elevated, she will continue to monitor her blood pressure.  Once she comes in to do her labs, [advised to check her lipids, also chemistry], will do an EKG and check vital signs as well.  Reassurance given at same time.  Her brother-in-law did give her nitro pill to use as needed, advised her not to use, but notify us of any recurrence of the symptoms.  She requested referral to weight management program and we are in agreement with such.  All questions answered.  She will be doing sleep study next week.  Patient is not in any distress.         MEDICATIONS:  Continue current medications without change  See Patient Instructions    Return in about 4 weeks (around 4/20/2021), or if symptoms worsen or fail to improve, for As needed and if symptoms  "worsen, other.    Megan Phillips MD  [facility] ELA Nixon is a 50 year old who presents for the following health issues     HPI   Pain in arm right side, on biceps, moved down the right arm,     Brother in law had heart challenges,     Personal home Cardia EKG,  Normal    Pain was 3 days ago,     BP was 125/70, same day took \"home ecg\"    Right side, upper chest pain / upper torso, exacerbated with laying down , no pain with taking a deep breath,     No shoulder pain, no limitation with ROM of right shoulder or pain,    No heavy weight lifting, sleeps on left side,     When laying down to go to sleep,can feel heart beating and is irregular, \"skips a beat, until falls asleep\" .     No neck problem, except feels \"holding tension in front of neck\",     No weakness or tingling in arm , no clumsiness, no associated slurred speech or other neurologic deficit.    Did not take any pain medications, brother in law, said to take NITROGLYCERIN     Does not know if she has snoring, has sleep study upcoming this Monday          Review of Systems   Constitutional, HEENT, cardiovascular, pulmonary, gi and gu systems are negative, except as otherwise noted.      Objective    Vitals - Patient Reported  Systolic (Patient Reported): 125  Diastolic (Patient Reported): 70  Weight (Patient Reported): 137.4 kg (303 lb)  Height (Patient Reported): 176.5 cm (5' 9.5\")  BMI (Based on Pt Reported Ht/Wt): 44.1  Pulse (Patient Reported): 88      Vitals:  No vitals were obtained today due to virtual visit.    Physical Exam   GENERAL: Healthy, alert and no distress  EYES: Eyes grossly normal to inspection.  No discharge or erythema, or obvious scleral/conjunctival abnormalities.  RESP: No audible wheeze, cough, or visible cyanosis.  No visible retractions or increased work of breathing.    SKIN: Visible skin clear. No significant rash, abnormal pigmentation or lesions.  NEURO: Cranial nerves grossly intact.  " Mentation and speech appropriate for age.  PSYCH: Mentation appears normal, affect normal/bright, judgement and insight intact, normal speech and appearance well-groomed.    Orders Only on 12/13/2019   Component Date Value Ref Range Status     Sodium 12/13/2019 139  133 - 144 mmol/L Final     Potassium 12/13/2019 3.8  3.4 - 5.3 mmol/L Final     Chloride 12/13/2019 106  94 - 109 mmol/L Final     Carbon Dioxide 12/13/2019 26  20 - 32 mmol/L Final     Anion Gap 12/13/2019 7  3 - 14 mmol/L Final     Glucose 12/13/2019 86  70 - 99 mg/dL Final     Urea Nitrogen 12/13/2019 11  7 - 30 mg/dL Final     Creatinine 12/13/2019 0.68  0.52 - 1.04 mg/dL Final     GFR Estimate 12/13/2019 >90  >60 mL/min/[1.73_m2] Final    Comment: Non  GFR Calc  Starting 12/18/2018, serum creatinine based estimated GFR (eGFR) will be   calculated using the Chronic Kidney Disease Epidemiology Collaboration   (CKD-EPI) equation.       GFR Estimate If Black 12/13/2019 >90  >60 mL/min/[1.73_m2] Final    Comment:  GFR Calc  Starting 12/18/2018, serum creatinine based estimated GFR (eGFR) will be   calculated using the Chronic Kidney Disease Epidemiology Collaboration   (CKD-EPI) equation.       Calcium 12/13/2019 9.0  8.5 - 10.1 mg/dL Final     TSH 12/13/2019 0.77  0.40 - 4.00 mU/L Final               Video-Visit Details    Type of service:  Video Visit    Video End Time:9:22 AM    Originating Location (pt. Location): Home    Distant Location (provider location):  [facility]     Platform used for Video Visit: Jania

## 2021-03-24 ENCOUNTER — VIRTUAL VISIT (OUTPATIENT)
Dept: PSYCHIATRY | Facility: CLINIC | Age: 51
End: 2021-03-24
Attending: NURSE PRACTITIONER
Payer: COMMERCIAL

## 2021-03-24 DIAGNOSIS — F41.1 GAD (GENERALIZED ANXIETY DISORDER): Primary | ICD-10-CM

## 2021-03-24 PROCEDURE — 90792 PSYCH DIAG EVAL W/MED SRVCS: CPT | Mod: 95 | Performed by: NURSE PRACTITIONER

## 2021-03-24 ASSESSMENT — PAIN SCALES - GENERAL: PAINLEVEL: NO PAIN (0)
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
## 2021-03-24 NOTE — PATIENT INSTRUCTIONS
**For crisis resources, please see the information at the end of this document**     Patient Education      Thank you for coming to the [facility] MENTAL HEALTH & ADDICTION [place] [facility].    Lab Testing:  If you had lab testing today and your results are reassuring or normal they will be mailed to you or sent through mobile mum within 7 days. If the lab tests need quick action we will call you with the results. The phone number we will call with results is # [phone] (home) . If this is not the best number please call our clinic and change the number.    Medication Refills:  If you need any refills please call your pharmacy and they will contact us. Our fax number for refills is [phone]. Please allow three business for refill processing. If you need to  your refill at a new pharmacy, please contact the new pharmacy directly. The new pharmacy will help you get your medications transferred.     Scheduling:  If you have any concerns about today's visit or wish to schedule another appointment please call our office during normal business hours [phone] (8-5:00 M-F)    Contact Us:  Please call [phone] during business hours (8-5:00 M-F).  If after clinic hours, or on the weekend, please call  [phone].    Financial Assistance [phone]  [facility] Billing [phone]  Central Billing Office, [facility]: [phone]  [facility] Billing [phone]  Medical Records [phone]  [facility] Patient Bill of Rights [email or web address]       MENTAL HEALTH CRISIS NUMBERS:  For a medical emergency please call  911 or go to the nearest ER.     [facility]:   [facility] -[phone]   Crisis Residence [facility] -[phone]   Walk-In Counseling Center [facility] -[phone]   COPE 24/7 [place] Mobile Team -[phone] (adults)/[phone] (child)  CHILD: [facility] needs assessment  team - [phone]      [facility]:   [facility] - [phone]   Walk-in counseling [facility] - [phone]   Walk-in counseling [facility] - [phone]   Crisis Residence [facility] - [phone]  Urgent Care Adult Mental Ccjgzo-[phone] mobile unit/ 24/7 crisis line    National Crisis Numbers:   National Suicide Prevention Lifeline: 8-532-703-TALK (063-512-7066)  Poison Control Center - 5-409-012-4008  [employer]/resources for a list of additional resources (SOS)  Trans Lifeline a hotline for transgender people 1-435.554.1105  The [employer]ian Project a hotline for LGBT youth 8-075-183-8635  Crisis Text Line: For any crisis 24/7   To: 188646  see www.crisistextline.org  - IF MAKING A CALL FEELS TOO HARD, send a text!         Again thank you for choosing [facility] MENTAL HEALTH & ADDICTION [facility] and please let us know how we can best partner with you to improve you and your family's health.    You may be receiving a survey regarding this appointment. We would love to have your feedback, both positive and negative. The survey is done by an external company, so your answers are anonymous.

## 2021-03-29 ENCOUNTER — TELEPHONE (OUTPATIENT)
Dept: SLEEP MEDICINE | Facility: CLINIC | Age: 51
End: 2021-03-29

## 2021-03-29 ENCOUNTER — THERAPY VISIT (OUTPATIENT)
Dept: SLEEP MEDICINE | Facility: CLINIC | Age: 51
End: 2021-03-29
Payer: COMMERCIAL

## 2021-03-29 DIAGNOSIS — Z12.31 VISIT FOR SCREENING MAMMOGRAM: ICD-10-CM

## 2021-03-29 DIAGNOSIS — R40.0 SLEEPINESS: ICD-10-CM

## 2021-03-29 DIAGNOSIS — E66.01 MORBID OBESITY (H): ICD-10-CM

## 2021-03-29 DIAGNOSIS — R06.83 SNORING: ICD-10-CM

## 2021-03-29 DIAGNOSIS — Z12.31 ENCOUNTER FOR SCREENING MAMMOGRAM FOR BREAST CANCER: ICD-10-CM

## 2021-03-29 PROCEDURE — 77063 BREAST TOMOSYNTHESIS BI: CPT | Mod: TC | Performed by: RADIOLOGY

## 2021-03-29 PROCEDURE — 95810 POLYSOM 6/> YRS 4/> PARAM: CPT | Performed by: PSYCHIATRY & NEUROLOGY

## 2021-03-29 PROCEDURE — 77067 SCR MAMMO BI INCL CAD: CPT | Mod: TC | Performed by: RADIOLOGY

## 2021-03-29 NOTE — TELEPHONE ENCOUNTER
Pharmacy sent over refill request for Ambien 5 mg.  Pt did not fill due to just recently scheduling sleep study.    Rx has been called in and is ready for pt to .    EVANGELIST Rodriguez

## 2021-03-30 DIAGNOSIS — Z13.0 SCREENING FOR ENDOCRINE, NUTRITIONAL, METABOLIC AND IMMUNITY DISORDER: ICD-10-CM

## 2021-03-30 DIAGNOSIS — E78.5 HYPERLIPIDEMIA LDL GOAL <100: ICD-10-CM

## 2021-03-30 DIAGNOSIS — F41.1 GAD (GENERALIZED ANXIETY DISORDER): ICD-10-CM

## 2021-03-30 DIAGNOSIS — Z13.29 SCREENING FOR ENDOCRINE, NUTRITIONAL, METABOLIC AND IMMUNITY DISORDER: ICD-10-CM

## 2021-03-30 DIAGNOSIS — Z13.1 SCREENING FOR DIABETES MELLITUS: ICD-10-CM

## 2021-03-30 DIAGNOSIS — E55.9 VITAMIN D DEFICIENCY: Primary | ICD-10-CM

## 2021-03-30 DIAGNOSIS — G47.00 INSOMNIA, UNSPECIFIED TYPE: ICD-10-CM

## 2021-03-30 DIAGNOSIS — M79.621 PAIN OF RIGHT UPPER ARM: ICD-10-CM

## 2021-03-30 DIAGNOSIS — I10 ESSENTIAL HYPERTENSION: ICD-10-CM

## 2021-03-30 DIAGNOSIS — Z13.228 SCREENING FOR ENDOCRINE, NUTRITIONAL, METABOLIC AND IMMUNITY DISORDER: ICD-10-CM

## 2021-03-30 DIAGNOSIS — Z13.21 SCREENING FOR ENDOCRINE, NUTRITIONAL, METABOLIC AND IMMUNITY DISORDER: ICD-10-CM

## 2021-03-30 DIAGNOSIS — R07.89 ATYPICAL CHEST PAIN: ICD-10-CM

## 2021-03-30 DIAGNOSIS — Z13.220 LIPID SCREENING: ICD-10-CM

## 2021-03-30 DIAGNOSIS — E66.01 MORBID OBESITY WITH BMI OF 45.0-49.9, ADULT (H): ICD-10-CM

## 2021-03-30 DIAGNOSIS — D50.9 IRON DEFICIENCY ANEMIA, UNSPECIFIED IRON DEFICIENCY ANEMIA TYPE: ICD-10-CM

## 2021-03-30 LAB
ALBUMIN SERPL-MCNC: 3.3 G/DL (ref 3.4–5)
ALP SERPL-CCNC: 75 U/L (ref 40–150)
ALT SERPL W P-5'-P-CCNC: 35 U/L (ref 0–50)
ANION GAP SERPL CALCULATED.3IONS-SCNC: 4 MMOL/L (ref 3–14)
AST SERPL W P-5'-P-CCNC: 21 U/L (ref 0–45)
BILIRUB SERPL-MCNC: 0.4 MG/DL (ref 0.2–1.3)
BUN SERPL-MCNC: 13 MG/DL (ref 7–30)
CALCIUM SERPL-MCNC: 8.7 MG/DL (ref 8.5–10.1)
CHLORIDE SERPL-SCNC: 109 MMOL/L (ref 94–109)
CHOLEST SERPL-MCNC: 233 MG/DL
CK SERPL-CCNC: 190 U/L (ref 30–225)
CO2 SERPL-SCNC: 26 MMOL/L (ref 20–32)
CREAT SERPL-MCNC: 0.6 MG/DL (ref 0.52–1.04)
CREAT UR-MCNC: 240 MG/DL
D DIMER PPP FEU-MCNC: 0.5 UG/ML FEU (ref 0–0.5)
DEPRECATED CALCIDIOL+CALCIFEROL SERPL-MC: 8 UG/L (ref 20–75)
ERYTHROCYTE [DISTWIDTH] IN BLOOD BY AUTOMATED COUNT: 14.8 % (ref 10–15)
FERRITIN SERPL-MCNC: 24 NG/ML (ref 8–252)
GFR SERPL CREATININE-BSD FRML MDRD: >90 ML/MIN/{1.73_M2}
GLUCOSE SERPL-MCNC: 106 MG/DL (ref 70–99)
HBA1C MFR BLD: 5.4 % (ref 0–5.6)
HCT VFR BLD AUTO: 41.4 % (ref 35–47)
HDLC SERPL-MCNC: 64 MG/DL
HGB BLD-MCNC: 13.2 G/DL (ref 11.7–15.7)
IRON SATN MFR SERPL: 24 % (ref 15–46)
IRON SERPL-MCNC: 67 UG/DL (ref 35–180)
LDLC SERPL CALC-MCNC: 145 MG/DL
MCH RBC QN AUTO: 31.1 PG (ref 26.5–33)
MCHC RBC AUTO-ENTMCNC: 31.9 G/DL (ref 31.5–36.5)
MCV RBC AUTO: 97 FL (ref 78–100)
MICROALBUMIN UR-MCNC: 20 MG/L
MICROALBUMIN/CREAT UR: 8.38 MG/G CR (ref 0–25)
NONHDLC SERPL-MCNC: 169 MG/DL
PLATELET # BLD AUTO: 248 10E9/L (ref 150–450)
POTASSIUM SERPL-SCNC: 4.1 MMOL/L (ref 3.4–5.3)
PROT SERPL-MCNC: 7.3 G/DL (ref 6.8–8.8)
RBC # BLD AUTO: 4.25 10E12/L (ref 3.8–5.2)
SODIUM SERPL-SCNC: 139 MMOL/L (ref 133–144)
TIBC SERPL-MCNC: 285 UG/DL (ref 240–430)
TRIGL SERPL-MCNC: 118 MG/DL
TROPONIN I SERPL-MCNC: <0.015 UG/L (ref 0–0.04)
TSH SERPL DL<=0.005 MIU/L-ACNC: 1.39 MU/L (ref 0.4–4)
WBC # BLD AUTO: 5.9 10E9/L (ref 4–11)

## 2021-03-30 PROCEDURE — 82306 VITAMIN D 25 HYDROXY: CPT | Performed by: INTERNAL MEDICINE

## 2021-03-30 PROCEDURE — 84443 ASSAY THYROID STIM HORMONE: CPT | Performed by: INTERNAL MEDICINE

## 2021-03-30 PROCEDURE — 83036 HEMOGLOBIN GLYCOSYLATED A1C: CPT | Performed by: INTERNAL MEDICINE

## 2021-03-30 PROCEDURE — 83550 IRON BINDING TEST: CPT | Performed by: INTERNAL MEDICINE

## 2021-03-30 PROCEDURE — 85379 FIBRIN DEGRADATION QUANT: CPT | Performed by: INTERNAL MEDICINE

## 2021-03-30 PROCEDURE — 85027 COMPLETE CBC AUTOMATED: CPT | Performed by: INTERNAL MEDICINE

## 2021-03-30 PROCEDURE — 82728 ASSAY OF FERRITIN: CPT | Performed by: INTERNAL MEDICINE

## 2021-03-30 PROCEDURE — 80053 COMPREHEN METABOLIC PANEL: CPT | Performed by: INTERNAL MEDICINE

## 2021-03-30 PROCEDURE — 82550 ASSAY OF CK (CPK): CPT | Performed by: INTERNAL MEDICINE

## 2021-03-30 PROCEDURE — 84484 ASSAY OF TROPONIN QUANT: CPT | Performed by: INTERNAL MEDICINE

## 2021-03-30 PROCEDURE — 80061 LIPID PANEL: CPT | Performed by: INTERNAL MEDICINE

## 2021-03-30 PROCEDURE — 83540 ASSAY OF IRON: CPT | Performed by: INTERNAL MEDICINE

## 2021-03-30 PROCEDURE — 82043 UR ALBUMIN QUANTITATIVE: CPT | Performed by: INTERNAL MEDICINE

## 2021-03-30 PROCEDURE — 36415 COLL VENOUS BLD VENIPUNCTURE: CPT | Performed by: INTERNAL MEDICINE

## 2021-03-30 RX ORDER — ATORVASTATIN CALCIUM 20 MG/1
20 TABLET, FILM COATED ORAL DAILY
Qty: 90 TABLET | Refills: 0 | Status: SHIPPED | OUTPATIENT
Start: 2021-03-30 | End: 2021-08-13

## 2021-03-30 NOTE — PROGRESS NOTES
Medication Therapy Management (MTM) Encounter    ASSESSMENT:                            Medication Adherence/Access: No issues identified    Hypertension: Needs improvement. She is not meeting BP goal of <130/80 mmHg today. Discussed that starting an additional medication for blood pressure, such as lisinopril, is recommended. After discussing this, she prefers to work on her anxiety and consider the lisinopril for a while before starting it. Will follow up in 1 month for a re-check and discuss adding lisinopril at that time.     Obestiy: Plan in place to follow up with comprehensive weight management.        Hyperlipidemia: Although her ASCVD risk is <5% with her BP reading today, using some of her previous readings (such as 153/109 mmHg), her risk increases to 6.5%. Given her additional risk factors of obesity and hypertension, it is reasonable her her to start the statin as prescribed by Dr. Phillips. Discussed risks and benefits of this today and she is agreeable to this.     Immunizations: She is agreeable to getting the COVID vaccine. She wants to talk to her friend (who will drive her) before scheduling this. She says she knows how to schedule over mychart. Discussed that she will be due for Shingrix, Vapoqpapk92, Tdap, and influenza 14 days after her last COVID dose.      Depression/anxiety/PTSD: Needs improvement. Since she has found sertraline helpful with no side effects, could increase this to 150 mg (as it is prescribed). Although hesitant in the past, she would like to do this today. Discussed that she can consult with her psychiatrist regarding changes to lorazepam. Could consider adding buspirone to augment her sertraline to help with her anxiety.      Insomnia: Plan in place to follow up with sleep specialist on 4/13.      Supplements: Reviewed instructions of her new weekly vitamin D and iron that were recommended by Dr. Phillips. Discussed that she can take her calcium tablets, even though they also  have vitamin D in them. She has had trouble with constipation in the past with iron, so a daily stool softener may be helpful for this.     PLAN:                            1. Start taking Vitamin D 50,000 units once weekly. You can take your calcium and vitamin D 600 mg-400 units twice daily with this.     2. Start taking over the counter slo-iron once every other day. Take this with vitamin C or orange juice. Since you have had trouble with constipation with iron before, start taking over the counter stool softener, docusate 50 mg, once daily.     3. Start taking 1.5 tablets of sertraline (150 mg) once daily as prescribed. Talk to your psychiatrist about starting  Buspirone (Buspar). Ally will also reach out to your psychiatrist about this recommendation.     4. Schedule your COVID vaccine over [employer].    Follow-up: 1 month    SUBJECTIVE/OBJECTIVE:                          Tiffani Brasher is a 50 year old female coming in for a follow-up visit. She was referred to me from Dr. Phillips.  Today's visit is a follow-up MTM visit from 12/02/20.    Reason for visit: Anxiety.    Allergies/ADRs: Reviewed in chart  Tobacco: She reports that she has never smoked. She has never used smokeless tobacco.  Alcohol: 2-3 bottles of wine per month.    Medication Adherence/Access: Patient tends to prefer to not be on medications if possible, but she recognizes that her medications are important for her health.     Hypertension: Current medications include propranolol ER 80 mg. At the time when this was started (June 2020), she had also been having migraines so the intent was for this to help with both issues.  Patient does self-monitor blood pressure. She has pictures of the readings today, and they are quite variable (between 110's and low 150's).  Patient reports no current medication side effects.     BP Readings from Last 3 Encounters:   11/20/20 (!) 142/91   05/15/20 (!) 153/109   04/23/19 117/78     Obestiy: Patient was  "referred to comprehensive weight management by Dr. Phillips, and she has a visit scheduled for this on 4/21. She says she is considering bariatric surgery.      Hyperlipidemia: Current therapy includes atorvastatin 20 mg daily. This was just prescribed yesterday by Dr. Phillips, so she has not started this yet.     The 10-year ASCVD risk score (Mainor OROZCO Jr., et al., 2013) is: 1.8%    Values used to calculate the score:      Age: 50 years      Sex: Female      Is Non- : No      Diabetic: No      Tobacco smoker: No      Systolic Blood Pressure: 130 mmHg      Is BP treated: Yes      HDL Cholesterol: 64 mg/dL      Total Cholesterol: 233 mg/dL  Recent Labs   Lab Test 03/30/21  0855 04/11/19  1252   CHOL 233* 193   HDL 64 58   * 110*   TRIG 118 110     Immunizations: She has not received any vaccinations in many years per [facility] and per her report. She is wondering about getting the COVID vaccine today.      Depression/anxiety/PTSD: Patient has established with new psychiatry provider Josafat NERI. She says liked him, and felt \"very heard, very seen, and it was easy to talk to him.\" She says no medication changes were made when she saw him because they wanted to wait until after she established with Dr. Phillips, had labs done, and had her sleep study done. She continues crisis counseling twice weekly with Dr. Judy Buenrostro. She continues taking sertraline 100 mg once daily (prescribed for 150 mg), and lorazepam 0.5 mg. She denies side effects from these. She says \"I do believe the Sertraline has helped me with the depression by my anxiety but my anxiety attacks are not really helped by the Lorazepam.\" She says she has acute anxiety attacks where it is hard to breathe. She describes a numb wave washing over her from the back of her head. She says she feels very fragile. Anxiety attacks are about 6 times per month, and that is when she uses the lorazepam. She is wanting alternatives for her " "anxiety attacks (she mentions xanax, valium). She is also taking hydroxyzine for sleep, and she knows that this may help with anxiety as well. She is very stressed about her health and all her doctors visits, and her job is very stressful as well. This is impacting her quality of life.     Insomnia: Currently taking rozerem 8 mg and hydroxyzine 25 mg as needed. She thinks the hydroxyzine is helpful for falling asleep, but not staying asleep. She knows the Rozerem helps her stay asleep. Therefore she takes one or the other, or both, depending on what she needs help with that particular night. She says she feels \"sluggish\" all day. She had a sleep study done this week, and she has follow up with sleep specialist scheduled on 4/13 to discuss the results. She thinks that her trouble sleeping is playing a big role in her mood.     Supplements: She had labs done yesterday including vitamin D and iron. She was recommended vitamin D 50,000 units weekly as well as iron every other day by Dr. Phillips. She has not started these yet. She had been taking a multivitamin, but she ran out of them. She has not been taking the calcium with vitamin D that was on her medication list. She had been recommended to take the calcium with vitamin D by previous weight loss provider.      Lab Results   Component Value Date    VITDT 8 (L) 03/30/2021     Ferritin   Date Value Ref Range Status   03/30/2021 24 8 - 252 ng/mL Final     Iron   Date Value Ref Range Status   03/30/2021 67 35 - 180 ug/dL Final     Iron Binding Cap   Date Value Ref Range Status   03/30/2021 285 240 - 430 ug/dL Final     Today's Vitals: BP (!) 130/93   Pulse 84   Wt (!) 335 lb (152 kg)   LMP 04/04/2019   BMI 48.76 kg/m    ----------------    I spent 60 minutes with this patient today. All changes were made via collaborative practice agreement with Dr. Phillips. A copy of the visit note was provided to the patient's primary care provider.    The patient was given a " summary of these recommendations.     Domitila Johnson, PharmD  [facility] Pharmacy Resident    The patient was seen independently by Dr. Johnson.  I have read the note and agree with the assessment and plan.  Paula AraujoD, [facility]  Medication Therapy Management Provider  Pager: [phone]        Medication Therapy Recommendations  JAXON (generalized anxiety disorder)    Current Medication: sertraline (ZOLOFT) 100 MG tablet (Discontinued)   Rationale: Dose too low - Dosage too low - Effectiveness   Recommendation: Increase Dose - sertraline 100 MG tablet - Recommended patient to consider increaseing sertraline dose. She declines today but will speak with new psychiatrist. Will continue to monitor.   Status: Patient Agreed - Adherence/Education   Note: Update - patient agrees with this recommendation today.         Vaccine counseling    Rationale: Preventive therapy - Needs additional medication therapy - Indication   Recommendation: Provide Education - COVID vaccine   Status: Patient Agreed - Adherence/Education

## 2021-03-31 ENCOUNTER — OFFICE VISIT (OUTPATIENT)
Dept: PHARMACY | Facility: CLINIC | Age: 51
End: 2021-03-31
Payer: COMMERCIAL

## 2021-03-31 VITALS
BODY MASS INDEX: 48.76 KG/M2 | DIASTOLIC BLOOD PRESSURE: 93 MMHG | SYSTOLIC BLOOD PRESSURE: 130 MMHG | WEIGHT: 293 LBS | HEART RATE: 84 BPM

## 2021-03-31 DIAGNOSIS — Z71.85 VACCINE COUNSELING: ICD-10-CM

## 2021-03-31 DIAGNOSIS — Z78.9 TAKES DIETARY SUPPLEMENTS: ICD-10-CM

## 2021-03-31 DIAGNOSIS — F41.1 GAD (GENERALIZED ANXIETY DISORDER): ICD-10-CM

## 2021-03-31 DIAGNOSIS — I10 ESSENTIAL HYPERTENSION: Primary | ICD-10-CM

## 2021-03-31 DIAGNOSIS — E78.5 HYPERLIPIDEMIA LDL GOAL <100: ICD-10-CM

## 2021-03-31 DIAGNOSIS — F43.10 PTSD (POST-TRAUMATIC STRESS DISORDER): ICD-10-CM

## 2021-03-31 DIAGNOSIS — E66.01 MORBID OBESITY WITH BMI OF 45.0-49.9, ADULT (H): ICD-10-CM

## 2021-03-31 DIAGNOSIS — G47.00 INSOMNIA, UNSPECIFIED TYPE: ICD-10-CM

## 2021-03-31 DIAGNOSIS — F33.2 SEVERE EPISODE OF RECURRENT MAJOR DEPRESSIVE DISORDER, WITHOUT PSYCHOTIC FEATURES (H): ICD-10-CM

## 2021-03-31 PROCEDURE — 99606 MTMS BY PHARM EST 15 MIN: CPT | Performed by: PHARMACIST

## 2021-03-31 PROCEDURE — 99607 MTMS BY PHARM ADDL 15 MIN: CPT | Performed by: PHARMACIST

## 2021-03-31 NOTE — PATIENT INSTRUCTIONS
Recommendations from today's MTM visit:                                                       1. Start taking Vitamin D 50,000 units once weekly. You can take your calcium and vitamin D 600 mg-400 units twice daily with this.     2. Start taking over the counter slo-iron once every other day. Take this with vitamin C or orange juice. Since you have had trouble with constipation with iron before, start taking over the counter stool softener, docusate 50 mg, once daily.     3. Start taking 1.5 tablets of sertraline (150 mg) once daily as prescribed. Talk to your psychiatrist about starting buspirone (Buspar).     4. Schedule your COVID vaccine over TappTime.    Next MTM visit: 4/26 at 1:30 pm    It was great to speak with you today.  I value your experience and would be very thankful for your time with providing feedback on our clinic survey. You may receive a survey via email or text message in the next few days.     To schedule another MT appointment, please call the clinic directly or you may call the MTM scheduling line at [phone] or toll-free at [phone].     My Clinical Pharmacist's contact information:                                                      Please feel free to contact me with any questions or concerns you have.      Domitila Johnson, [place]  [facility] Resident  Pager: [phone]

## 2021-04-07 NOTE — PROCEDURES
" SLEEP STUDY INTERPRETATION  DIAGNOSTIC POLYSOMNOGRAPHY REPORT      Patient: REJI DESIR  YOB: 1970  Study Date: 3/29/2021  MRN: [id number]  Referring Provider: No Referring MD  Ordering Provider: Goltz, PA-C, [place]    Indications for Polysomnography: The patient is a 50 year old Female who is 5' 10\" and weighs 294.0 lbs. Her BMI is 42.6, [place] sleepiness scale 10 and neck circumference is 41 cm. Relevant medical history includes obesity, snoring, witnessed apneas. A diagnostic polysomnogram was performed to evaluate for sleep apnea.    Polysomnogram Data: A full night polysomnogram recorded the standard physiologic parameters including EEG, EOG, EMG, ECG, nasal and oral airflow. Respiratory parameters of chest and abdominal movements were recorded with respiratory inductance plethysmography. Oxygen saturation was recorded by pulse oximetry. Hypopnea scoring rule used: 1B 4%.    Sleep Architecture: Sleep fragmentation  The total recording time of the polysomnogram was 471.4 minutes. The total sleep time was 412.5 minutes. Sleep latency was 20.4 minutes. REM latency was 266.0 minutes. Arousal index was 49.0 arousals per hour. Sleep efficiency was 87.5%. Wake after sleep onset was 37.0 minutes. The patient spent 11.3% of total sleep time in Stage N1, 69.1% in Stage N2, 15.2% in Stage N3, and 4.5% in REM. Time in REM supine was 0 minutes.    Respiration: Moderate MAIN with borderline hypoxemia.  Likely an underestimation due to lack of REM supine.     Events ? The polysomnogram revealed a presence of 13 obstructive, 1 central, and 2 mixed apneas resulting in an apnea index of 2.3 events per hour. There were 156 obstructive hypopneas and - central hypopneas resulting in an obstructive hypopnea index of 22.7 and central hypopnea index of - events per hour. The combined apnea/hypopnea index was 25.0 events per hour (central apnea/hypopnea index was 0.1 events per hour). The REM AHI was 77.8 events per " hour. The supine AHI was 99.7 events per hour. The RERA index was 7.0 events per hour.  The RDI was 32.0 events per hour.    Snoring - was reported as mild-loud.    Respiratory rate and pattern - was notable for normal respiratory rate and pattern.    Sustained Sleep Associated Hypoventilation - Transcutaneous carbon dioxide monitoring was not used.    Sleep Associated Hypoxemia - (Greater than 5 minutes O2 sat at or below 88%) was borderline. Baseline oxygen saturation was 95.2%. Lowest oxygen saturation was 80.3%. Time spent less than or equal to 88% was 3.8 minutes. Time spent less than or equal to 89% was 6.2 minutes.    Movement Activity:     Periodic Limb Activity - There were 26 PLMs during the entire study. The PLM index was 3.8 movements per hour. The PLM Arousal Index was 1.5 per hour.    REM EMG Activity - Excessive muscle activity was not present.    Nocturnal Behavior - Abnormal sleep related behaviors were not noted.    Bruxism - None apparent.    Cardiac Summary: Sinus  The average pulse rate was 73.6 bpm. The minimum pulse rate was 46.9 bpm while the maximum pulse rate was 91.2 bpm.      Assessment:     Moderate MAIN with borderline hypoxemia.  Likely an underestimation due to lack of REM supine.    Recommendations:    MAIN can be treated with: autotitration CPAP, oral appliance, upper airway surgery.    Advice regarding the risks of drowsy driving.    Suggest optimizing sleep schedule and avoiding sleep deprivation.    Weight management       Diagnostic Codes:   Obstructive Sleep Apnea G47.33    3/29/2021 [facility] Study (294.0 lbs) - AHI 25.0, RDI 32.0, Supine AHI 99.7, REM AHI 77.8, Low O2 80.3%, Time Spent ?88% 3.8 minutes / Time Spent ?89% 6.2 minutes.        _________________ ____________________   Electronically Signed By: Kye Carias MD 4-7-21

## 2021-04-08 LAB — SLPCOMP: NORMAL

## 2021-04-12 ENCOUNTER — MYC MEDICAL ADVICE (OUTPATIENT)
Dept: SLEEP MEDICINE | Facility: CLINIC | Age: 51
End: 2021-04-12

## 2021-04-13 ENCOUNTER — VIRTUAL VISIT (OUTPATIENT)
Dept: SLEEP MEDICINE | Facility: CLINIC | Age: 51
End: 2021-04-13
Payer: COMMERCIAL

## 2021-04-13 DIAGNOSIS — G47.33 OSA (OBSTRUCTIVE SLEEP APNEA): Primary | ICD-10-CM

## 2021-04-13 PROCEDURE — 99214 OFFICE O/P EST MOD 30 MIN: CPT | Mod: 95 | Performed by: PHYSICIAN ASSISTANT

## 2021-04-13 NOTE — PATIENT INSTRUCTIONS
You will be provided with an auto-titrating CPAP with a pressure range of 5-15 cm with heated humidity to limit nasal congestion. Adjust the heat level on humidifier to find a setting that prevents dry nose but does not cause condensation in the hose or mask. Use distilled water in the humidifier.  The CPAP has a ramp function that starts the pressure lower than your prescribed pressure and gradually increases it over a number of minutes.  This may make it easier to fall asleep.    Try to use the CPAP every-night, all night (minimum of 4 hours). Many insurances require that we prove you are using the CPAP at least 4 hours on at least 70% of nights over a 30 day period. We have 90 days to meet those criteria.            Discussed weight management and the impact of weight gain on sleep apnea.  Let me know if you snore or feel the pressure is too high.    You can get new supplies (mask, hose and filter) for your CPAP every 3-6 months, covered by insurance. You do not need to get supplies that often, but they are available if you would like them.  You may exchange the mask once within the first month if you feel the initial mask does not fit well.  Contact your medical equipment provider for equipment issues.  Please let me know if you have any return of snoring, daytime sleepiness or poor sleep quality. We will want to make sure your CPAP is adequately treating your apnea.  There is a website called CPAP.com that has accessories that may make CPAP use easier. Please visit it at your convenience.  Our phone number is [phone].    Follow-up 2 month.  Bring your CPAP machine with you to the follow up appointment.

## 2021-04-13 NOTE — PROGRESS NOTES
Tiffani is a 50 year old who is being evaluated via a billable video visit.      How would you like to obtain your AVS? MyChart  If the video visit is dropped, the invitation should be resent by: Text to cell phone: [phone]  Will anyone else be joining your video visit? No      Video Start Time: 2:01 PM  Video-Visit Details    Type of service:  Video Visit    Video End Time:2:35 PM    Originating Location (pt. Location): Home    Distant Location (provider location):  [facility] SLE[facility]     Platform used for Video Visit: Camalize SL    Sleep Follow-Up Visit:    Date on this visit: 4/13/2021    Tiffani Brasher comes in today for follow-up of her sleep study done on 3/29/21. She was initially seen for difficulty falling asleep and staying asleep, much worse in the last year. Her medical history is significant for morbid obesity, depression/anxiety. She is considering bariatric surgery. Her blood pressure has been up as well.     Sleep latency 20.4 minutes without Ambien.  REM achieved.   REM latency 266 minutes.  Sleep efficiency 87.5%. Total sleep time 412.5 minutes.    Sleep architecture:  Stage 1, 11.3% (5%), stage 2, 69.1% (45-55%), stage 3, 15.2% (15-20%), stage REM, 4.5% (20-25%).  AHI was 25/hr (0.6% centrals), with desaturations down to 80%. She spent 6.2 minutes below 90% SpO2 and 3.8 minutes below 89% SpO2. RDI 32/hr.  REM AHI and RDI 77.8/hr, consistent with severe REM MAIN (18.5 minutes in REM).  Supine .9/hr (AHI 99.7/hr), consistent with severe SUPINE MAIN (62 min supine). Her non-supine AHI was 11.8/hr, RDI 15.9/hr.  Periodic Limb Movement Index 3.8/hour, 1.5/hr associated with arousals.       CPAP titration:  CPAP was not initiated due to COVID precautions. These findings were reviewed with the patient.    Hydroxyzine helps her fall asleep but she still wakes often. Mirtazapine does not help her fall asleep but helps her stay asleep.    Past medical/surgical history, family  history, social history, medications and allergies were reviewed.      Problem List:  Patient Active Problem List    Diagnosis Date Noted     Intertrigo 12/02/2020     Priority: Medium     Insomnia, unspecified type 11/20/2020     Priority: Medium     Essential hypertension 11/20/2020     Priority: Medium     Morbid obesity with BMI of 45.0-49.9, adult (H) 04/11/2019     Priority: Medium     Anemia, iron deficiency 04/09/2019     Priority: Medium     PTSD (post-traumatic stress disorder) 03/01/2019     Priority: Medium     JAXON (generalized anxiety disorder) 10/08/2018     Priority: Medium     Severe episode of recurrent major depressive disorder, without psychotic features (H) 10/08/2018     Priority: Medium        Impression/Plan:    (G47.33) MAIN (obstructive sleep apnea)  (primary encounter diagnosis)  Comment: moderate MAIN overall, AHI 25/hr. Severe in REM (all lateral) and when supine. Supine /hr, REM AHI 78/hr. She would prefer to not have to use CPAP. She is starting to work with the weight loss clinic for consideration of bariatric surgery and will need to use CPAP for that reason.  Plan: Comprehensive DME        We reviewed treatment options including CPAP, dental appliance, weight loss, positional restriction and ENT surgery. The patient was interested in CPAP. I am prescribing auto CPAP 5-15 cm, heated humidifier and compliance meter. The patient was informed of the mask exchange policy and the compliance goals. They were also informed that they would be followed by the sleep therapy management team during the first month of CPAP use. We discussed that compliance would be required to proceed with the weight loss surgery, but there is a chance she could resolve her apnea through weight loss.       She will follow up with me in about 2 month(s).     38 minutes were spent on the date of the encounter doing chart review, history and exam, documentation and further activities as noted above.     Ajit  Goltz, PA-C    CC: Megan Phillips MD

## 2021-04-14 NOTE — NURSING NOTE
Orders to [facility] for new cpap device.    [facility]annah Newell [facility]

## 2021-04-16 ENCOUNTER — TELEPHONE (OUTPATIENT)
Dept: SLEEP MEDICINE | Facility: CLINIC | Age: 51
End: 2021-04-16

## 2021-04-21 ENCOUNTER — VIRTUAL VISIT (OUTPATIENT)
Dept: SURGERY | Facility: CLINIC | Age: 51
End: 2021-04-21
Payer: COMMERCIAL

## 2021-04-21 VITALS — HEIGHT: 69 IN | BODY MASS INDEX: 43.4 KG/M2 | WEIGHT: 293 LBS

## 2021-04-21 VITALS — HEIGHT: 70 IN | BODY MASS INDEX: 41.95 KG/M2 | WEIGHT: 293 LBS

## 2021-04-21 DIAGNOSIS — E66.01 MORBID OBESITY WITH BMI OF 45.0-49.9, ADULT (H): Primary | ICD-10-CM

## 2021-04-21 DIAGNOSIS — I10 ESSENTIAL HYPERTENSION: ICD-10-CM

## 2021-04-21 DIAGNOSIS — G47.33 OSA (OBSTRUCTIVE SLEEP APNEA): ICD-10-CM

## 2021-04-21 DIAGNOSIS — L30.4 INTERTRIGO: ICD-10-CM

## 2021-04-21 DIAGNOSIS — E66.01 MORBID OBESITY WITH BMI OF 45.0-49.9, ADULT (H): ICD-10-CM

## 2021-04-21 PROCEDURE — 97803 MED NUTRITION INDIV SUBSEQ: CPT | Mod: 95 | Performed by: DIETITIAN, REGISTERED

## 2021-04-21 PROCEDURE — 99215 OFFICE O/P EST HI 40 MIN: CPT | Mod: 95 | Performed by: PHYSICIAN ASSISTANT

## 2021-04-21 ASSESSMENT — MIFFLIN-ST. JEOR
SCORE: 2203.93
SCORE: 2211.86

## 2021-04-21 NOTE — PATIENT INSTRUCTIONS
"Ervin Nixon!    It was great chatting with you today! Here are some links to the information we discussed:      Vitamins/Minerals:  [email or web address]     Diet Guidelines:  [email or web address]       If tracking intake, \"Baritastic\" is a great krissy. 1477-6691 calories per day would be a perfect amount for you.     Regarding metabolism, here's the specific things we discussed that play a role in how fast your metabolism goes:  1. Eat balanced meals at regular intervals: 1st meal within 1h of waking, then avoid going longer than 6h without food  2. Exercise for 30-60 minutes most days of the week, and include 2-3 days of strength training.   3. Eat enough protein - aim for 60-90 grams per day  4. Make sure you're eating enough - avoid eating less than 1200 calories per day.      Here are the goals we discussed for this next month:  1. Begin taking a daily multivitamin and calcium supplement - the doses/requirements for these are in the vitamin/mineral handout link above. The easiest schedule for you will be to take calcium 2-3x/ per day, and take everything else at bedtime.  Continue to take your weekly Vitamin D.  2. Limit wine to 1 bottle per month  3. Eat 3 meals per day: 1st meal within 1h of waking, then meals every 4-6 hours.        Your next visit can be scheduled via the call center at  and should be in one month. You will also need to see Laila next month for an in-clinic appointment. Your visit with Laila should be 60 minutes, and your RD visit should be 30 minutes. Let us know if any questions/concerns pop up!     Bridgett Torrez, JOHN, LD  Clinical Dietitian   "

## 2021-04-21 NOTE — PROGRESS NOTES
"Tiffani is a 50 year old who is being evaluated via a billable video visit.      If the video visit is dropped, the invitation should be resent by: Text to cell phone: [phone]  Will anyone else be joining your video visit? No      Video-Visit Details    Type of service:  Video Visit    Video Start Time:  2:13    Video End Time: 2:42    41 minutes spent on the date of the encounter doing chart review, review of test results, patient visit and documentation       Originating Location (pt. Location): Home    Distant Location (provider location):  [facility] [place]     Platform used for Video Visit: Ubidyne         Pre Op Virtual Bariatric Surgery Note      2021    RE: Tiffani Brasher  MR#: [id number]  : 1970      Referring provider:       2021   Who referred you? Dr. Phillips       Chief Complaint/Reason for visit: evaluation for possible weight loss surgery    Dear Jacqueline, Megan Elizabeth MD (General),    I had the pleasure of seeing your patient, Tiffani Brashre, to evaluate her obesity and consider her for possible weight loss surgery. As you know, Tiffani Brasher is 50 year old.  She has a height of 5' 9\", a weight of 335 lbs 0 oz, and calculated Body mass index is 49.47 kg/m .    Patient started the process for bariatric surgery 2020 but has not been seen since. Here to reestablish care.       HISTORY OF PRESENT ILLNESS:  Weight Loss History Reviewed with Patient 2021   How long have you been overweight? Since late 20's to early 40's   What is the most that you have ever weighed? 335   What is the most weight you have lost? 70   I have tried the following methods to lose weight Watching portions or calories, Exercise, Weight Watchers, Atkins type diet (low carb/high protein), OTC Medications, Prescription Medications, Physician directed program   I have tried the following weight loss medications? (Check all that apply) Xenical/Orlistat/Gunner " "  Have you ever had weight loss surgery? No     Had HCG injections and lost 70 lbs.        CO-MORBIDITIES OF OBESITY INCLUDE:     4/21/2021   I have the following health issues associated with obesity: High Blood Pressure, High Cholesterol, Sleep Apnea, Polycystic Ovarian Syndrome, Infertility, Stress Incontinence     Just diagnosed with MAIN and had not gotten CPAP yet.  Plans on getting it next week.     PAST MEDICAL HISTORY:  Past Medical History:   Diagnosis Date     Anemia      Anemia, iron deficiency 4/9/2019     Essential hypertension 11/20/2020     JAXON (generalized anxiety disorder) 10/8/2018     Insomnia, unspecified type 11/20/2020     MAIN (obstructive sleep apnea) 4/13/2021     PTSD (post-traumatic stress disorder) 3/1/2019     Severe episode of recurrent major depressive disorder, without psychotic features (H) 10/8/2018     Is being evaluated by cardiology. From initial visit:    Patient feels like heart will \"hiccup\" all of a certain.  No chest pain or SOB. Has not been evaluated     History of iron deficiency but had hysterectomy 4/2019  Has PTSD when talking to medical providers.  Feels that as a women of color has not been heard.    Currently seeing therapist since Feb 2019 Judy Buenrostro weekly basis  [facility].  Very difficult relationship. Terminated relationship recently.  Saw for a year and a half. Will be looking into a new psychiatrist  Has bad anxiety. A lot stemmed from PTSD related to her hysterectomy       PAST SURGICAL HISTORY:  Past Surgical History:   Procedure Laterality Date     GYN SURGERY  2007    myomectomy     LAPAROSCOPIC HYSTERECTOMY TOTAL N/A 4/23/2019    Procedure: single site total laparoscopic hysterectomy, lysis of adhesion, drainage of ovarian cyst;  Surgeon: Vicki Hamilton MD;  Location: [facility] OR     ORTHOPEDIC SURGERY Right     knee      No personal or family history of blood clots or anesthesia concerns      FAMILY HISTORY:   Family " History   Problem Relation Age of Onset     Hypertension Mother      Sleep Apnea Sister      Obesity Sister       Sister had bariatric surgery 2011.      SOCIAL HISTORY:   Social History Questions Reviewed With Patient 4/21/2021   Which best describes your employment status (select all that apply) I am unemployed. Not disabled   If you work, what is your occupation?    Which best describes your marital status: single   Do you have children? No   Who do you have in your support network that can be available to help you for the first 2 weeks after surgery? Mom, Sister   Who can you count on for support throughout your weight loss surgery journey? Family and Friends   Can you afford 3 meals a day?  Yes   Can you afford 50-60 dollars a month for vitamins? Yes       HABITS:     4/21/2021   How often do you drink alcohol? 2-4 times a month   If you do drink alcohol, how many drinks might you have in a day? (one drink = 5 oz. wine, 1 can/bottle of beer, 1 shot liquor) 3 or 4   Have you or are you currently using street drugs or prescription strength medication for which you do not have a prescription for? No   Do you have a history of chemical dependency (alcohol or drug abuse)? No       PSYCHOLOGICAL HISTORY:   Psychological History Reviewed With Patient 4/21/2021   Have you ever attempted suicide? Never.   Have you had thoughts of suicide in the past year? No   Have you ever been hospitalized for mental illness or a suicide attempt? Never.   Do you have a history of chronic pain? Yes   Have you ever been diagnosed with fibromyalgia? No   Are you currently being treated for any of the following? (select all that apply) Depression, Anxiety, Post traumatic stress disorder   Are you currently seeing a therapist or counselor?  Yes   Are you currently seeing a psychiatrist? Yes       ROS:     4/21/2021   Skin:  Skin fold rashes (groin or other folds)   HEENT: Headaches   Musculoskeletal: Joint Pain, Back pain, Limited mobility    Cardiovascular: Shortness of breath with activity, Heart murmurs   Pulmonary: Shortness of breath with activity, Snoring   Gastrointestinal: None of the above   Genitourinary: Stress incontinence (losing urine when coughing, sneezing, etc.)   Hematological: History of blood transfusions   Neurological: Migraine headaches   Female only: Excessive menstrual bleeding, Irregular menstrual cycles, Post-menopausal       EATING BEHAVIORS:     4/21/2021   Have you or anyone else thought that you had an eating disorder? No   Do you currently binge eat (eat a large amount of food in a short time)? Yes   Are you an emotional eater? No   Do you get up to eat after falling asleep? No       EXERCISE:     4/21/2021   How often do you exercise? 1 to 2 times per week   What is the duration of your exercise (in minutes)? 30 Minutes   What types of exercise do you do? walking, swimming, home gym, group fitness classes   What keeps you from being more active?  Pain, My ability to walk or move around is limited, Shortness of breath, Worried people will look at me       MEDICATIONS:  Current Outpatient Medications   Medication Sig Dispense Refill     atorvastatin (LIPITOR) 20 MG tablet Take 1 tablet (20 mg) by mouth daily 90 tablet 0     calcium carbonate-vitamin D (CALCIUM 600/VITAMIN D) 600-400 MG-UNIT chewable tablet Take one twice daily and at least 2 hours apart from iron. 180 tablet 1     docusate sodium (COLACE) 50 MG capsule Take 50 mg by mouth daily as needed for constipation       ferrous sulfate dried 160 (50 Fe) MG tablet Take 1 tablet by mouth every other day Take with vitamin C or orange juice.       hydrOXYzine (ATARAX) 25 MG tablet Take 25 mg by mouth Takes to initiate sleep takes as needed. Has Екатерина that alternates in       LORazepam (ATIVAN) 0.5 MG tablet Take 1 tablet (0.5 mg) by mouth 2 times daily as needed for anxiety 15 tablet 0     Multiple Vitamins-Iron (QC DAILY MULTIVITAMINS/IRON) TABS Take 1 tablet by  "mouth daily 90 tablet 1     propranolol ER (INDERAL LA) 80 MG 24 hr capsule Take 1 capsule (80 mg) by mouth daily 30 capsule 0     ROZEREM 8 MG tablet Take 1 tablet (8 mg) by mouth nightly as needed for sleep 30 tablet 0     sertraline (ZOLOFT) 100 MG tablet Take 1.5 tablets (150 mg) by mouth daily 45 tablet 0     vitamin D3 (CHOLECALCIFEROL) 1.25 MG (67811 UT) capsule Take 1 capsule (50,000 Units) by mouth every 7 days 12 capsule 0       ALLERGIES:  No Known Allergies    LABS/IMAGING/MEDICAL RECORDS REVIEW: [facility]     PHYSICAL EXAM:   5' 9\" (1.753 m)   Wt (!) 335 lb (152 kg)   LMP 04/04/2019   BMI 49.47 kg/m    GENERAL: Healthy, alert and no distress  EYES: Eyes grossly normal to inspection.  No discharge or erythema, or obvious scleral/conjunctival abnormalities.  RESP: No audible wheeze, cough, or visible cyanosis.  No visible retractions or increased work of breathing.    SKIN: Visible skin clear. No significant rash, abnormal pigmentation or lesions.  NEURO: Cranial nerves grossly intact.  Mentation and speech appropriate for age.  PSYCH: Mentation appears normal, affect normal/bright, judgement and insight intact, normal speech and appearance well-groomed.      In summary, Tiffani Brasher has Class III obesity with a Body mass index is 49.47 kg/m .  and the comorbidities stated above. She completed an informational seminar and is a candidate for the laparoscopic gastric bypass.  She will have to complete the following pre-requisites:    Lose 5 lbs prior to surgery.  Goal Weight: 314 lbs (from 12/2/20 visit)  Have preoperative laboratory tests drawn.   Psychological Evaluation with MMPI and clearance for weight loss surgery.   Letter of support from therapist Judy Buenrostro  Letter or support from Dr Ovalles whos is   Achieve clearance from dietitian to see surgeon.  A total of 6 structured dietitian weight loss visits are required by your insurance.  Sleep clearance  Cardiac clearance either " cardiologist  Last visit notes from Dr Singleton  Repeat vitamin D    Today in the office we discussed patients medical history.  She is restarting the process.  Will come IN CLINIC next month for a 60 minute visit.  Will discuss the gastric sleeve and the gastric bypass along with preoperative, perioperative, and postoperative processes, management, and follow up. Risks and benefits will also be addressed      .  Sincerely,     Laila Vidal PA-C

## 2021-04-21 NOTE — PATIENT INSTRUCTIONS
It was great seeing you today! Please call [phone] and schedule with Laila Vidal PA-C in May for a 60 minute visit as well as your second diet visit.  Please make sure to let the  know that the visit will be IN PERSON.         Task List:    Lose 5 lbs prior to surgery.  Goal Weight: 314 lbs (from 12/2/2020 visit)  Have preoperative laboratory tests drawn.   Psychological Evaluation with MMPI and clearance for weight loss surgery.   Letter of support from therapist Judy Buenrostro  Letter or support from Dr Velázquez whos is psychiatrist  Last visit notes from Dr Singleton  Achieve clearance from dietitian to see surgeon.  A total of 6 structured dietitian weight loss visits are required by your insurance.  Sleep clearance  Cardiac clearance either cardiologist  Repeat vitamin D          .

## 2021-04-21 NOTE — PROGRESS NOTES
"Tiffani is a 50 year old who is being evaluated via a billable video visit.      How would you like to obtain your AVS? MyChart  Will anyone else be joining your video visit? No      Video Start Time: 3:03pm      Video-Visit Details    Type of service:  Video Visit    Video End Time: 3:39pm    Originating Location (pt. Location): Home    Distant Location (provider location):  [facility] [place]     Platform used for Video Visit: [employer]     PRE SURGICAL WEIGHT LOSS NUTRITION APPOINTMENT    Tiffani Brasher  1970  female  [id number]  50 year old    ASSESSMENT    Desired Surgical Procedure: (undecided)    REASON FOR VISIT:  Tiffani Brasher is a 50 year old year old female presents today for a pre-surgical weight loss follow-up appointment. Patient accompanied by self.    DIAGNOSIS:  Weight Status Obesity Grade III BMI >40    ANTHROPOMETRICS:  Height: 5' 9.5\"   Initial Weight: 319 lbs      Current weight: 335 lbs 0 oz   BMI: Body mass index is 48.76 kg/m .    VITAMINS AND MINERALS:  None      NUTRITION HISTORY:  Breakfast: [wakes at 9-10:30am, eats within 90 minutes] 1-2 eggs + bagel (cream cheese), muffin or toast (butter)   Lunch: [4:30-5pm, with sister and BHAVANA] starch + meat  pasta with meat sauce  rice + corn on the cob  Supper: (skips)  Snacks: evenings - grapes + cheese, peanut butter on crackers   Fluids Consumed:  Coffee (marcelino cold sunita, black), water (64oz), wine (1-2 bottles/week but not every week)  Eating slower: No  Chewing foods thoroughly: sometimes  Take 20-30 minutes to consume each meal: No  Fluids and meals separate by at least 30 minutes: sometimes  Carbonation: none  Caffeine: yes  Additional Information: Pt last seen in December of 2020 to start bariatric surgery program. Pt has lived with sister and brother and law since start of pandemic - pt's sister does all grocery shopping. Pt feels increasing access to vaccines will soon yield greater influence " over food choices as she can move home and go back to preparing own meals. Pt wakes/goes to bed late - discussed having third meal later in evening rather than grazing. Discussed appropriate starch intake. Recommended track via [employer]. Discussed expectations for behavior change in anticipation of surgery. Discussed specific things pt can do to help with metabolism.     PHYSICAL ACTIVITY:  Type: walking, inconsistent     DIAGNOSIS:  Previous Nutrition Diagnosis: Obesity related to long history of self- monitoring deficit and excessive energy intake evidenced by BMI of 46.43 kg/m2  No change, modified below    Previous goals:   1. Eat lunch daily or have a protein drink - partially met  Criteria for selecting a protein drink/8-12 ounces:  < 250 calories  15-30 grams protein  < 10 grams total fat  < 10 grams added sugar  2. Reduce alcohol intake by 1/2 (1 bottle of wine per month) - not met  3. Start: 1 multivitamin/ mineral, 600 mg calcium citrate 2X pre day(take at least 2 hours away from multivitamin/ mineral for best absorption) and 5000 international unit(s) vitamin D - not met    Current Nutrition Diagnosis: Obesity related to long history of self-monitoring deficit and excessive energy intake as evidenced by BMI of 48.76 kg/m2.    INTERVENTION:  Nutrition Prescription: Recommended energy/nutrient modification.    GOALS:  Begin taking multivitamin, calcium and vitamin D per guidelines  Limit wine to 1 bottle per month  Eat 3 meals/day    Intervention:  - Discussed progress towards previous goals.  - Reinforced importance of making behavior changes in preparation for bariatric surgery.   - Assessed learning needs and learning preferences       NUTRITION MONITORING AND EVALUATION:  Anticipated compliance: fair-good  Patient demonstrated good understanding.     Follow up: Continue to monitor patient closely regarding weight loss and diet.  # of visits needed: 4  Cleared by RD: No     TIME SPENT WITH PATIENT: 36  nichelle Torrez RD, LD  Clinical Dietitian

## 2021-05-05 ENCOUNTER — OFFICE VISIT (OUTPATIENT)
Dept: PHARMACY | Facility: CLINIC | Age: 51
End: 2021-05-05
Payer: COMMERCIAL

## 2021-05-12 ENCOUNTER — OFFICE VISIT (OUTPATIENT)
Dept: PHARMACY | Facility: CLINIC | Age: 51
End: 2021-05-12
Payer: COMMERCIAL

## 2021-05-12 VITALS
BODY MASS INDEX: 49.04 KG/M2 | SYSTOLIC BLOOD PRESSURE: 130 MMHG | WEIGHT: 293 LBS | HEART RATE: 75 BPM | DIASTOLIC BLOOD PRESSURE: 90 MMHG

## 2021-05-12 DIAGNOSIS — E78.5 HYPERLIPIDEMIA LDL GOAL <100: ICD-10-CM

## 2021-05-12 DIAGNOSIS — Z71.85 VACCINE COUNSELING: ICD-10-CM

## 2021-05-12 DIAGNOSIS — G47.00 INSOMNIA, UNSPECIFIED TYPE: ICD-10-CM

## 2021-05-12 DIAGNOSIS — F33.2 SEVERE EPISODE OF RECURRENT MAJOR DEPRESSIVE DISORDER, WITHOUT PSYCHOTIC FEATURES (H): ICD-10-CM

## 2021-05-12 DIAGNOSIS — Z78.9 TAKES DIETARY SUPPLEMENTS: ICD-10-CM

## 2021-05-12 DIAGNOSIS — F41.1 GAD (GENERALIZED ANXIETY DISORDER): ICD-10-CM

## 2021-05-12 DIAGNOSIS — I10 ESSENTIAL HYPERTENSION: Primary | ICD-10-CM

## 2021-05-12 DIAGNOSIS — G43.009 NONINTRACTABLE MIGRAINE, UNSPECIFIED MIGRAINE TYPE: ICD-10-CM

## 2021-05-12 DIAGNOSIS — G47.33 OSA (OBSTRUCTIVE SLEEP APNEA): ICD-10-CM

## 2021-05-12 DIAGNOSIS — E66.01 MORBID OBESITY WITH BMI OF 45.0-49.9, ADULT (H): ICD-10-CM

## 2021-05-12 DIAGNOSIS — F43.10 PTSD (POST-TRAUMATIC STRESS DISORDER): ICD-10-CM

## 2021-05-12 PROCEDURE — 99607 MTMS BY PHARM ADDL 15 MIN: CPT | Performed by: PHARMACIST

## 2021-05-12 PROCEDURE — 99606 MTMS BY PHARM EST 15 MIN: CPT | Performed by: PHARMACIST

## 2021-05-12 NOTE — PROGRESS NOTES
Medication Therapy Management (MTM) Encounter    ASSESSMENT:                            Medication Adherence/Access: No issues identified.     Hypertension: Discussed that her blood pressure is right on the border for systolic, and ideally we would like to get her diastolic <80 mmHg. She continues to be very reluctant to start medications (and is just getting used to the idea of her statin). Given her blood pressure is not >140/90 mmHg, will continue to monitor things for another few months before adding additional medications.     Migraines: Since she has not discussed her migraines with Dr. Phillips and it does not appear she has a formal diagnosis of migraines, would recommend she talk with Dr. Phillips about this.     Hyperlipidemia: Stable.      Immunizations: Discussed again today the risks and benefits of the vaccine as well as the mechanism of action. She is warming up to the idea of getting the COVID vaccine but will continue to think about it.      Depression/anxiety/PTSD: Improving. Plan in place to follow up with psychiatry.     Insomnia/MAIN: Discussed the importance of using CPAP every night. She is agreeable to this.     Obesity: Plan in place to follow up with weight loss clinic to work towards bariatric surgery.     Supplements: Stable.  Discussed adherence to calcium supplements.     PLAN:                            1. Talk with Dr. Phillips about your migraines.    2. Start using your CPAP machine.     3. Start taking your calcium and vitamin D twice daily.     Follow-up: Return in about 3 months (around 8/12/2021).    SUBJECTIVE/OBJECTIVE:                          Tiffani Brasher is a 50 year old female coming in for a follow-up visit. She was referred to me from Dr. Phillips.  Today's visit is a follow-up MTM visit from 3/31/21.      Reason for visit: Hypertension follow up.    Allergies/ADRs: Reviewed in chart  Tobacco: She reports that she has never smoked. She has never used smokeless  tobacco.  Alcohol: 2-3 bottles of wine per month    Medication Adherence/Access: It took her a couple weeks to decide to take her atorvastatin, but she did decide to start taking it and has been taking it regularly for the past several weeks.     Hypertension: Current medications include propranolol ER 80 mg. Patient reports no current medication side effects. At the time when this was started (June 2020), she had also been having migraines so the intent was for this to help with both issues.  She reports that she has been having more migraines/headaches lately.     BP Readings from Last 3 Encounters:   03/31/21 (!) 130/93   11/20/20 (!) 142/91   05/15/20 (!) 153/109     Migraines: She had a bad migraine recently that lasted for about 48 hours. She had nausea, light sensitivity, sensitivity to smells, and she had an aura. She felt like being in a cool dark environment was helpful. The pain started out across the top of her head then localized to the right, front side of her head. She did not try any medications for her pain. She says she gets a migraine once every 8-12 weeks. She says she has not talked with Dr. Phillips about her migraines.     Hyperlipidemia: Current therapy includes atorvastatin 20 mg daily (started after our last visit).  Patient reports no significant myalgias or other side effects.    Recent Labs   Lab Test 03/30/21  0855 04/11/19  1252   CHOL 233* 193   HDL 64 58   * 110*   TRIG 118 110     Immunizations: She has not received any vaccinations in many years per [facility] and per her report. At last visit, discussed getting the COVID vaccine, and she has not done so yet. She says she is slowly working her way up to it.      Depression/anxiety/PTSD: Patient is currently taking sertraline 150 mg daily. She has felt like she has more energy now. She has been starting to walk more and be more active. She has had fewer panic attacks recently. Has not needed lorazepam at all recently. She is still  seeing her psychologist twice per week and is seeing her new psychiatrist again at the end of the month.     Insomnia/MAIN: Currently taking rozerem 8 mg (a couple times a month) and hydroxyzine 25 mg as needed (a few times a week). Patient recently had a sleep study and was seen by sleep medicine provider Bennett Goltz, PA-C. She was given a CPAP machine but she hasnt started using it yet.     Obesity: Patient has met with Laila Vidal PA-C, at the weight loss clinic. She has (almost) decided to go forward with bariatric surgery. She is wondering if any of her medications could be contributing to weight gain.     Supplements: She has started taking her iron supplements daily, vitamin D 50,000 units weekly, and has been taking her calcium on and off. She has only needed docusate a couple of times for iron-related constipation. She has no concerns with this today.      Lab Results   Component Value Date    VITDT 8 (L) 03/30/2021     Today's Vitals: BP (!) 130/90   Pulse 75   Wt (!) 336 lb 14.4 oz (152.8 kg)   LMP 04/04/2019   BMI 49.04 kg/m    ----------------    I spent 30 minutes with this patient today. All changes were made via collaborative practice agreement with Dr. Phillips. A copy of the visit note was provided to the patient's primary care provider.    The patient was given a summary of these recommendations.     Domitila Johnson PharmD  [facility] Resident    The patient was seen independently by Dr. Johnson.  I have read the note and agree with the assessment and plan.  Gem Moran, [place], [facility]  Medication Therapy Management Provider  Pager: [phone]          Medication Therapy Recommendations  Takes dietary supplements    Current Medication: calcium carbonate-vitamin D (CALCIUM 600/VITAMIN D) 600-400 MG-UNIT chewable tablet   Rationale: Patient prefers not to take - Adherence - Adherence   Recommendation: Provide Adherence Intervention   Status: Patient Agreed - Adherence/Education

## 2021-05-12 NOTE — PATIENT INSTRUCTIONS
Recommendations from today's MTM visit:                                                    MTM (medication therapy management) is a service provided by a clinical pharmacist designed to help you get the most of out of your medicines.      1. Talk with Dr. Phillips about your migraines.    2. Start using your CPAP machine.     3. Start taking your calcium and vitamin D twice daily.     Follow-up: Return in about 3 months (around 8/12/2021).    It was great to speak with you today.  I value your experience and would be very thankful for your time with providing feedback on our clinic survey. You may receive a survey via email or text message in the next few days.     To schedule another MTM appointment, please call the clinic directly or you may call the MTM scheduling line at [phone] or toll-free at [phone].     My Clinical Pharmacist's contact information:                                                      Please feel free to contact me with any questions or concerns you have.      Domitila Johnson, [place]  [facility] Resident  Pager: [phone]

## 2021-05-12 NOTE — Clinical Note
FYI - she is planning to come see you for evaluation of her migraines. She has been getting them more frequently lately (every 8-12 weeks).     Domitila Johnson, PharmD  MTM Pharmacy Resident

## 2021-05-14 ENCOUNTER — NURSE TRIAGE (OUTPATIENT)
Dept: NURSING | Facility: CLINIC | Age: 51
End: 2021-05-14

## 2021-05-14 ENCOUNTER — IMMUNIZATION (OUTPATIENT)
Dept: NURSING | Facility: CLINIC | Age: 51
End: 2021-05-14
Payer: COMMERCIAL

## 2021-05-14 VITALS — OXYGEN SATURATION: 96 % | HEART RATE: 76 BPM

## 2021-05-14 PROCEDURE — 91300 PR COVID VAC PFIZER DIL RECON 30 MCG/0.3 ML IM: CPT

## 2021-05-14 PROCEDURE — 0001A PR COVID VAC PFIZER DIL RECON 30 MCG/0.3 ML IM: CPT

## 2021-05-14 NOTE — TELEPHONE ENCOUNTER
"Pt received 1st dose of the pfizer vaccine, during 15 mins observation, pt stated to staff observer sx of dizziness and light headedness. Staff observer offered pt animal crackers and apple juice. RN called to assess pt. Pt's vital's were within normal range with no concerns. RN educated pt on sx, and advised pt to seek medical attention if sx become severe or concerning. Pt understood, and states \"feeling better\" pt was discharged after 30 mins. Pt was given appropriate (what to expect after vaccine form) prior to discharge.     SUSAN Back   "

## 2021-05-21 ENCOUNTER — TELEPHONE (OUTPATIENT)
Dept: SLEEP MEDICINE | Facility: CLINIC | Age: 51
End: 2021-05-21

## 2021-05-21 NOTE — TELEPHONE ENCOUNTER
Called patient to schedule CPAP setup and notify of approved Prior Authorization through patient's insurance. No answer. Left voicemail both times for pt to call back @ [phone].

## 2021-06-08 ENCOUNTER — IMMUNIZATION (OUTPATIENT)
Dept: NURSING | Facility: CLINIC | Age: 51
End: 2021-06-08
Attending: INTERNAL MEDICINE
Payer: COMMERCIAL

## 2021-06-08 PROCEDURE — 91300 PR COVID VAC PFIZER DIL RECON 30 MCG/0.3 ML IM: CPT

## 2021-06-08 PROCEDURE — 0002A PR COVID VAC PFIZER DIL RECON 30 MCG/0.3 ML IM: CPT

## 2021-08-13 DIAGNOSIS — E78.5 HYPERLIPIDEMIA LDL GOAL <100: ICD-10-CM

## 2021-08-13 DIAGNOSIS — I10 ESSENTIAL HYPERTENSION: ICD-10-CM

## 2021-08-13 RX ORDER — ATORVASTATIN CALCIUM 20 MG/1
20 TABLET, FILM COATED ORAL DAILY
Qty: 90 TABLET | Refills: 0 | Status: SHIPPED | OUTPATIENT
Start: 2021-08-13 | End: 2021-09-10

## 2021-08-13 RX ORDER — PROPRANOLOL HYDROCHLORIDE 80 MG/1
80 CAPSULE, EXTENDED RELEASE ORAL DAILY
Qty: 90 CAPSULE | Refills: 0 | Status: SHIPPED | OUTPATIENT
Start: 2021-08-13 | End: 2021-09-10

## 2021-08-13 NOTE — TELEPHONE ENCOUNTER
"Pt called requesting Rxs ASAP     Approved x1, scheduled future visit     Does not have BP cuff at home - last 5 weeks her brother has not been around and he owns the cuff         Requested Prescriptions   Pending Prescriptions Disp Refills     atorvastatin (LIPITOR) 20 MG tablet 30 tablet 0     Sig: Take 1 tablet (20 mg) by mouth daily - office visit due (in person, fasting)       Statins Protocol Passed - 8/13/2021  1:58 PM        Passed - LDL on file in past 12 months     Recent Labs   Lab Test 03/30/21  0855   *             Passed - No abnormal creatine kinase in past 12 months     Recent Labs   Lab Test 03/30/21  0849                   Passed - Recent (12 mo) or future (30 days) visit within the authorizing provider's specialty     Patient has had an office visit with the authorizing provider or a provider within the authorizing providers department within the previous 12 mos or has a future within next 30 days. See \"Patient Info\" tab in inbasket, or \"Choose Columns\" in Meds & Orders section of the refill encounter.              Passed - Medication is active on med list        Passed - Patient is age 18 or older        Passed - No active pregnancy on record        Passed - No positive pregnancy test in past 12 months           propranolol ER (INDERAL LA) 80 MG 24 hr capsule 30 capsule 0     Sig: Take 1 capsule (80 mg) by mouth daily - office visit due (in person, fasting)       Beta-Blockers Protocol Failed - 8/13/2021  1:58 PM        Failed - Blood pressure under 140/90 in past 12 months     BP Readings from Last 3 Encounters:   05/12/21 (!) 130/90   03/31/21 (!) 130/93   11/20/20 (!) 142/91                 Passed - Patient is age 6 or older        Passed - Recent (12 mo) or future (30 days) visit within the authorizing provider's specialty     Patient has had an office visit with the authorizing provider or a provider within the authorizing providers department within the previous 12 mos or has " "a future within next 30 days. See \"Patient Info\" tab in inbasket, or \"Choose Columns\" in Meds & Orders section of the refill encounter.              Passed - Medication is active on med list             "

## 2021-08-13 NOTE — TELEPHONE ENCOUNTER
LOV 5- MT, 3- Jacqueline: follow up due for blood pressure and repeat fasting labs  No future OV scheduled    Pharmacy in [facility] requesting refills    30 days pended  SIG/Pharm note given  MyChart sent to patient    RT Yanira (R)

## 2021-08-30 ENCOUNTER — VIRTUAL VISIT (OUTPATIENT)
Dept: PHARMACY | Facility: CLINIC | Age: 51
End: 2021-08-30
Payer: COMMERCIAL

## 2021-08-30 ENCOUNTER — OFFICE VISIT (OUTPATIENT)
Dept: FAMILY MEDICINE | Facility: CLINIC | Age: 51
End: 2021-08-30
Payer: COMMERCIAL

## 2021-08-30 VITALS
HEIGHT: 70 IN | DIASTOLIC BLOOD PRESSURE: 77 MMHG | HEART RATE: 64 BPM | TEMPERATURE: 97.5 F | BODY MASS INDEX: 41.95 KG/M2 | RESPIRATION RATE: 16 BRPM | OXYGEN SATURATION: 99 % | WEIGHT: 293 LBS | SYSTOLIC BLOOD PRESSURE: 105 MMHG

## 2021-08-30 DIAGNOSIS — F43.10 PTSD (POST-TRAUMATIC STRESS DISORDER): ICD-10-CM

## 2021-08-30 DIAGNOSIS — Z78.9 TAKES DIETARY SUPPLEMENTS: ICD-10-CM

## 2021-08-30 DIAGNOSIS — D50.9 IRON DEFICIENCY ANEMIA, UNSPECIFIED IRON DEFICIENCY ANEMIA TYPE: ICD-10-CM

## 2021-08-30 DIAGNOSIS — F41.1 GAD (GENERALIZED ANXIETY DISORDER): ICD-10-CM

## 2021-08-30 DIAGNOSIS — F33.2 SEVERE EPISODE OF RECURRENT MAJOR DEPRESSIVE DISORDER, WITHOUT PSYCHOTIC FEATURES (H): ICD-10-CM

## 2021-08-30 DIAGNOSIS — R60.9 EDEMA, UNSPECIFIED TYPE: Primary | ICD-10-CM

## 2021-08-30 DIAGNOSIS — E78.5 HYPERLIPIDEMIA LDL GOAL <100: ICD-10-CM

## 2021-08-30 DIAGNOSIS — R00.2 PALPITATIONS: ICD-10-CM

## 2021-08-30 DIAGNOSIS — G47.33 OSA (OBSTRUCTIVE SLEEP APNEA): ICD-10-CM

## 2021-08-30 DIAGNOSIS — E66.01 MORBID OBESITY WITH BMI OF 45.0-49.9, ADULT (H): ICD-10-CM

## 2021-08-30 DIAGNOSIS — G47.00 INSOMNIA, UNSPECIFIED TYPE: ICD-10-CM

## 2021-08-30 DIAGNOSIS — Z13.220 LIPID SCREENING: ICD-10-CM

## 2021-08-30 DIAGNOSIS — R06.09 DYSPNEA ON EXERTION: ICD-10-CM

## 2021-08-30 DIAGNOSIS — Z12.11 COLON CANCER SCREENING: ICD-10-CM

## 2021-08-30 DIAGNOSIS — Z13.1 SCREENING FOR DIABETES MELLITUS: ICD-10-CM

## 2021-08-30 DIAGNOSIS — R60.9 EDEMA, UNSPECIFIED TYPE: ICD-10-CM

## 2021-08-30 DIAGNOSIS — R53.83 FATIGUE, UNSPECIFIED TYPE: ICD-10-CM

## 2021-08-30 DIAGNOSIS — R07.89 ATYPICAL CHEST PAIN: ICD-10-CM

## 2021-08-30 DIAGNOSIS — I10 ESSENTIAL HYPERTENSION: ICD-10-CM

## 2021-08-30 DIAGNOSIS — E55.9 VITAMIN D DEFICIENCY: Primary | ICD-10-CM

## 2021-08-30 DIAGNOSIS — R06.02 SOB (SHORTNESS OF BREATH): ICD-10-CM

## 2021-08-30 PROCEDURE — 82728 ASSAY OF FERRITIN: CPT | Performed by: INTERNAL MEDICINE

## 2021-08-30 PROCEDURE — 80048 BASIC METABOLIC PNL TOTAL CA: CPT | Performed by: INTERNAL MEDICINE

## 2021-08-30 PROCEDURE — 84460 ALANINE AMINO (ALT) (SGPT): CPT | Performed by: INTERNAL MEDICINE

## 2021-08-30 PROCEDURE — 99215 OFFICE O/P EST HI 40 MIN: CPT | Performed by: INTERNAL MEDICINE

## 2021-08-30 PROCEDURE — 93000 ELECTROCARDIOGRAM COMPLETE: CPT | Performed by: INTERNAL MEDICINE

## 2021-08-30 PROCEDURE — 82306 VITAMIN D 25 HYDROXY: CPT | Performed by: INTERNAL MEDICINE

## 2021-08-30 PROCEDURE — 83550 IRON BINDING TEST: CPT | Performed by: INTERNAL MEDICINE

## 2021-08-30 PROCEDURE — 99607 MTMS BY PHARM ADDL 15 MIN: CPT | Performed by: PHARMACIST

## 2021-08-30 PROCEDURE — 80061 LIPID PANEL: CPT | Performed by: INTERNAL MEDICINE

## 2021-08-30 PROCEDURE — 99606 MTMS BY PHARM EST 15 MIN: CPT | Performed by: PHARMACIST

## 2021-08-30 PROCEDURE — 36415 COLL VENOUS BLD VENIPUNCTURE: CPT | Performed by: INTERNAL MEDICINE

## 2021-08-30 ASSESSMENT — MIFFLIN-ST. JEOR: SCORE: 2207.32

## 2021-08-30 ASSESSMENT — PATIENT HEALTH QUESTIONNAIRE - PHQ9: SUM OF ALL RESPONSES TO PHQ QUESTIONS 1-9: 7

## 2021-08-30 NOTE — PATIENT INSTRUCTIONS
Recommendations from today's MTM visit:                                                    MTM (medication therapy management) is a service provided by a clinical pharmacist designed to help you get the most of out of your medicines.   Today we reviewed what your medicines are for, how to know if they are working, that your medicines are safe and how to make your medicine regimen as easy as possible.      1.  Please come to see Dr. Phillips at 3pm today.     2.  We will check labs after your visit    Follow-up: Return today (on 8/30/2021) for evaluation of shortness of breath and swelling with Dr. Phillips at 3pm.    It was great to speak with you today.  I value your experience and would be very thankful for your time with providing feedback on our clinic survey. You may receive a survey via email or text message in the next few days.     To schedule another MTM appointment, please call the clinic directly or you may call the MTM scheduling line at [phone] or toll-free at [phone].     My Clinical Pharmacist's contact information:                                                      Please feel free to contact me with any questions or concerns you have.      Gem Moran, PharmD, [facility]  Medication Therapy Management Provider  Pager: [phone]     Sofia Thompson, [place]  Medication Therapy Management Resident  Pager: [phone]

## 2021-08-30 NOTE — PROGRESS NOTES
Assessment & Plan   Problem List Items Addressed This Visit        Respiratory    MAIN (obstructive sleep apnea)       Digestive    Morbid obesity with BMI of 45.0-49.9, adult (H)       Circulatory    Essential hypertension       Hematologic    Anemia, iron deficiency       Behavioral    PTSD (post-traumatic stress disorder)       Other    Insomnia, unspecified type      Other Visit Diagnoses     Vitamin D deficiency    -  Primary    Screening for diabetes mellitus        Fatigue, unspecified type        Lipid screening        Hyperlipidemia LDL goal <100        Takes dietary supplements        Edema, unspecified type        Relevant Orders    Echocardiogram Complete    Atypical chest pain        Colon cancer screening        Palpitations        Relevant Orders    Leadless EKG Monitor 3 to 7 Days (Completed)    Echocardiogram Complete    Dyspnea on exertion        Relevant Orders    Echocardiogram Complete    Echocardiogram Exercise Stress    CBC with platelets         Patient has multiple complaints including leg swelling, shortness of breath, dyspnea on exertion and fatigue.  We will recheck labs today.  We will check a Holter monitor for 3 days and do an echo and stress echo; she had an echo scheduled in the past; she had not done.  She was unable to because she recently came back home from [facility].  She had low vitamin D at some point, recheck level, check TSH.  Encourage increase exercise activities and weight loss as tolerated.  She does occasionally get this chest pain sensation.  She does occasionally get palpitations.  She has not been taking her statin, she takes her lorazepam only as needed in addition to Rozerem as needed, she was not aware of risk of interaction and with CNS depressant meds..  She takes hydroxyzine that helps her to initiate sleep; the rozerem helps her to maintain sleep when she takes both.  She is maintained on propranolol 80 mg seems to have helped with her headaches, she thought  "was for her blood pressure, she had labile blood pressure readings in the past, some of them were normal some of them were in the 130s -140s over 100.  Denies any dizziness or lightheaded or headedness from such medication.  Continue on such for now as she is well tolerating.  She is maintained on Zoloft 150 mg daily.  She does not need refills of medications today.  Risk of polypharmacy, will need continue follow up with MTM.   Follow up with sleep medicine  life style changes recommended,  seeing wt management program;has met with Laila Vidal PA-C, at the weight loss clinic. She was given \"task list\" and is working through this.          BMI:   Estimated body mass index is 48.62 kg/m  as calculated from the following:    Height as of this encounter: 1.765 m (5' 9.5\").    Weight as of this encounter: 151.5 kg (334 lb).   Weight management plan: Discussed healthy diet and exercise guidelines    CONSULTATION/REFERRAL to   Work on weight loss  Regular exercise  See Patient Instructions    Return in about 2 months (around 10/30/2021), or if symptoms worsen or fail to improve, for As needed and if symptoms worsen, Lab Work, Physical Exam, anxiety, other.      Total time spent was 40 minutes review of records and recommendations and exam.  Megan Phillips MD  [facility] ELA Nixon is a 50 year old who presents for the following health issues     HPI     Traveled, to [facility],     SOB had PCR neg x 2    Leg swelling, elevate leg still swelling    Feels skipped heart beats    Has done Intermittent fasting, and cut down on sugars    Sleep study, positive study.,     Not taking psych meds,     Before had anxiety and stress,     Hydroxyzine and rozerem, not has to refill prescriptions    Socials,     Not using recreational drugs    THC EDIBLES, WAS USING IN [place]    ZOLOFT DAILY 1.5 DAILY    PROPANOLOL 80           Review of Systems   Constitutional, HEENT, cardiovascular, pulmonary, " "GI, , musculoskeletal, neuro, skin, endocrine and psych systems are negative, except as otherwise noted.      Objective    /77 (BP Location: Left arm, Patient Position: Chair, Cuff Size: Adult Large)   Pulse 64   Temp 97.5  F (36.4  C) (Temporal)   Resp 16   Ht 1.765 m (5' 9.5\")   Wt (!) 151.5 kg (334 lb)   LMP 04/04/2019   SpO2 99%   BMI 48.62 kg/m    Body mass index is 48.62 kg/m .  Physical Exam   GENERAL: healthy, alert and no distress  EYES: Eyes grossly normal to inspection, PERRL and conjunctivae and sclerae normal  NECK: no adenopathy, no asymmetry, masses, or scars and thyroid normal to palpation  RESP: lungs clear to auscultation - no rales, rhonchi or wheezes  CV: regular rate and rhythm, normal S1 S2, no S3 or S4, no murmur, click or rub, positive pedal edema and peripheral pulses strong  ABDOMEN: soft, nontender, no hepatosplenomegaly, no masses and bowel sounds normal  MS: no gross musculoskeletal defects noted, no edema  SKIN: no suspicious lesions or rashes  NEURO: Normal strength and tone, mentation intact and speech normal  PSYCH: mentation appears normal, affect normal/bright    Orders Only on 03/30/2021   Component Date Value Ref Range Status     Troponin I ES 03/30/2021 <0.015  0.000 - 0.045 ug/L Final    Comment: The 99th percentile for upper reference range is 0.045 ug/L.  Troponin values   in the range of 0.045 - 0.120 ug/L may be associated with risks of adverse   clinical events.       D Dimer 03/30/2021 0.5  0.0 - 0.50 ug/ml FEU Final    Comment: This D-dimer assay is intended for use in conjunction with a clinical pretest   probability assessment model to exclude pulmonary embolism (PE) and deep   venous thrombosis (DVT) in outpatients suspected of PE or DVT. The cut-off   value is 0.5 ug/mL FEU.       CK Total 03/30/2021 190  30 - 225 U/L Final     Creatinine Urine 03/30/2021 240  mg/dL Final     Albumin Urine mg/L 03/30/2021 20  mg/L Final     Albumin Urine mg/g Cr " 03/30/2021 8.38  0 - 25 mg/g Cr Final     TSH 03/30/2021 1.39  0.40 - 4.00 mU/L Final     Iron 03/30/2021 67  35 - 180 ug/dL Final     Iron Binding Cap 03/30/2021 285  240 - 430 ug/dL Final     Iron Saturation Index 03/30/2021 24  15 - 46 % Final     Ferritin 03/30/2021 24  8 - 252 ng/mL Final     WBC 03/30/2021 5.9  4.0 - 11.0 10e9/L Final     RBC Count 03/30/2021 4.25  3.8 - 5.2 10e12/L Final     Hemoglobin 03/30/2021 13.2  11.7 - 15.7 g/dL Final     Hematocrit 03/30/2021 41.4  35.0 - 47.0 % Final     MCV 03/30/2021 97  78 - 100 fl Final     MCH 03/30/2021 31.1  26.5 - 33.0 pg Final     MCHC 03/30/2021 31.9  31.5 - 36.5 g/dL Final     RDW 03/30/2021 14.8  10.0 - 15.0 % Final     Platelet Count 03/30/2021 248  150 - 450 10e9/L Final     Hemoglobin A1C 03/30/2021 5.4  0 - 5.6 % Final    Comment: Normal <5.7% Prediabetes 5.7-6.4%  Diabetes 6.5% or higher - adopted from ADA   consensus guidelines.       Sodium 03/30/2021 139  133 - 144 mmol/L Final     Potassium 03/30/2021 4.1  3.4 - 5.3 mmol/L Final     Chloride 03/30/2021 109  94 - 109 mmol/L Final     Carbon Dioxide 03/30/2021 26  20 - 32 mmol/L Final     Anion Gap 03/30/2021 4  3 - 14 mmol/L Final     Glucose 03/30/2021 106* 70 - 99 mg/dL Final    Fasting specimen     Urea Nitrogen 03/30/2021 13  7 - 30 mg/dL Final     Creatinine 03/30/2021 0.60  0.52 - 1.04 mg/dL Final     GFR Estimate 03/30/2021 >90  >60 mL/min/[1.73_m2] Final    Comment: Non  GFR Calc  Starting 12/18/2018, serum creatinine based estimated GFR (eGFR) will be   calculated using the Chronic Kidney Disease Epidemiology Collaboration   (CKD-EPI) equation.       GFR Estimate If Black 03/30/2021 >90  >60 mL/min/[1.73_m2] Final    Comment:  GFR Calc  Starting 12/18/2018, serum creatinine based estimated GFR (eGFR) will be   calculated using the Chronic Kidney Disease Epidemiology Collaboration   (CKD-EPI) equation.       Calcium 03/30/2021 8.7  8.5 - 10.1 mg/dL Final      Bilirubin Total 03/30/2021 0.4  0.2 - 1.3 mg/dL Final     Albumin 03/30/2021 3.3* 3.4 - 5.0 g/dL Final     Protein Total 03/30/2021 7.3  6.8 - 8.8 g/dL Final     Alkaline Phosphatase 03/30/2021 75  40 - 150 U/L Final     ALT 03/30/2021 35  0 - 50 U/L Final     AST 03/30/2021 21  0 - 45 U/L Final     Cholesterol 03/30/2021 233* <200 mg/dL Final    Desirable:       <200 mg/dl     Triglycerides 03/30/2021 118  <150 mg/dL Final    Fasting specimen     HDL Cholesterol 03/30/2021 64  >49 mg/dL Final     LDL Cholesterol Calculated 03/30/2021 145* <100 mg/dL Final    Comment: Above desirable:  100-129 mg/dl  Borderline High:  130-159 mg/dL  High:             160-189 mg/dL  Very high:       >189 mg/dl       Non HDL Cholesterol 03/30/2021 169* <130 mg/dL Final    Comment: Above Desirable:  130-159 mg/dl  Borderline high:  160-189 mg/dl  High:             190-219 mg/dl  Very high:       >219 mg/dl       Vitamin D Deficiency screening 03/30/2021 8* 20 - 75 ug/L Final    Comment: Season, race, dietary intake, and treatment affect the concentration of   25-hydroxy-Vitamin D. Values may decrease during winter months and increase   during summer months. Values 20-29 ug/L may indicate Vitamin D insufficiency   and values <20 ug/L may indicate Vitamin D deficiency.  Vitamin D determination is routinely performed by an immunoassay specific for   25 hydroxyvitamin D3.  If an individual is on vitamin D2 (ergocalciferol)   supplementation, please specify 25 OH vitamin D2 and D3 level determination by   LCMSMS test [id number].

## 2021-08-30 NOTE — Clinical Note
FYI - patient will be seeing you at 3pm today.  She's had bilateral edema and shortness of breath since 7/13, when she traveled on a 13 hour flight.  I did put some lab orders in for her to have done after your visit, please add as you see fit.  Thanks!  Gem Moran, PharmD, [facility]  Medication Therapy Management Provider  Pager: [phone]

## 2021-08-30 NOTE — PROGRESS NOTES
Medication Therapy Management (MTM) Encounter    ASSESSMENT:                            Medication Adherence/Access: No issues identified    Edema/SOB:  Needs further evaluation.  May benefit from checking BMP also.    Hypertension: Patient is not meeting blood pressure goal of < 130/80mmHg, this can be re-checked when edema/SOB is evaluated further.     Hyperlipidemia: Due for fasting lipid panel.  Her ASCVD risk has been borderline for statin therapy being indicated, will re-assess after receiving updated lipids.      Depression/anxiety/PTSD: Improving. Plan in place to follow up with psychiatry.     Insomnia/MAIN: Stable, need to clarify CPAP use.    Obesity: Plan in place to follow up with weight loss clinic to work towards bariatric surgery.     Supplements:  Due for repeat iron studies as well as Vitamin D screening.  If Vitamin D levels have been restored to normal levels, she can transition to daily maintenance dose.    PLAN:                            1.  Appointment scheduled for her to see Dr. Phillips at 3pm today.  2.  Future orders placed for lipid panel, BMP, ferritin, iron studies, and Vitamin D.  She will have these done after seeing Dr. Phillips in case additional tests are ordered.    Follow-up: Return today (on 8/30/2021) for evaluation of shortness of breath and swelling with Dr. Phillips at 3pm.    SUBJECTIVE/OBJECTIVE:                          Tiffani Brasher is a 50 year old female contacted via secure video for a follow-up visit. She was referred to me from Dr. Phillips.  Today's visit is a follow-up MTM visit from 5/12/2021.     Reason for visit: Routine f/up.  Patient expresses concern about ongoing edema.    Tobacco: She reports that she has never smoked. She has never used smokeless tobacco.  Alcohol: 2-3 bottles of wine per month    Medication Adherence/Access: no issues reported    Edema/SOB:  Tiffani reports she's had bilateral edema for about a month.  She had a long flight (13 hours) on 7/13  "and symptoms started at that time.  She reports she's drinking lots of fluids, she does not eat much salt.  She's been trying to elevate her legs - symptoms improve after doing so, but do not resolve completely.  She does have pitting edema during our visit today.  She reports shortness of breath as well, originally started when traveling.    Hypertension: Current medications include propranolol ER 80 mg. Patient reports no current medication side effects. At the time when this was started (June 2020), she had also been having migraines so the intent was for this to help with both issues.    BP Readings from Last 3 Encounters:   05/12/21 (!) 130/90   03/31/21 (!) 130/93   11/20/20 (!) 142/91      Hyperlipidemia: Current therapy includes none - she stopped atorvastatin after a few days because she was having shoulder myalgias.  She's concerned about being on a statin as she's heard she may need to be on this for the rest of her life.  The 10-year ASCVD risk score (Milnesandkathy OROZCO Jr., et al., 2013) is: 1.8%    Values used to calculate the score:      Age: 50 years      Sex: Female      Is Non- : No      Diabetic: No      Tobacco smoker: No      Systolic Blood Pressure: 130 mmHg      Is BP treated: Yes      HDL Cholesterol: 64 mg/dL      Total Cholesterol: 233 mg/dL     Recent Labs   Lab Test 03/30/21  0855 04/11/19  1252   CHOL 233* 193   HDL 64 58   * 110*   TRIG 118 110      Depression/anxiety/PTSD: Patient is currently taking sertraline 150 mg daily.  She reports symptoms have been stable.  She's nervous about the edema.  She continues seeing her therapist regularly.  She has lorazepam available for use, but has not needed.    Insomnia/MAIN: Currently taking rozerem 8 mg (rarely) and hydroxyzine 25 mg as needed (a few times a week).  Unclear if patient has started using CPAP machine yet.    Obesity: Patient has met with Laila Vidal PA-C, at the weight loss clinic. She was given \"task " "list\" and is working through this.    Supplements:  Tiffani has been taking a liquid iron product from [employer], which she feels she tolerates better than the ferrous sulfate tablets, and a daily multivitamin.  She has not been taking Vitamin D or calcium/vitamin D (unclear how long she's been off).  She has no concerns with this today.    Vitamin D Deficiency Screening Results:  Lab Results   Component Value Date    VITDT 8 (L) 03/30/2021      Today's Vitals: LMP 04/04/2019   ----------------    I spent 24 minutes with this patient today. All changes were made via collaborative practice agreement with Dr. Phillips. A copy of the visit note was provided to the patient's primary care provider.    The patient was sent via [employer] a summary of these recommendations.     Gem Moran, PharmD, [facility]  Medication Therapy Management Provider  Pager: [phone]     Telemedicine Visit Details  Type of service:  Video Conference via Fio  Start Time: 11:44 AM  End Time: 12:08 PM  Originating Location (patient location): [place]  Distant Location (provider location):  [facility]     Medication Therapy Recommendations  Essential hypertension    Rationale: Synergistic therapy - Needs additional medication therapy - Indication   Recommendation: Continue to Monitor - Baseline labs and in-clinic blood pressure check. Would start lisinopril 5 mg based on BP and labs.   Status: No Longer Relevant         Hyperlipidemia LDL goal <100    Current Medication: atorvastatin (LIPITOR) 20 MG tablet   Rationale: Medication requires monitoring - Needs additional monitoring - Effectiveness   Recommendation: Order Lab   Status: Accepted per CPA                "

## 2021-08-31 ENCOUNTER — TELEPHONE (OUTPATIENT)
Dept: SLEEP MEDICINE | Facility: CLINIC | Age: 51
End: 2021-08-31

## 2021-08-31 LAB
ALT SERPL W P-5'-P-CCNC: 31 U/L (ref 0–50)
ANION GAP SERPL CALCULATED.3IONS-SCNC: 4 MMOL/L (ref 3–14)
BUN SERPL-MCNC: 9 MG/DL (ref 7–30)
CALCIUM SERPL-MCNC: 9.3 MG/DL (ref 8.5–10.1)
CHLORIDE BLD-SCNC: 107 MMOL/L (ref 94–109)
CHOLEST SERPL-MCNC: 280 MG/DL
CO2 SERPL-SCNC: 26 MMOL/L (ref 20–32)
CREAT SERPL-MCNC: 0.61 MG/DL (ref 0.52–1.04)
DEPRECATED CALCIDIOL+CALCIFEROL SERPL-MC: 24 UG/L (ref 20–75)
FASTING STATUS PATIENT QL REPORTED: YES
FERRITIN SERPL-MCNC: 17 NG/ML (ref 8–252)
GFR SERPL CREATININE-BSD FRML MDRD: >90 ML/MIN/1.73M2
GLUCOSE BLD-MCNC: 89 MG/DL (ref 70–99)
HDLC SERPL-MCNC: 69 MG/DL
IRON SATN MFR SERPL: 16 % (ref 15–46)
IRON SERPL-MCNC: 52 UG/DL (ref 35–180)
LDLC SERPL CALC-MCNC: 176 MG/DL
NONHDLC SERPL-MCNC: 211 MG/DL
POTASSIUM BLD-SCNC: 4 MMOL/L (ref 3.4–5.3)
SODIUM SERPL-SCNC: 137 MMOL/L (ref 133–144)
TIBC SERPL-MCNC: 327 UG/DL (ref 240–430)
TRIGL SERPL-MCNC: 177 MG/DL

## 2021-09-10 ENCOUNTER — VIRTUAL VISIT (OUTPATIENT)
Dept: SURGERY | Facility: CLINIC | Age: 51
End: 2021-09-10
Payer: COMMERCIAL

## 2021-09-10 VITALS — WEIGHT: 293 LBS | BODY MASS INDEX: 41.95 KG/M2 | HEIGHT: 70 IN

## 2021-09-10 DIAGNOSIS — E66.01 MORBID OBESITY WITH BMI OF 45.0-49.9, ADULT (H): ICD-10-CM

## 2021-09-10 PROCEDURE — 97803 MED NUTRITION INDIV SUBSEQ: CPT | Mod: 95 | Performed by: DIETITIAN, REGISTERED

## 2021-09-10 ASSESSMENT — MIFFLIN-ST. JEOR: SCORE: 2184.18

## 2021-09-10 NOTE — PROGRESS NOTES
"Tiffani is a 51 year old who is being evaluated via a billable video visit.      How would you like to obtain your AVS? MyChart  If the video visit is dropped, the invitation should be resent by: Text to cell phone: [phone]  Will anyone else be joining your video visit? No      Video Start Time: 1:32pm       Video-Visit Details    Type of service:  Video Visit    Video End Time:1:53pm    Originating Location (pt. Location): Home    Distant Location (provider location):  [facility] [place]     Platform used for Video Visit: [employer])     PRE SURGICAL WEIGHT LOSS NUTRITION APPOINTMENT    Tiffani Brasher  1970  female  [id number]  51 year old    ASSESSMENT    Desired Surgical Procedure: (undecided)    REASON FOR VISIT:  Tiffani Brasher is a 51 year old year old female presents today for a pre-surgical weight loss follow-up appointment. Patient accompanied by self.    DIAGNOSIS:  Weight Status Obesity Grade III BMI >40    ANTHROPOMETRICS:  Height: 69.5\"    Initial Weight: 319 lbs     Weight last visit: 335 lbs    Current weight: 330 lbs 0 oz    BMI: Body mass index is 48.03 kg/m .    VITAMINS AND MINERALS:  1 Multivitamin with Minerals (AM)  600 mg Calcium with Vitamin D (AM)  5000 International units Vitamin D      NUTRITION HISTORY:  Breakfast: [wakes at 9:30-10am, eats at 12:30-1pm] eggs + toast or tortilla  Lunch: [6-8pm] salad  vegetables + rice  Supper: [12:30-1am]  Snacks: none   Fluids Consumed: Water (40-60oz), coffee (16oz cold brew), alcohol (minimal)  Eating slower: No  Chewing foods thoroughly: No  Take 20-30 minutes to consume each meal: No  Fluids and meals separate by at least 30 minutes: No  Carbonation: none  Caffeine: yes  Additional Information: Pt started taking vitamins. Minimal changes to lifestyle. Reviewed necessary lifestyle changes as well as expectations for dietary clearance. Reviewed the importance of attending monthly dietitian visits. Pt with " some misconceptions about timeline to surgery and task list. Discussed extensively today and reminded pt to schedule with provider for completion of surgical education and task list review.     PHYSICAL ACTIVITY:  None    DIAGNOSIS:  Previous Nutrition Diagnosis: Obesity related to long history of self- monitoring deficit and excessive energy intake evidenced by BMI of 48.76 kg/m2  No change, modified below    Previous goals:   Begin taking multivitamin, calcium and vitamin D per guidelines - partially met  Limit wine to 1 bottle per month - met  Eat 3 meals/day - not met    Current Nutrition Diagnosis: Obesity related to long history of self-monitoring deficit and excessive energy intake as evidenced by BMI of 48.03 kg/m2.    INTERVENTION:  Nutrition Prescription: Recommended energy/nutrient modification.    GOALS:  Take Calcium separate from multivitamin by at least 2 hours  Eat 3 meals/day  Take 20-30 minutes to eat each meal      Intervention:  - Discussed progress towards previous goals.  - Reinforced importance of making behavior changes in preparation for bariatric surgery.   - Assessed learning needs and learning preferences       NUTRITION MONITORING AND EVALUATION:  Anticipated compliance: fair  Patient demonstrated fair understanding.     Follow up: Continue to monitor patient closely regarding weight loss and diet.  # of visits needed: 3+  Cleared by RD: Yes     TIME SPENT WITH PATIENT: 21 minutes      Bridgett Torrez RD, LD  Clinical Dietitian

## 2021-09-10 NOTE — PATIENT INSTRUCTIONS
Ervin Nixon!      It was great seeing you again today! Here's a summary of the goals we discussed:  Goals:    1. Increase your calcium to 600mg 2x/day.   - Take this at least 2 hours apart from your multivitamin    2. Eat 3 meals/day  - Have your first meal within 1 hour of waking up  - Then eat meals every 4-6 hours    3. Eat slowly  - Take 20-30 minutes to eat meals      You'll need to schedule another dietitian visit in 1 month, for 30 minutes - this visit can be virtual or in clinic. You also still need to follow up with Laila for 60 minutes - this visit must be in clinic. All visits can be scheduled via our call center at . Below, you will also find the list of psychologists we discussed. You can contact anyone on this list to schedule your bariatric psychological evaluation.    Bridgett Torrez RD, LD  Clinical Dietitian                                                       [facility]                                                                       Psychologist List                                                     Bariatric Psychological Assessments    When you call the psychologist's office,     Please specify you need a  screening psychological assessment for bariatric surgery.      This includes: a bariatric surgery evaluation and MMPI.    The report should be mailed /faxed to our office at [phone].      Psychologists are usually booked several weeks in advance; take this into consideration when you call them.   Please check with your insurance to see that the provider is covered.      [facility] Providers:  [facility] (Sites located throughout [facility])  Renuka, Terri Sánchez, [place], [facility] and [place] [phone]   [facility]    Gaby Cuello, PhD, LP  Karli Connolly, PhD, DIANA Chao PhD (cannot accept Medicare)    [facility]      [address] [place] [address]  [address]  [address], [place] [address]  [address], [place], [place]          [phone]       Yosvany George, PhD, LP [address] [facility] [address] [place] [address]  Appointment scheduling only  All other questions   [phone]       Outside Providers:  [facility]--  Peace Cheng, MS, LPCC, LADC [address] [facility], [address] [place], [place] [address] [phone]   [facility]--  Jo Osei, [place] [address] [facility] [address] [place] [address] [phone]   [facility]  Wolf Valentin, PhD, LP   [email or web address] [address] [facility], [address] [place], [place] [address] [phone]     Bella Umanzor PsyD,   (Medica not in network) [address] [place] [address], [address] [place], [place] [id number] [phone]   Diego Mendieta, PhD, [facility]  [address] [place] [address] [phone]   [facility]--  Chayito Carrillo, PhD, LP [address] [facility] [address] [place] [address] [phone]   Keisha Sutherland PsyD,LP     [address] [place], [place] [id number] [phone]     Wolf Marquez, PhD, LP [address] [place], [place] [address] [phone]   [facility]   Katia Orantes PsyD, ABPP, LP     [address] [facility]  [address]    [address] [place] [address] [address] [place] [address]  &  [place] [address] [phone]      [facility]    Wolf Shane, PhD, LP     Haseeb YooyD, LP  [address] [place], [place] [id number] [phone]     Vanessa Connolly, PhD, LP [address] , [place] [address], [phone]      Nadine Bronson, Kana, LP [address] [place], [place] [id number] [phone]     Batsheva Pete, Kana, LP    [address]  [place]  [id number] [phone]      [facility]  Jany Zavaleta, [place] [address] [place] [id number] [phone]   [place]  Kana Coelho,DIANA,ABPP [address] [place] [address] [phone]     Rocio Yin PsyD, DIANA   [facility], [address] [place] [address] [phone]     Chanelle Freeman PsyD    [address] [place] [address] , [place] [address] [phone]     Eb Ray, Phd [facility] [address] [phone]      Berto Kuhn PsyD, LP [address] [place] [address] [phone]      Glenis Moseley PsyD,  [address] [place] [address] [phone]

## 2021-09-17 ENCOUNTER — VIRTUAL VISIT (OUTPATIENT)
Dept: FAMILY MEDICINE | Facility: CLINIC | Age: 51
End: 2021-09-17
Payer: COMMERCIAL

## 2021-09-17 DIAGNOSIS — Z53.9 ERRONEOUS ENCOUNTER--DISREGARD: Primary | ICD-10-CM

## 2021-09-17 NOTE — PROGRESS NOTES
"Erroneous encounter, [place], could not perform virtual visit. Patient advised to sent us [email or web address] message and we can address any of her questions and concerns . Patient in agreement with plan.      \"Swelling in not completely gone down,     How do I fly without compromising this?    Tripped yesterday over a medicine box , one of this on floor, landed on floor, fell and hit pelvis first than head, could not move at all, took for 20 min for ambulance to get in and put her on stretcher and sent her to ed, xrays of pelvis and head, nothing wrong\" as per patient.                "

## 2021-09-20 DIAGNOSIS — G47.33 OBSTRUCTIVE SLEEP APNEA (ADULT) (PEDIATRIC): Primary | ICD-10-CM

## 2021-10-10 ENCOUNTER — HEALTH MAINTENANCE LETTER (OUTPATIENT)
Age: 51
End: 2021-10-10

## 2021-11-30 ENCOUNTER — TELEPHONE (OUTPATIENT)
Dept: SLEEP MEDICINE | Facility: CLINIC | Age: 51
End: 2021-11-30
Payer: COMMERCIAL

## 2021-12-04 ENCOUNTER — HEALTH MAINTENANCE LETTER (OUTPATIENT)
Age: 51
End: 2021-12-04

## 2021-12-23 ENCOUNTER — TELEPHONE (OUTPATIENT)
Dept: FAMILY MEDICINE | Facility: CLINIC | Age: 51
End: 2021-12-23
Payer: COMMERCIAL

## 2021-12-23 NOTE — TELEPHONE ENCOUNTER
Sent patient [employer] msg to clarify exactly what she needs - she should be able to access med list from [facility].  Will await response.    Gem Moran, PharmD, [facility]  Medication Therapy Management Provider  Pager: [phone]

## 2021-12-23 NOTE — TELEPHONE ENCOUNTER
Gem,  Patient needs a letter listing all of her medications. She will print it from [employer]. Patient thanks you and said Happy Holidays

## 2021-12-28 NOTE — TELEPHONE ENCOUNTER
Letter created, closing encounter.    Paula AraujoD, [facility]  Medication Therapy Management Provider  Pager: [phone]

## 2022-01-06 ENCOUNTER — TELEPHONE (OUTPATIENT)
Dept: SLEEP MEDICINE | Facility: CLINIC | Age: 52
End: 2022-01-06
Payer: COMMERCIAL

## 2022-03-11 NOTE — PROGRESS NOTES
"CHIEF COMPLAINT:  No chief complaint on file.       HISTORY OF PRESENT ILLNESS  Ms. Brasher is a pleasant 51 year old year old female who presents to clinic today with right hip pain and chronic right achilles pain. She states her doctor thinks her pain is stemming from her achilles.     She states she ruptured her achilles in 2019 and never had surgery, has improved overall but lingering weakness persists.  Tiffani explains that her pain started after she fell in September of last year. Fall on 9/17/22 falling forward on a punching bag on the ground.  Landed on anterior hips and facial region.  CT scan of facial/head and xray hips ordered at that time and unremarkable.    Has been feeling as if her hip has been \"dislocating\" and has associated pain. Component of paresthesias down her leg past knee.  Chronic low back discomfort.    Onset: sudden  Location: right hip posterior, anterior and troch  Quality:  Tingling, sharp  Duration: 6 months   Severity: 10/10 at worst  Timing:constant and intermittent episodes sit to stand, certain laying positions, stairs  Modifying factors:  resting and non-use makes it better, movement and use makes it worse  Associated signs & symptoms: numbness, tingling, itching  Previous similar pain: No  Treatments to date: none    Onset: sudden  Location: achilles tendon  Quality:  aching  Duration: 2 years   Severity: 7/10 at worst  Timing:constant with weight bearing and stairs   Modifying factors:  resting and non-use makes it better, movement and use makes it worse  Associated signs & symptoms: limited ROM and stretching  Previous similar pain: No  Treatments to date: compression sock     Lastly she notes pain of her right Achilles tendon.  She was told that she should bring this up at the visit today.  She notes she \"ruptured her tendon\" over a year ago but did not seek medical attention.  She believes it was ruptured based on Internet research.  She has been noticing improvement over " time and still remains weak overall.  She has difficulty standing on her toes as well as bleeding with her right ankle.  She is however feeling steady improvement.      Additional history: as documented    Review of Systems:    Have you recently had a a fever, chills, weight loss? No    Do you have any vision problems? No    Do you have any chest pain or edema? No    Do you have any shortness of breath or wheezing?  No    Do you have stomach problems? No    Do you have any numbness or focal weakness? No    Do you have diabetes? No    Do you have problems with bleeding or clotting? No    Do you have an rashes or other skin lesions? No    MEDICAL HISTORY  Patient Active Problem List   Diagnosis     Anemia, iron deficiency     Morbid obesity with BMI of 45.0-49.9, adult (H)     JAXON (generalized anxiety disorder)     Severe episode of recurrent major depressive disorder, without psychotic features (H)     PTSD (post-traumatic stress disorder)     Insomnia, unspecified type     Essential hypertension     Intertrigo     MAIN (obstructive sleep apnea)       Current Outpatient Medications   Medication Sig Dispense Refill     atorvastatin (LIPITOR) 20 MG tablet Take 1 tablet (20 mg) by mouth daily 90 tablet 0     calcium carbonate-vitamin D (CALCIUM 600/VITAMIN D) 600-400 MG-UNIT chewable tablet Take one twice daily and at least 2 hours apart from iron. 180 tablet 1     hydrOXYzine (ATARAX) 25 MG tablet Take 25 mg by mouth Takes to initiate sleep takes as needed. Has Rozeram that alternates in       LORazepam (ATIVAN) 0.5 MG tablet Take 1 tablet (0.5 mg) by mouth daily as needed for anxiety do not take with Remeron, do not drive, drink alcohol or use fine machinery on this medicine 15 tablet 0     Multiple Vitamins-Iron (QC DAILY MULTIVITAMINS/IRON) TABS Take 1 tablet by mouth daily 90 tablet 1     propranolol ER (INDERAL LA) 80 MG 24 hr capsule Take 1 capsule (80 mg) by mouth daily 90 capsule 1     ROZEREM 8 MG tablet Take 1  tablet (8 mg) by mouth nightly as needed for sleep 30 tablet 0     sertraline (ZOLOFT) 100 MG tablet Take 1.5 tablets (150 mg) by mouth daily 45 tablet 1     UNABLE TO FIND MEDICATION NAME: Liquid iron - dose unknown       vitamin D3 (CHOLECALCIFEROL) 1.25 MG (08533 UT) capsule Take 1 capsule (50,000 Units) by mouth every 7 days 7 capsule 0       No Known Allergies    Family History   Problem Relation Age of Onset     Hypertension Mother      Sleep Apnea Sister      Obesity Sister        Additional medical/Social/Surgical histories reviewed in [facility] and updated as appropriate.       PHYSICAL EXAM  LMP 04/04/2019     General  - normal appearance, in no obvious distress,ectomorphic habitus  Musculoskeletal - lumbar spine  - stance: slow to rise and sit  - inspection: normal bone and joint alignment, no obvious scoliosis  - palpation: bilateral lumbar paravertebral spasm, tenderness at lumbosacral region near L5 bilaterally  - ROM: pain with left rotation, flexion past 45 deg,   - strength: lower extremities 5/5 in all planes  - special tests:  (-) straight leg raise bilaterally  (-) slump test  Musculoskeletal -right hip  - stance: normal gait without limp  - inspection: no swelling or effusion,  normal bone and joint alignment, no obvious deformity  - palpation: no lateral or anterior hip tenderness  - ROM:  Pain and limited flexion past 90, pain and limited internal rotation. ER full and painless  - strength: 5/5 in all planes except hip flexion 4/5 with pain  - special tests:  (-) BENJA  (-) FADIR  no pain with axial femoral load  Musculoskeletal - foot / ankle  - stance: normal gait without limp, difficulty performing heel raise on right  - inspection: No inspection, no visible deformity  - palpation: tender over mid substance Achilles tendon- ROM:Full ankle ROM in all planes  - strength: 5/5 in all planes  Neuro  - no sensory or motor deficit, grossly normal coordination, normal muscle tone  Skin  - no ecchymosis,  erythema, warmth, or induration, no obvious rash  Psych  - interactive, appropriate, normal mood and affect    IMAGING : XR lumbar and pelvis with right hip. Final results and radiologist's interpretation, available in the [facility] health record. Images were reviewed with the patient/family members in the office today. My personal interpretation of the performed imaging is mild bilateral acetabular spurring and mild DJD of hips.  L5-S1 moderate disc space loss with spurring.     ASSESSMENT & PLAN  Ms. Brasher is a 51 year old year old female who presents to clinic today with right anterolateral hip pain since significant fall on 9/17/21 in which CT performed for associated head trauma.  Persisting pain over the past 6 months with sensation of hip dislocation.  Suspected possible hip osteoarthritis, hip flexor injury vs. Other.  Additionally I suspect lumbar radiculitis playing a role in radicular symptoms in association with L5-S1 DDD.    Diagnosis:   1. Lumbar degenerative disc disease  2. Chronic pain of right hip.  3. Primary osteoarthritis of right hip, mild  4. Pain of  Right achilles tendon    Treatment options discussed and at this time we will gain more information with MRI of her right hip.  This is necessary given significant duration greater than 6 months from a traumatic injury.  Also provided in detail about articular surfaces, as well as her hip flexor musculature-there has been significant hip flexor weakness on exam today.  We discussed consideration for formal physical therapy as one avenue versus MRI, but we both settled on MRI imaging.    Does seem her lumbar radiculitis is playing a role but this is only intermittent and not significantly debilitating for her like her hip.  We will consider formal physical therapy if she will be going for her hip.  We will discuss this further after MRI of her hip.  May consider MRI in the future if increasingly bothersome    Lastly she does seem to suffer from  Achilles tendinitis.  Although she mentions rupture, this was not confirmed with imaging and she has no defect  today.  Possibly partial tear versus tendinitis.  We have she would do well and continue respond favorably to formal physical therapy program for home exercises.  I will provide home exercises for her to begin and it is encouraging to hear that this is been improving steadily.    It was a pleasure seeing Tiffani today.    55 minutes on date of the encounter doing chart review, history and examination, independent imaging review, documentation, and additional activities noted above.    Arden Dee DO, CAQSM  Primary Care Sports Medicine

## 2022-03-16 ENCOUNTER — LAB (OUTPATIENT)
Dept: LAB | Facility: CLINIC | Age: 52
End: 2022-03-16
Payer: COMMERCIAL

## 2022-03-16 ENCOUNTER — IMMUNIZATION (OUTPATIENT)
Dept: NURSING | Facility: CLINIC | Age: 52
End: 2022-03-16

## 2022-03-16 ENCOUNTER — HOSPITAL ENCOUNTER (OUTPATIENT)
Dept: CARDIOLOGY | Facility: CLINIC | Age: 52
Discharge: HOME | End: 2022-03-16
Attending: INTERNAL MEDICINE | Admitting: INTERNAL MEDICINE
Payer: COMMERCIAL

## 2022-03-16 DIAGNOSIS — R06.09 DYSPNEA ON EXERTION: ICD-10-CM

## 2022-03-16 DIAGNOSIS — R60.9 EDEMA, UNSPECIFIED TYPE: ICD-10-CM

## 2022-03-16 DIAGNOSIS — R00.2 PALPITATIONS: ICD-10-CM

## 2022-03-16 DIAGNOSIS — E78.5 HYPERLIPIDEMIA LDL GOAL <100: ICD-10-CM

## 2022-03-16 LAB
AST SERPL W P-5'-P-CCNC: 12 U/L (ref 0–45)
ERYTHROCYTE [DISTWIDTH] IN BLOOD BY AUTOMATED COUNT: 14.2 % (ref 10–15)
HCT VFR BLD AUTO: 41.7 % (ref 35–47)
HGB BLD-MCNC: 13.1 G/DL (ref 11.7–15.7)
LVEF ECHO: NORMAL
LVEF ECHO: NORMAL
MCH RBC QN AUTO: 29.9 PG (ref 26.5–33)
MCHC RBC AUTO-ENTMCNC: 31.4 G/DL (ref 31.5–36.5)
MCV RBC AUTO: 95 FL (ref 78–100)
PLATELET # BLD AUTO: 213 10E3/UL (ref 150–450)
RBC # BLD AUTO: 4.38 10E6/UL (ref 3.8–5.2)
WBC # BLD AUTO: 7.2 10E3/UL (ref 4–11)

## 2022-03-16 PROCEDURE — 0054A COVID-19,PF,PFIZER (12+ YRS): CPT

## 2022-03-16 PROCEDURE — 84450 TRANSFERASE (AST) (SGOT): CPT | Performed by: PATHOLOGY

## 2022-03-16 PROCEDURE — 93306 TTE W/DOPPLER COMPLETE: CPT | Mod: 26 | Performed by: INTERNAL MEDICINE

## 2022-03-16 PROCEDURE — 93306 TTE W/DOPPLER COMPLETE: CPT

## 2022-03-16 PROCEDURE — 91305 COVID-19,PF,PFIZER (12+ YRS): CPT

## 2022-03-16 PROCEDURE — 85027 COMPLETE CBC AUTOMATED: CPT | Performed by: PATHOLOGY

## 2022-03-16 PROCEDURE — 36415 COLL VENOUS BLD VENIPUNCTURE: CPT | Performed by: PATHOLOGY

## 2022-03-17 ENCOUNTER — ANCILLARY PROCEDURE (OUTPATIENT)
Dept: GENERAL RADIOLOGY | Facility: CLINIC | Age: 52
End: 2022-03-17
Attending: FAMILY MEDICINE
Payer: COMMERCIAL

## 2022-03-17 ENCOUNTER — OFFICE VISIT (OUTPATIENT)
Dept: ORTHOPEDICS | Facility: CLINIC | Age: 52
End: 2022-03-17
Payer: COMMERCIAL

## 2022-03-17 VITALS — BODY MASS INDEX: 48.03 KG/M2 | WEIGHT: 293 LBS

## 2022-03-17 DIAGNOSIS — G89.29 CHRONIC RIGHT-SIDED LOW BACK PAIN, UNSPECIFIED WHETHER SCIATICA PRESENT: ICD-10-CM

## 2022-03-17 DIAGNOSIS — M25.551 RIGHT HIP PAIN: ICD-10-CM

## 2022-03-17 DIAGNOSIS — M54.50 CHRONIC RIGHT-SIDED LOW BACK PAIN, UNSPECIFIED WHETHER SCIATICA PRESENT: ICD-10-CM

## 2022-03-17 DIAGNOSIS — M67.971 ACHILLES TENDON DISORDER, RIGHT: ICD-10-CM

## 2022-03-17 DIAGNOSIS — M25.551 RIGHT HIP PAIN: Primary | ICD-10-CM

## 2022-03-17 PROCEDURE — 99204 OFFICE O/P NEW MOD 45 MIN: CPT | Performed by: FAMILY MEDICINE

## 2022-03-17 PROCEDURE — 72100 X-RAY EXAM L-S SPINE 2/3 VWS: CPT | Performed by: RADIOLOGY

## 2022-03-17 PROCEDURE — 73502 X-RAY EXAM HIP UNI 2-3 VIEWS: CPT | Mod: RT | Performed by: INTERNAL MEDICINE

## 2022-03-17 NOTE — PATIENT INSTRUCTIONS
Thank you for choosing [facility]    [facility] LOCATIONS  [place] [address] [address] [facility], [address]th [place]   [place], [address] [place] [address]   [phone]       APPOINTMENTS: [phone]    CARE QUESTIONS: [phone], #3    BILLING QUESTIONS: [phone]    FAX NUMBER: [phone]        Follow up: After MRI to review findings.     An order for an MRI was placed today. You may call directly to schedule at [phone] at your convenience.

## 2022-03-17 NOTE — Clinical Note
"    3/17/2022         RE: Tiffani Brasher  [address]  [facility] [address]        Dear Colleague,    Thank you for referring your patient, Tiffani Brasher, to the [facility] [place]. Please see a copy of my visit note below.    CHIEF COMPLAINT:  No chief complaint on file.       HISTORY OF PRESENT ILLNESS  Ms. Brasher is a pleasant 51 year old year old female who presents to clinic today with right hip pain and chronic right achilles pain. She states her doctor thinks her pain is stemming from her achilles.     She states she ruptured her achilles in 2019 and never had surgery, has improved overall but lingering weakness persists.  Tiffani explains that her pain started after she fell in September of last year. Fall on 9/17/22 falling forward on a punching bag on the ground.  Landed on anterior hips and facial region.  CT scan of facial/head and xray hips ordered at that time and unremarkable.    Has been feeling as if her hip has been \"dislocating\" and has associated pain. Component of paresthesias down her leg past knee.  Chronic low back discomfort.    Onset: sudden  Location: right hip posterior, anterior and troch  Quality:  Tingling, sharp  Duration: 6 months   Severity: 10/10 at worst  Timing:constant and intermittent episodes sit to stand, certain laying positions, stairs  Modifying factors:  resting and non-use makes it better, movement and use makes it worse  Associated signs & symptoms: numbness, tingling, itching  Previous similar pain: No  Treatments to date: none    Onset: sudden  Location: achilles tendon  Quality:  aching  Duration: 2 years   Severity: 7/10 at worst  Timing:constant with weight bearing and stairs   Modifying factors:  resting and non-use makes it better, movement and use makes it worse  Associated signs & symptoms: limited ROM and stretching  Previous similar pain: No  Treatments to date: compression sock     Lastly she notes pain of her " "right Achilles tendon.  She was told that she should bring this up at the visit today.  She notes she \"ruptured her tendon\" over a year ago but did not seek medical attention.  She believes it was ruptured based on Internet research.  She has been noticing improvement over time and still remains weak overall.  She has difficulty standing on her toes as well as bleeding with her right ankle.  She is however feeling steady improvement.      Additional history: as documented    Review of Systems:    Have you recently had a a fever, chills, weight loss? No    Do you have any vision problems? No    Do you have any chest pain or edema? No    Do you have any shortness of breath or wheezing?  No    Do you have stomach problems? No    Do you have any numbness or focal weakness? No    Do you have diabetes? No    Do you have problems with bleeding or clotting? No    Do you have an rashes or other skin lesions? No    MEDICAL HISTORY  Patient Active Problem List   Diagnosis     Anemia, iron deficiency     Morbid obesity with BMI of 45.0-49.9, adult (H)     JAXON (generalized anxiety disorder)     Severe episode of recurrent major depressive disorder, without psychotic features (H)     PTSD (post-traumatic stress disorder)     Insomnia, unspecified type     Essential hypertension     Intertrigo     MAIN (obstructive sleep apnea)       Current Outpatient Medications   Medication Sig Dispense Refill     atorvastatin (LIPITOR) 20 MG tablet Take 1 tablet (20 mg) by mouth daily 90 tablet 0     calcium carbonate-vitamin D (CALCIUM 600/VITAMIN D) 600-400 MG-UNIT chewable tablet Take one twice daily and at least 2 hours apart from iron. 180 tablet 1     hydrOXYzine (ATARAX) 25 MG tablet Take 25 mg by mouth Takes to initiate sleep takes as needed. Has Rozeram that alternates in       LORazepam (ATIVAN) 0.5 MG tablet Take 1 tablet (0.5 mg) by mouth daily as needed for anxiety do not take with Remeron, do not drive, drink alcohol or use fine " machinery on this medicine 15 tablet 0     Multiple Vitamins-Iron (QC DAILY MULTIVITAMINS/IRON) TABS Take 1 tablet by mouth daily 90 tablet 1     propranolol ER (INDERAL LA) 80 MG 24 hr capsule Take 1 capsule (80 mg) by mouth daily 90 capsule 1     ROZEREM 8 MG tablet Take 1 tablet (8 mg) by mouth nightly as needed for sleep 30 tablet 0     sertraline (ZOLOFT) 100 MG tablet Take 1.5 tablets (150 mg) by mouth daily 45 tablet 1     UNABLE TO FIND MEDICATION NAME: Liquid iron - dose unknown       vitamin D3 (CHOLECALCIFEROL) 1.25 MG (77435 UT) capsule Take 1 capsule (50,000 Units) by mouth every 7 days 7 capsule 0       No Known Allergies    Family History   Problem Relation Age of Onset     Hypertension Mother      Sleep Apnea Sister      Obesity Sister        Additional medical/Social/Surgical histories reviewed in [facility] and updated as appropriate.       PHYSICAL EXAM  LMP 04/04/2019     General  - normal appearance, in no obvious distress,ectomorphic habitus  Musculoskeletal - lumbar spine  - stance: slow to rise and sit  - inspection: normal bone and joint alignment, no obvious scoliosis  - palpation: bilateral lumbar paravertebral spasm, tenderness at lumbosacral region near L5 bilaterally  - ROM: pain with left rotation, flexion past 45 deg,   - strength: lower extremities 5/5 in all planes  - special tests:  (-) straight leg raise bilaterally  (-) slump test  Musculoskeletal -right hip  - stance: normal gait without limp  - inspection: no swelling or effusion,  normal bone and joint alignment, no obvious deformity  - palpation: no lateral or anterior hip tenderness  - ROM:  Pain and limited flexion past 90, pain and limited internal rotation. ER full and painless  - strength: 5/5 in all planes except hip flexion 4/5 with pain  - special tests:  (-) BENJA  (-) FADIR  no pain with axial femoral load  Musculoskeletal - foot / ankle  - stance: normal gait without limp, difficulty performing heel raise on right  -  inspection: No inspection, no visible deformity  - palpation: tender over mid substance Achilles tendon- ROM:Full ankle ROM in all planes  - strength: 5/5 in all planes  Neuro  - no sensory or motor deficit, grossly normal coordination, normal muscle tone  Skin  - no ecchymosis, erythema, warmth, or induration, no obvious rash  Psych  - interactive, appropriate, normal mood and affect    IMAGING : XR lumbar and pelvis with right hip. Final results and radiologist's interpretation, available in the [facility] health record. Images were reviewed with the patient/family members in the office today. My personal interpretation of the performed imaging is mild bilateral acetabular spurring and mild DJD of hips.  L5-S1 moderate disc space loss with spurring.     ASSESSMENT & PLAN  Ms. Brasher is a 51 year old year old female who presents to clinic today with right anterolateral hip pain since significant fall on 9/17/21 in which CT performed for associated head trauma.  Persisting pain over the past 6 months with sensation of hip dislocation.  Suspected possible hip osteoarthritis, hip flexor injury vs. Other.  Additionally I suspect lumbar radiculitis playing a role in radicular symptoms in association with L5-S1 DDD.    Diagnosis:   1. Lumbar degenerative disc disease  2. Chronic pain of right hip.  3. Primary osteoarthritis of right hip, mild  4. Pain of  Right achilles tendon    Treatment options discussed and at this time we will gain more information with MRI of her right hip.  This is necessary given significant duration greater than 6 months from a traumatic injury.  Also provided in detail about articular surfaces, as well as her hip flexor musculature-there has been significant hip flexor weakness on exam today.  We discussed consideration for formal physical therapy as one avenue versus MRI, but we both settled on MRI imaging.    Does seem her lumbar radiculitis is playing a role but this is only intermittent and not  significantly debilitating for her like her hip.  We will consider formal physical therapy if she will be going for her hip.  We will discuss this further after MRI of her hip.  May consider MRI in the future if increasingly bothersome    Lastly she does seem to suffer from Achilles tendinitis.  Although she mentions rupture, this was not confirmed with imaging and she has no defect  today.  Possibly partial tear versus tendinitis.  We have she would do well and continue respond favorably to formal physical therapy program for home exercises.  I will provide home exercises for her to begin and it is encouraging to hear that this is been improving steadily.    It was a pleasure seeing Tiffani today.    55 minutes on date of the encounter doing chart review, history and examination, independent imaging review, documentation, and additional activities noted above.    Arden Dee DO, [facility]  Primary Care Sports Medicine        Again, thank you for allowing me to participate in the care of your patient.        Sincerely,        Arden Dee DO

## 2022-03-21 ENCOUNTER — OFFICE VISIT (OUTPATIENT)
Dept: PHARMACY | Facility: CLINIC | Age: 52
End: 2022-03-21
Payer: COMMERCIAL

## 2022-03-21 ENCOUNTER — LAB (OUTPATIENT)
Dept: LAB | Facility: CLINIC | Age: 52
End: 2022-03-21

## 2022-03-21 ENCOUNTER — VIRTUAL VISIT (OUTPATIENT)
Dept: FAMILY MEDICINE | Facility: CLINIC | Age: 52
End: 2022-03-21
Payer: COMMERCIAL

## 2022-03-21 VITALS
HEART RATE: 88 BPM | SYSTOLIC BLOOD PRESSURE: 144 MMHG | WEIGHT: 293 LBS | DIASTOLIC BLOOD PRESSURE: 85 MMHG | BODY MASS INDEX: 48.91 KG/M2

## 2022-03-21 DIAGNOSIS — E66.01 MORBID OBESITY WITH BMI OF 45.0-49.9, ADULT (H): ICD-10-CM

## 2022-03-21 DIAGNOSIS — G47.00 INSOMNIA, UNSPECIFIED TYPE: ICD-10-CM

## 2022-03-21 DIAGNOSIS — F41.1 GAD (GENERALIZED ANXIETY DISORDER): ICD-10-CM

## 2022-03-21 DIAGNOSIS — Z11.59 NEED FOR HEPATITIS C SCREENING TEST: Primary | ICD-10-CM

## 2022-03-21 DIAGNOSIS — F43.10 PTSD (POST-TRAUMATIC STRESS DISORDER): ICD-10-CM

## 2022-03-21 DIAGNOSIS — E55.9 VITAMIN D DEFICIENCY: ICD-10-CM

## 2022-03-21 DIAGNOSIS — E78.5 HYPERLIPIDEMIA LDL GOAL <100: ICD-10-CM

## 2022-03-21 DIAGNOSIS — F33.2 SEVERE EPISODE OF RECURRENT MAJOR DEPRESSIVE DISORDER, WITHOUT PSYCHOTIC FEATURES (H): ICD-10-CM

## 2022-03-21 DIAGNOSIS — I10 ESSENTIAL HYPERTENSION: ICD-10-CM

## 2022-03-21 DIAGNOSIS — Z12.11 SCREEN FOR COLON CANCER: ICD-10-CM

## 2022-03-21 DIAGNOSIS — F33.2 SEVERE EPISODE OF RECURRENT MAJOR DEPRESSIVE DISORDER, WITHOUT PSYCHOTIC FEATURES (H): Primary | ICD-10-CM

## 2022-03-21 DIAGNOSIS — R07.9 CHEST PAIN, UNSPECIFIED TYPE: Primary | ICD-10-CM

## 2022-03-21 DIAGNOSIS — R60.9 EDEMA, UNSPECIFIED TYPE: ICD-10-CM

## 2022-03-21 DIAGNOSIS — R06.02 SOB (SHORTNESS OF BREATH): ICD-10-CM

## 2022-03-21 DIAGNOSIS — Z12.31 VISIT FOR SCREENING MAMMOGRAM: ICD-10-CM

## 2022-03-21 DIAGNOSIS — R07.9 CHEST PAIN, UNSPECIFIED TYPE: ICD-10-CM

## 2022-03-21 DIAGNOSIS — Z78.9 ALCOHOL USE: ICD-10-CM

## 2022-03-21 PROCEDURE — 80053 COMPREHEN METABOLIC PANEL: CPT | Performed by: INTERNAL MEDICINE

## 2022-03-21 PROCEDURE — 84484 ASSAY OF TROPONIN QUANT: CPT | Performed by: INTERNAL MEDICINE

## 2022-03-21 PROCEDURE — 99215 OFFICE O/P EST HI 40 MIN: CPT | Mod: 95 | Performed by: INTERNAL MEDICINE

## 2022-03-21 PROCEDURE — 84443 ASSAY THYROID STIM HORMONE: CPT | Performed by: INTERNAL MEDICINE

## 2022-03-21 PROCEDURE — 86803 HEPATITIS C AB TEST: CPT

## 2022-03-21 PROCEDURE — 80061 LIPID PANEL: CPT

## 2022-03-21 PROCEDURE — 99606 MTMS BY PHARM EST 15 MIN: CPT | Performed by: PHARMACIST

## 2022-03-21 PROCEDURE — 82306 VITAMIN D 25 HYDROXY: CPT

## 2022-03-21 PROCEDURE — 36415 COLL VENOUS BLD VENIPUNCTURE: CPT

## 2022-03-21 PROCEDURE — 99607 MTMS BY PHARM ADDL 15 MIN: CPT | Performed by: PHARMACIST

## 2022-03-21 RX ORDER — PROPRANOLOL HYDROCHLORIDE 80 MG/1
80 CAPSULE, EXTENDED RELEASE ORAL DAILY
Qty: 90 CAPSULE | Refills: 1 | Status: SHIPPED | OUTPATIENT
Start: 2022-03-21 | End: 2022-06-20

## 2022-03-21 RX ORDER — RAMELTEON 8 MG/1
8 TABLET, FILM COATED ORAL
Qty: 30 TABLET | Refills: 0 | Status: CANCELLED | OUTPATIENT
Start: 2022-03-21

## 2022-03-21 ASSESSMENT — ANXIETY QUESTIONNAIRES
5. BEING SO RESTLESS THAT IT IS HARD TO SIT STILL: SEVERAL DAYS
GAD7 TOTAL SCORE: 10
7. FEELING AFRAID AS IF SOMETHING AWFUL MIGHT HAPPEN: NEARLY EVERY DAY
IF YOU CHECKED OFF ANY PROBLEMS ON THIS QUESTIONNAIRE, HOW DIFFICULT HAVE THESE PROBLEMS MADE IT FOR YOU TO DO YOUR WORK, TAKE CARE OF THINGS AT HOME, OR GET ALONG WITH OTHER PEOPLE: SOMEWHAT DIFFICULT
6. BECOMING EASILY ANNOYED OR IRRITABLE: SEVERAL DAYS
2. NOT BEING ABLE TO STOP OR CONTROL WORRYING: MORE THAN HALF THE DAYS
3. WORRYING TOO MUCH ABOUT DIFFERENT THINGS: SEVERAL DAYS
1. FEELING NERVOUS, ANXIOUS, OR ON EDGE: MORE THAN HALF THE DAYS

## 2022-03-21 ASSESSMENT — PATIENT HEALTH QUESTIONNAIRE - PHQ9
SUM OF ALL RESPONSES TO PHQ QUESTIONS 1-9: 8
5. POOR APPETITE OR OVEREATING: NOT AT ALL

## 2022-03-21 NOTE — PROGRESS NOTES
Tiffani is a 51 year old who is being evaluated via a billable video visit.      How would you like to obtain your AVS? MyChart  If the video visit is dropped, the invitation should be resent by: Text to cell phone: [phone]  Will anyone else be joining your video visit? No      Video Start Time: 10:15 AM.    Assessment & Plan   Problem List Items Addressed This Visit        Digestive    Morbid obesity with BMI of 45.0-49.9, adult (H)       Circulatory    Essential hypertension    Relevant Medications    propranolol ER (INDERAL LA) 80 MG 24 hr capsule    Other Relevant Orders    CBC with platelets    Comprehensive metabolic panel (BMP + Alb, Alk Phos, ALT, AST, Total. Bili, TP) (Completed)       Behavioral    JAXON (generalized anxiety disorder)    Relevant Orders    Adult Mental Health  Referral    Severe episode of recurrent major depressive disorder, without psychotic features (H)    Relevant Orders    TSH with free T4 reflex (Completed)    Adult Mental Health  Referral    PTSD (post-traumatic stress disorder)    Relevant Orders    Adult Mental Health  Referral       Other    Insomnia, unspecified type      Other Visit Diagnoses     Chest pain, unspecified type    -  Primary    Relevant Medications    atorvastatin (LIPITOR) 40 MG tablet    aspirin (ASA) 81 MG EC tablet    Other Relevant Orders    Troponin I (Completed)    Adult Cardiology Eval  Referral    EKG 12-lead complete w/read - Clinics (Completed)    Screen for colon cancer        Relevant Orders    Fecal colorectal cancer screen FIT - Future (S+30) (Completed)    Visit for screening mammogram        Relevant Orders    MA SCREENING DIGITAL BILAT - Future  (s+30)    Alcohol use        counseled on weaning, risk of alcohol liver disease, monitor liver function test, risk of interaction with other CNS depressants    Relevant Orders    Adult Mental Health  Referral         Discussed multiple health concerns  "today.    Advise referral to cardiology for ongoing chest pain; will place an urgent referral concerning her symptoms for angina like chest pain. If persistent or worsening chest symptoms call 911 and go to ED immediately, advised she will need an EKG, (this is virtual video visit)   Follow-up with psychiatry Dr. Cesar, discussed not to use SSRI or benzodiazepine with alcohol.  We will continue to give short-term refill until she sees psychiatry again.  Continue to see therapy we need records from them.  She does have moderate sleep apnea she uses a CPAP daily.  Advised to follow-up with sleep medicine.  We will check labs today including TSH CBC CMP troponin CK and lipid panel.  She is due for some screening test with mammogram and stool FIT test.  As for the obesity would recommend that she sees weight management program , continue to work on her obesity with diet and exercise as tolerated.  For the right hip problem continue follow-up with orthopedic/sports medicine.  For the hypertension advised patient to call us with blood pressure readings, refill given for propranolol 80 mg she has been taking for chronic..         BMI:   Estimated body mass index is 49.05 kg/m  as calculated from the following:    Height as of this encounter: 1.765 m (5' 9.5\").    Weight as of this encounter: 152.9 kg (337 lb).   Weight management plan: Discussed healthy diet and exercise guidelines    CONSULTATION/REFERRAL to cardiology.  Follow-up with psychiatry.  Work on weight loss  Regular exercise  See Patient Instructions    Return in about 1 week (around 3/28/2022), or if symptoms worsen or fail to improve, for As needed and if symptoms worsen, Lab Work, Physical Exam, BP Recheck.    Megan Phlilips MD  [facility] ELA Nixon is a 51 year old who presents for the following health issues   History of Present Illness       Reason for visit:  Chest pain, hip pain and bariatric  Symptom onset:  More " "than a month  Symptoms include:  Chest pain, hip pain  Symptom intensity:  Severe  Symptom progression:  Worsening  Had these symptoms before:  No    She eats 2-3 servings of fruits and vegetables daily.She consumes 1 sweetened beverage(s) daily.She exercises with enough effort to increase her heart rate 10 to 19 minutes per day.  She exercises with enough effort to increase her heart rate 3 or less days per week. She is missing 1 dose(s) of medications per week.  She is not taking prescribed medications regularly due to other.       Tiffani presenting for follow-up.  She reports she has been doing well.  She was in the [facility] last fall she went to [facility].  She reports in September she had a fall, she was seen in the ER since then she has been having problems with her right hip.  She will be undergoing MRI of the right hip.  She has been more sedentary since her hip problem.  She continues to complain of chest pain shortness of breath with exertion after she rests the pain goes away in a minute or so.  She had an echo done last week.  She is looking forward for bariatric surgery.  She denies any history of Covid infection or exposure to Covid.  She is maintained on sertraline 50 mg for depression, lorazepam as needed for anxiety for the last 6 to 9 months she had only used occasionally.  She uses mainly for severe panic attack.    She also takes propranolol 80 mg for high blood pressure and not for the headache.  Also per records it states she takes that for headache as well.  Sertraline she takes daily for depression.  She is also has has a history of PTSD and as per patient \"is really bad..\".  She continues to see therapist 2 times a week Dr. Valarie Buenrostro in [place].  She reports that the hydroxyzine helps her fall asleep and Rozerem help her maintain sleep to stay asleep.  She has not been taking them daily may be every other week.  She has history of major sleep problems.  She has history of anxiety as " well.  She is having more stressors now that her mother just diagnosed with multiple myeloma.  She does drink alcohol regular basis 2 bottles of wine per week 1 to 2 glasses of wine daily.  She reports she misses using marijuana she has never uses sincenback to [place] she used to use the Gummies for marijuana but not smoking as she has history of asthma in the past, she is not using an inhaler now, she does not feel she needs it.  She has been gaining weight after the fall due to the hip problem.  Her chest pain symptoms have been ongoing for 1 months now.      Review of Systems   Constitutional, HEENT, cardiovascular, pulmonary, GI, , musculoskeletal, neuro, skin, endocrine and psych systems are negative, except as otherwise noted.      Objective           Vitals:  No vitals were obtained today due to virtual visit.    Physical Exam   GENERAL: Healthy, alert and no distress  EYES: Eyes grossly normal to inspection.  No discharge or erythema, or obvious scleral/conjunctival abnormalities.  RESP: No audible wheeze, cough, or visible cyanosis.  No visible retractions or increased work of breathing.    SKIN: Visible skin clear. No significant rash, abnormal pigmentation or lesions.  NEURO: Cranial nerves grossly intact.  Mentation and speech appropriate for age.  PSYCH: Mentation appears normal, affect normal/bright, judgement and insight intact, normal speech and appearance well-groomed.    Hospital Outpatient Visit on 03/16/2022   Component Date Value Ref Range Status     LVEF  03/16/2022 60-65%   Final     LVEF  03/16/2022 60-65%   Final               Video-Visit Details    Type of service:  Video Visit    Video End Time: 10:37 AM  Total time spent was 40 minutes, review of records coordination of care  and addressing multiple health conditions.  Originating Location (pt. Location): Home  Total time spent was 40 minutes, review of records, virtual visit, and addressing multiple marcia concerns including chest  pain.     Distant Location (provider location):  [facility]     Platform used for Video Visit: Kenna

## 2022-03-21 NOTE — PATIENT INSTRUCTIONS
Recommendations from today's MTM visit:                                                    MTM (medication therapy management) is a service provided by a clinical pharmacist designed to help you get the most of out of your medicines.   Today we reviewed what your medicines are for, how to know if they are working, that your medicines are safe and how to make your medicine regimen as easy as possible.      1.  We will check your Vitamin D level and cholesterol labs today.    2.  I will talk with Dr. hPillips about the alprazolam.  This is longer lasting than lorazepam, so not ideal/preferred.  You can also follow-up with psychiatry on this.    Follow-up: Pending lab results    It was great to speak with you today.  I value your experience and would be very thankful for your time with providing feedback on our clinic survey. You may receive a survey via email or text message in the next few days.     To schedule another MTM appointment, please call the clinic directly or you may call the MTM scheduling line at [phone] or toll-free at [phone].     My Clinical Pharmacist's contact information:                                                      Please feel free to contact me with any questions or concerns you have.      Gem Moran, PharmD, [facility]  Medication Therapy Management Provider  Pager: [phone]     Sofia Thompson PharmD  Medication Therapy Management Resident  Pager: [phone]

## 2022-03-21 NOTE — PROGRESS NOTES
Medication Therapy Management (MTM) Encounter    ASSESSMENT:                            Medication Adherence/Access: No issues identified    Depression/Anxiety/PTSD: Symptoms are stable, would not recommend changing lorazepam to alprazolam as alprazolam is longer acting.  She has increased alcohol consumption, while this is not a contraindication with sertraline use, alcohol may impact her differently while taking sertraline (increased risk of impairment of mental and motor skills).  She's appropriately avoiding alcohol with hydroxyzine, lorazepam and Rozerem.    Insomnia: Stable, as above.    Edema/SOB/Chest Pain: Plan in place.    Hypertension: Patient is not meeting blood pressure goal of < 140/90mmHg during our visit today, is just slightly high.  She notes she's feeling anxious today and apprehensive about her trip to [place].  Will await stress test results before determining if additional medication is needed.    Hyperlipidemia: Patient would benefit from re-checking lipids, and more frequent use of atorvastatin.  Depending on results of stress test, may need to escalate to high intensity statin therapy.    Obesity:  Plan in place.    Supplements:  Patient completed Vitamin D replacement therapy, but is not on maintenance therapy.  Due to re-check Vitamin D levels.    PLAN:                            1.  Discussed risks of combining alcohol intake with her medications - she's managing this appropriately and is advised that alcohol along with concurrent sertraline use may increase risk of impairment of mental and motor skills.  2.  Orders placed to check Vitamin D and lipids today.  3.  Recommended against alprazolam.    Follow-up: Return in about 3 months (around 6/21/2022).    SUBJECTIVE/OBJECTIVE:                          Tiffani Brasher is a 51 year old female coming in for a follow-up visit.  Today's visit is a follow-up MTM visit from 8/30/2021.  She had a virtual follow-up appointment with Dr. Phillips  earlier today.    Reason for visit: Routine follow-up.    Tobacco: She reports that she has never smoked. She has never used smokeless tobacco.  Alcohol: 2 bottles of wine per week (has increased recently due to increased family events).  She never drinks wine when she takes hydroxyzine, lorazepam or Rozerem.    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    Medication Adherence/Access: no issues reported    Depression/Anxiety/PTSD: Patient is currently taking sertraline 150 mg daily.  She reports symptoms have been stable.  She continues seeing her therapist regularly.  She has lorazepam available for use, only uses rarely.  She wonders about changing lorazepam to alprazolam - her sister takes alprazolam 0.25mg and she wonders if this might be better for her.  She's apprehensive about how she'll handle things in [place].     Insomnia: Currently taking rozerem 8 mg (rarely, helps her stay asleep) and hydroxyzine 25 mg as needed (rarely, helps her fall asleep).  She reports these are effective when needed, never uses both on the same night.    Edema/SOB/Chest Pain:  She reports edema is better, she continues to have shortness of breath.  She did have an ECHO and has now been referred for a stress test due to shortness of breath/chest pain with exercise.  She is taking aspirin 81mg daily and denies signs and symptoms of bruising/bleeding.  She's been trying to get more exercise - she did have a mechanical fall in [place] earlier in the year, so has had some residual pain from that which has limited exercise.  Otherwise she reports symptoms are stable.    Hypertension: Current medications include propranolol ER 80 mg. Patient reports no current medication side effects. At the time when this was started (June 2020), she had also been having migraines so the intent was for this to help  "with both issues.    BP Readings from Last 3 Encounters:   03/21/22 (!) 144/85   03/22/22 119/77   08/30/21 105/77       Hyperlipidemia: Current therapy includes atorvastatin 20mg - takes about 3/7 days of the week.  She feels \"beaten up when taking it regularly.\"  She's been hesitant to start statin therapy consistently due to not wanting to be on this long term, but says \"if you tell me I should, I will.\"  The 10-year ASCVD risk score (Beaver Baykathy OROZCO Jr., et al., 2013) is: 2.2%    Values used to calculate the score:      Age: 51 years      Sex: Female      Is Non- : No      Diabetic: No      Tobacco smoker: No      Systolic Blood Pressure: 119 mmHg      Is BP treated: Yes      HDL Cholesterol: 61 mg/dL      Total Cholesterol: 280 mg/dL     Recent Labs   Lab Test 08/30/21  1559 03/30/21  0855   CHOL 280* 233*   HDL 69 64   * 145*   TRIG 177* 118      Obesity: Patient has met with Laila Vidal PA-C, at the weight loss clinic. She was given a \"task list\" and is working through this.    Supplements:  Tiffani has been taking calcium/vitamin D twice daily a daily multivitamin and liquid iron occasionally.  She's off high dose Vitamin D, but notes she felt better while taking that.    Vitamin D Deficiency Screening Results:  Lab Results   Component Value Date    VITDT 17 (L) 03/21/2022    VITDT 24 08/30/2021    VITDT 8 (L) 03/30/2021      Today's Vitals: BP (!) 144/85   Pulse 88   Wt (!) 336 lb (152.4 kg)   LMP 04/04/2019   BMI 48.91 kg/m    ----------------    I spent 30 minutes with this patient today. All changes were made via collaborative practice agreement with Dr. Phillips. A copy of the visit note was provided to the patient's provider(s).    The patient was given a summary of these recommendations.     Gem Moran, PharmD, BCACP  Medication Therapy Management Provider  Pager: [phone]      Medication Therapy Recommendations  Hyperlipidemia LDL goal <100    Current " Medication: atorvastatin (LIPITOR) 20 MG tablet   Rationale: Medication requires monitoring - Needs additional monitoring - Effectiveness   Recommendation: Order Lab   Status: Accepted per CPA         Vitamin D deficiency    Current Medication: vitamin D3 (CHOLECALCIFEROL) 1.25 MG (59502 UT) capsule (Discontinued)   Rationale: Medication requires monitoring - Needs additional monitoring - Effectiveness   Recommendation: Order Lab   Status: Accepted per CPA

## 2022-03-22 ENCOUNTER — TELEPHONE (OUTPATIENT)
Dept: FAMILY MEDICINE | Facility: CLINIC | Age: 52
End: 2022-03-22
Payer: COMMERCIAL

## 2022-03-22 VITALS
HEIGHT: 70 IN | RESPIRATION RATE: 16 BRPM | OXYGEN SATURATION: 94 % | TEMPERATURE: 98 F | HEART RATE: 91 BPM | BODY MASS INDEX: 41.95 KG/M2 | SYSTOLIC BLOOD PRESSURE: 119 MMHG | WEIGHT: 293 LBS | DIASTOLIC BLOOD PRESSURE: 77 MMHG

## 2022-03-22 LAB
ALBUMIN SERPL-MCNC: 3.3 G/DL (ref 3.4–5)
ALP SERPL-CCNC: 81 U/L (ref 40–150)
ALT SERPL W P-5'-P-CCNC: 25 U/L (ref 0–50)
ANION GAP SERPL CALCULATED.3IONS-SCNC: 4 MMOL/L (ref 3–14)
AST SERPL W P-5'-P-CCNC: 14 U/L (ref 0–45)
BILIRUB SERPL-MCNC: 0.2 MG/DL (ref 0.2–1.3)
BUN SERPL-MCNC: 13 MG/DL (ref 7–30)
CALCIUM SERPL-MCNC: 9.5 MG/DL (ref 8.5–10.1)
CHLORIDE BLD-SCNC: 110 MMOL/L (ref 94–109)
CHOLEST SERPL-MCNC: 280 MG/DL
CO2 SERPL-SCNC: 27 MMOL/L (ref 20–32)
CREAT SERPL-MCNC: 0.74 MG/DL (ref 0.52–1.04)
DEPRECATED CALCIDIOL+CALCIFEROL SERPL-MC: 17 UG/L (ref 20–75)
FASTING STATUS PATIENT QL REPORTED: NO
GFR SERPL CREATININE-BSD FRML MDRD: >90 ML/MIN/1.73M2
GLUCOSE BLD-MCNC: 114 MG/DL (ref 70–99)
HCV AB SERPL QL IA: NONREACTIVE
HDLC SERPL-MCNC: 61 MG/DL
LDLC SERPL CALC-MCNC: 177 MG/DL
NONHDLC SERPL-MCNC: 219 MG/DL
POTASSIUM BLD-SCNC: 4.1 MMOL/L (ref 3.4–5.3)
PROT SERPL-MCNC: 7.9 G/DL (ref 6.8–8.8)
SODIUM SERPL-SCNC: 141 MMOL/L (ref 133–144)
TRIGL SERPL-MCNC: 208 MG/DL
TROPONIN I SERPL HS-MCNC: 3 NG/L
TSH SERPL DL<=0.005 MIU/L-ACNC: 1.09 MU/L (ref 0.4–4)

## 2022-03-22 RX ORDER — ATORVASTATIN CALCIUM 40 MG/1
40 TABLET, FILM COATED ORAL DAILY
Qty: 90 TABLET | Refills: 1 | Status: SHIPPED | OUTPATIENT
Start: 2022-03-22 | End: 2022-10-17 | Stop reason: SINTOL

## 2022-03-22 ASSESSMENT — ANXIETY QUESTIONNAIRES: GAD7 TOTAL SCORE: 10

## 2022-03-22 ASSESSMENT — PAIN SCALES - GENERAL: PAINLEVEL: MODERATE PAIN (4)

## 2022-03-22 NOTE — TELEPHONE ENCOUNTER
Provider called patient advised her since she is having episode of shortness of breath?  Chest tightness with exertion, to get evaluated in the ER tonight and get an EKG and some cardiac monitoring.  Patient in agreement, she would go to [facility].

## 2022-03-23 ENCOUNTER — TRANSFERRED RECORDS (OUTPATIENT)
Dept: HEALTH INFORMATION MANAGEMENT | Facility: CLINIC | Age: 52
End: 2022-03-23

## 2022-03-23 NOTE — TELEPHONE ENCOUNTER
Patient was seen in the clinic today, EKG was done.  Patient is currently asymptomatic from cardiac standpoint, currently she has no active current chest pain.  EKG reviewed, normal sinus rhythm nonspecific ST-T wave changes.  Patient states is scheduled with cardiology this upcoming Thursday 24th.  Advised patient if any acute chest pain to go to the ER immediately for further evaluation.  Patient does gets chest pain/dyspnea symptoms only with exertion.  She reports an element of physical deconditioning as well with weight gain.  Increased dose of atorvastatin to 40 mg 1 tablet once daily [patient states has not been taking her statins]  Started patient on aspirin 81 mg daily.  Patient declined sublingual nitroglycerin as needed.    *Discussed case with ED physician, patient currently not having active chest pain.  ED physician concurred with and advised outpatient cardiac evaluation with above recommendations including as needed sublingual nitroglycerin, patient declined sublingual nitroglycerin to be ordered on as-needed basis.

## 2022-03-23 NOTE — RESULT ENCOUNTER NOTE
Discussed elevated LDL and lipid panel with patient, advised her to start on atorvastatin increased dose of 40 mg 1 tablet once daily

## 2022-03-24 ENCOUNTER — OFFICE VISIT (OUTPATIENT)
Dept: CARDIOLOGY | Facility: CLINIC | Age: 52
End: 2022-03-24
Payer: COMMERCIAL

## 2022-03-24 VITALS
BODY MASS INDEX: 41.95 KG/M2 | HEART RATE: 69 BPM | WEIGHT: 293 LBS | OXYGEN SATURATION: 96 % | HEIGHT: 70 IN | SYSTOLIC BLOOD PRESSURE: 128 MMHG | DIASTOLIC BLOOD PRESSURE: 84 MMHG

## 2022-03-24 DIAGNOSIS — R00.2 PALPITATIONS: ICD-10-CM

## 2022-03-24 DIAGNOSIS — R06.09 DYSPNEA ON EXERTION: Primary | ICD-10-CM

## 2022-03-24 DIAGNOSIS — R07.9 CHEST PAIN, UNSPECIFIED TYPE: ICD-10-CM

## 2022-03-24 PROCEDURE — 99204 OFFICE O/P NEW MOD 45 MIN: CPT | Performed by: INTERNAL MEDICINE

## 2022-03-24 PROCEDURE — 82274 ASSAY TEST FOR BLOOD FECAL: CPT

## 2022-03-24 NOTE — PROGRESS NOTES
Single 51   No family here  No MI No diabetes elevated cholesterol just started. No family history.     Mom with multiple myeloma   Brother kidney stones  Sister with dizziness     Allergies  Tobacco  etoh drink wine occasionally. In [place] used THC.    Propanolol for hypertension and migraines  Setraline.    Surgeries April 23 2020 hysterectomy   NELSON in 2006 antibiotic related     Chest pain always accompanies dyspnea  Chest discomfort since 1/2022 more conscious always with exertion relieved by rest 2 to 3 minutes example bothered while exertion. Focal discomfort.no radiation. Cold sweat. Mild nausea.     Breathing laying in bed watching movie takes.  Fell injured right hip.   Notes irregular  Heart rate at rest. 3 months. 2 twice.

## 2022-03-24 NOTE — LETTER
3/24/2022    Megan Phillips MD  [address]  [facility] [id number]    RE: Tiffani Beronica Brasher       Dear Colleague,     I had the pleasure of seeing Tiffani Brasher in the [facility].  Single 51   No family here  No MI No diabetes elevated cholesterol just started. No family history.     Mom with multiple myeloma   Brother kidney stones  Sister with dizziness     Allergies  Tobacco  etoh drink wine occasionally. In [place] used THC.    Propanolol for hypertension and migraines  Setraline.    Surgeries April 23 2020 hysterectomy   NELSON in 2006 antibiotic related     Chest pain always accompanies dyspnea  Chest discomfort since 1/2022 more conscious always with exertion relieved by rest 2 to 3 minutes example bothered while exertion. Focal discomfort.no radiation. Cold sweat. Mild nausea.     Breathing laying in bed watching movie takes.  Fell injured right hip.   Notes irregular  Heart rate at rest. 3 months. 2 twice.          Thank you for allowing me to participate in the care of your patient.      Sincerely,     Kye Huston MD     [facility]  cc:   Megan Phillips MD  [address]  [place] [address]        
Yes - the patient is able to be screened

## 2022-03-24 NOTE — PROGRESS NOTES
Service Date: 03/24/2022    HISTORY OF PRESENT ILLNESS Tiffani Brasher, a 51-year-old woman with hypertension, migraine headaches, dyslipidemia and obesity, was evaluated in consultation at your request for chest discomfort and dyspnea.    Since 01/2022, Ms. Brasher describes exercise-associated chest discomfort associated with dyspnea and mild nausea.  The discomfort has occurred on a reliable basis with activity and relieved by rest.  There have been no episodes awakening from sleep or occurring at rest.    She has also describes episodes of dyspnea which are not accompanied by chest discomfort and may occur while lying in bed watching movies.  The patient has not been able to exercise on a regular basis because she injured her right hip during a fall.  She also notes  irregular heartbeat without sustained tachycardia or syncope.  These palpitations have occurred in the last 3 months and occur twice per week.  ECG in your office was within normal limits.  An echocardiogram showed normal ejection fraction with no regional wall motion abnormalities or valvular insufficiency.  The patient's symptoms remain unchanged since last seen in your office.    PAST MEDICAL HISTORY:    1.  Dyslipidemia, on atorvastatin 40 mg daily.  The patient admits she just started the medication last week.  2.  Hypertension, well controlled with propranolol.  3.  Migraine headaches, well controlled with propranolol.  4.  Obesity.  5.  Status post hysterectomy.    ALLERGIES:  None known.    HABITS:  The patient does not abuse tobacco, alcohol or illicit substances.    SOCIAL HISTORY:  The patient is single.  She is a .  She used to live in [place], but has now moved to [place].    FAMILY HISTORY:  She has a mother with multiple myeloma, a brother with kidney stones, a sister who suffers from frequent dizziness, for which her physicians have been unable to determine an etiologyy    REVIEW OF SYSTEMS:  A 12-point review of  systems was performed.  Outside the issues mentioned in HPI, there were no other complaints.    MEDICATIONS:    1.  Aspirin 81 mg daily.  2.  Atorvastatin 40 mg daily.  3.  Propranolol ER 80 mg daily.  4.  Sertraline 100 mg tablets 1.5 tablets daily.    PHYSICAL EXAMINATION:    GENERAL:  Demonstrates a very pleasant, cooperative, articulate and intelligent 51-year-old woman who appears younger than stated age.  VITAL SIGNS:  Blood pressure is 128/84, heart rate 69.  Her height is 1.77 meters.  Her weight is 152.4 kg.  Her BMI is 48.9.  RESPIRATORY:  Her lungs are clear to percussion and auscultation.  CARDIOVASCULAR:  Shows a normal S1 with a normal S2.  There is no S3.  There is no murmur, rub or click.  Her pulses are symmetrical in the carotid, radial, brachial femoral, popliteal, dorsalis pedis and posterior tibials.  ABDOMEN:  Obesity is present.  I cannot palpate internal organs.  NEUROLOGIC:  Cranial nerves II through XII are intact.  Strength equal and symmetrical.  She displays normal insight and judgment.    LABORATORY STUDIES:  Her most recent cholesterol is 280 with an LDL of 177, HDL 61, triglycerides 208.  This lipid profile was done without being on a statin drug.      I have personally reviewed her echocardiogram that shows a normal ejection fraction with no regional wall motion abnormalities.  There is mild concentric LVH.  There is no significant valvular stenosis or insufficiency.      Her ECG shows a normal sinus rhythm with normal axis, normal intervals.    ASSESSMENT:  This 51-year-old woman with  hypertension, dyslipidemia and obesity, presents with exertional chest discomfort /dyspnea  with features, worrisome for angina pectoris. I reviewed the benefits of a regular exercise program and Mediterranean style, limited sodium weight loss diet. She understands the importance of weight loss in reducing her cardiovascular risk.      We discussed the diagnostic  alternatives including a regadenoson  nuclear stress test , invasive coronary angiography or CT coronary angiogram. Her inability to exercise due to recent hip injury and her body habitus may make nuclear stress testing less accurate and technically challenging. Invasive coronary angiography as a first step carries a small, but definite risk of death, MI, stroke and bleeding complication. After our discussion together, we concluded a CT coronary angiogram would be the best first step. If CT coronary angiography is abnormal or non-diagnostic, then invasive coronary angiography would be the next step. If CT coronary angiography is normal, then I would favor aggressive lifestyle intervention, statin and blood pressure control without further testing.     In view of her normal TTE, I am doubtful her palpitations represent a life-threatening arrhythmia, but in view of her risk factors for atrial fibrillation, I favor formal evaluation with ambulatory monitoring    RECOMMENDATIONS:    1.  Agree with current medical therapy including high potency statin, beta blocker and low dose aspirn.  3.  Mediterranean-style weight loss diet.  4.  Regular exercise program once her hip injury permits.  5.  Coronary CTA.  If the study is normal, I would not recommend any further testing.  If the study is abnormal or non-diagnostic , we would consider angiography.  6.  Zio Patch monitor to assess palpitations.  It appears unlikely there is a life-threatening arrhythmia.    We greatly appreciate the opportunity to care for your patient, Reji Desir.      cc:   Megan Phillips MD  [facility]  [address]  [place] [address]     Kye Huston MD        D: 2022   T: 2022   MT: DIVYA    Name:     REJI DESIR  MRN:      [id number]        Account:      [id number]   :      1970           Service Date: 2022       Document: [id number]

## 2022-03-25 ENCOUNTER — MYC REFILL (OUTPATIENT)
Dept: FAMILY MEDICINE | Facility: CLINIC | Age: 52
End: 2022-03-25
Payer: COMMERCIAL

## 2022-03-25 DIAGNOSIS — G47.00 INSOMNIA, UNSPECIFIED TYPE: ICD-10-CM

## 2022-03-25 DIAGNOSIS — F41.1 GAD (GENERALIZED ANXIETY DISORDER): ICD-10-CM

## 2022-03-26 ENCOUNTER — HEALTH MAINTENANCE LETTER (OUTPATIENT)
Age: 52
End: 2022-03-26

## 2022-03-28 RX ORDER — LORAZEPAM 0.5 MG/1
0.5 TABLET ORAL DAILY PRN
Qty: 15 TABLET | Refills: 0 | Status: SHIPPED | OUTPATIENT
Start: 2022-03-28 | End: 2022-07-28

## 2022-03-28 RX ORDER — RAMELTEON 8 MG/1
8 TABLET, FILM COATED ORAL
Qty: 30 TABLET | Refills: 0 | Status: SHIPPED | OUTPATIENT
Start: 2022-03-28 | End: 2022-07-22

## 2022-03-28 NOTE — TELEPHONE ENCOUNTER
Routing refill request to provider for review/approval because:  Drug not on the [facility] refill protocol   Anabelle Arreola RN

## 2022-03-29 LAB — HEMOCCULT STL QL IA: NEGATIVE

## 2022-04-22 ENCOUNTER — VIRTUAL VISIT (OUTPATIENT)
Dept: SURGERY | Facility: CLINIC | Age: 52
End: 2022-04-22
Payer: COMMERCIAL

## 2022-04-22 DIAGNOSIS — Z53.9 ERRONEOUS ENCOUNTER--DISREGARD: Primary | ICD-10-CM

## 2022-05-02 ENCOUNTER — TELEPHONE (OUTPATIENT)
Dept: PSYCHIATRY | Facility: CLINIC | Age: 52
End: 2022-05-02

## 2022-05-02 ENCOUNTER — MYC MEDICAL ADVICE (OUTPATIENT)
Dept: ORTHOPEDICS | Facility: CLINIC | Age: 52
End: 2022-05-02
Payer: COMMERCIAL

## 2022-05-02 DIAGNOSIS — M25.551 RIGHT HIP PAIN: ICD-10-CM

## 2022-05-02 DIAGNOSIS — G89.29 CHRONIC RIGHT-SIDED LOW BACK PAIN, UNSPECIFIED WHETHER SCIATICA PRESENT: Primary | ICD-10-CM

## 2022-05-02 DIAGNOSIS — M54.50 CHRONIC RIGHT-SIDED LOW BACK PAIN, UNSPECIFIED WHETHER SCIATICA PRESENT: Primary | ICD-10-CM

## 2022-05-02 NOTE — TELEPHONE ENCOUNTER
X-ray guided right hip intra-articular injection pended.   Please review and sign if appropriate.     Kayla Yanez, ATC

## 2022-05-02 NOTE — TELEPHONE ENCOUNTER
"Writer called [phone] and spoke to the patient sharing that Josafat Reyes is no longer a provider with the Psych clinic. Patient was seen once in the clinic (3/24/2021) and is inquiring about setting an appointment with a new provider. Check of patient's chart showed a \"RTC of 1 month\". Patient shared she thought the clinic would call her to schedule her follow up appointment. Writer explained that it is the responsibility of the patient to reschedule.  Writer explained that due to the volume of patients the clinic was unable to continue care for Josafat Reyes's patient's.     Writer also explained that since it has been over a year since being seen, she is no longer a patient with Psychiatry clinic. Writer explained the Wait List for new patient's is 6+ months. Patient declined to be put on the wait list. Patient also asked for a letter stating the clinic is not able to continue care for Josafat Reyes's patient's. Writer will MyChart the letter that was sent to Josafat Reyes's patient's including the list of community providers Patient shared her PCP Dr. Megan Phillips has been managing her medications and was asked that patient find a new Psychiatrist to take over care of medication management. She is asking for the letter to see if her PCP will continue with medication management. Writer agreed.     Writer spoke to Makenna Crouch regarding the patient was not sent the letter from Josafat Reyes stating he will be leaving the Clinic. Makenna asked writer to send the original letter sent to patients from Josafat Reyes via [employer]. Writer agreed Roshni Shafer LPN    .     "

## 2022-05-02 NOTE — LETTER
November 1, 2021            Dear patient and family:      On behalf of the [facility], the [facility] I m writing to inform you that my last day at the clinic will be November 19, 2021 at the [facility], [place].    I have always felt that it is a great privilege that you entrusted part of your therapeutic care to me, and our clinic will be happy to assist in any way to help locate a new therapist/physician for your care, if needed.    As a result, we recommend calling your insurance company for other options, or reaching out to one of our preferred community providers:      [facility] [phone]      [facility] [phone]      [facility] [phone]      [facility] [phone]      [facility] [phone]      [facility] [phone]    You will find our Authorization to Release Protected Health Information form enclosed or online [email or web address]    Once you have selected a new provider, fill out this form and mail it to the address on the form. Our Health Information department will then send a copy of your records to your new provider and clinic. Please contact our office at [phone] if you need assistance with your records.    With warm regards,    Josafat Mcbride, [facility]

## 2022-05-03 NOTE — TELEPHONE ENCOUNTER
Ervin Garza,    I will sign the order for hip injection.  She should also pursue MRI lumbar spine.  I will make sure this order is in.    I will see her back in person after the injection and MRI though. No virtual.    Thanks

## 2022-05-05 ENCOUNTER — VIRTUAL VISIT (OUTPATIENT)
Dept: SURGERY | Facility: CLINIC | Age: 52
End: 2022-05-05
Payer: COMMERCIAL

## 2022-05-05 DIAGNOSIS — E66.01 MORBID OBESITY (H): Primary | ICD-10-CM
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
## 2022-05-05 NOTE — PROGRESS NOTES
Please advise patient follow-up with cardiology recommendation, she had a CT coronary angiogram that was advised by cardiology.  Has patient scheduled for the imaging procedure.?

## 2022-05-06 ENCOUNTER — TELEPHONE (OUTPATIENT)
Dept: FAMILY MEDICINE | Facility: CLINIC | Age: 52
End: 2022-05-06
Payer: COMMERCIAL

## 2022-05-06 NOTE — TELEPHONE ENCOUNTER
Routed Note    Author: Megan Phillips MD Service: -- Author Type: Physician   Filed: 5/5/2022 12:50 PM Encounter Date: 5/5/2022 Status: Signed   : Megan Phillips MD (Physician)        Please advise patient follow-up with cardiology recommendation, she had a CT coronary angiogram that was advised by cardiology.  Has patient scheduled for the imaging procedure.?

## 2022-05-06 NOTE — TELEPHONE ENCOUNTER
Pt has upcoming appointments for cardiovascular imaging tests per Dr Ross (saw this provider on 3/24/22)     Appointments in Next Year    May 11, 2022  3:00 PM  (Arrive by 2:45 PM)  Pre-Surgical Evaluation with RENE Moya MD  [facility] [place] ([facility] ) [phone]   May 12, 2022  1:00 PM  CV NURSE with  CV CT NURSE  [facility] ([facility] ) [phone]   May 12, 2022  2:00 PM  CTA ANGIOGRAM CORONARY ARTERY with SCICT1  [facility] ([facility] ) [phone]   May 13, 2022 10:00 AM  ZIOPATCH MONITOR with RSCC DEVICE [facility] ([facility] ) [phone]   May 13, 2022 11:30 AM  (Arrive by 11:15 AM)  XR JOINT INJECTION ASPIRATION MAJOR RT with SHXR4, SHIRPROCRAD, SH MSK RAD  [facility] ([facility] ) [phone]   May 15, 2022  5:00 PM  (Arrive by 4:30 PM)  MR LUMBAR SPINE W/O CONTRAST with SHMRP2  [facility] ([facility] ) [phone]   May 19, 2022 12:00 PM  (Arrive by 11:45 AM)  Video Visit with Yosvany George, PhD  [facility] [place] ([facility] ) [phone]          Flaca Wagoner RN  [facility]

## 2022-05-11 ENCOUNTER — OFFICE VISIT (OUTPATIENT)
Dept: SURGERY | Facility: CLINIC | Age: 52
End: 2022-05-11
Payer: COMMERCIAL

## 2022-05-11 VITALS
SYSTOLIC BLOOD PRESSURE: 126 MMHG | WEIGHT: 293 LBS | HEART RATE: 99 BPM | DIASTOLIC BLOOD PRESSURE: 80 MMHG | OXYGEN SATURATION: 100 % | BODY MASS INDEX: 41.95 KG/M2 | HEIGHT: 70 IN

## 2022-05-11 DIAGNOSIS — E66.01 MORBID OBESITY WITH BMI OF 45.0-49.9, ADULT (H): Primary | ICD-10-CM

## 2022-05-11 DIAGNOSIS — G47.33 OSA (OBSTRUCTIVE SLEEP APNEA): ICD-10-CM

## 2022-05-11 DIAGNOSIS — I10 ESSENTIAL HYPERTENSION: ICD-10-CM

## 2022-05-11 PROCEDURE — 99215 OFFICE O/P EST HI 40 MIN: CPT | Performed by: FAMILY MEDICINE

## 2022-05-11 PROCEDURE — 99417 PROLNG OP E/M EACH 15 MIN: CPT | Performed by: FAMILY MEDICINE

## 2022-05-11 RX ORDER — NICOTINE POLACRILEX 4 MG
15-30 LOZENGE BUCCAL
Status: DISCONTINUED | OUTPATIENT
Start: 2022-05-11 | End: 2022-05-14 | Stop reason: HOSPADM

## 2022-05-11 RX ORDER — DEXTROSE MONOHYDRATE 25 G/50ML
25-50 INJECTION, SOLUTION INTRAVENOUS
Status: DISCONTINUED | OUTPATIENT
Start: 2022-05-11 | End: 2022-05-14 | Stop reason: HOSPADM

## 2022-05-11 NOTE — LETTER
Tiffani Brasher  May 11, 2022        Bariatric Task List      Required Weight loss:    Lose 10 (2 more) lbs prior to surgery.  Goal Weight: 326 lbs  Tasks:  Have laboratory tests drawn.     Schedule mammogram    Psychological Evaluation with MMPI and clearance for weight loss surgery.    Achieve clearance from dietitian to see surgeon.    Obtain a letter of support from your current therapist.    Obtain a letter of clearance from your cardiologist.

## 2022-05-11 NOTE — LETTER
Tiffani Brasehr  May 11, 2022        Bariatric Task List      Required Weight loss:    Lose 2 more lbs prior to surgery.  Goal Weight: 326 lbs  Tasks:  Have preoperative laboratory tests drawn.     Schedule mammogram    Psychological Evaluation with MMPI and clearance for weight loss surgery.    Achieve clearance from dietitian to see surgeon.    Letter of support from your current therapist.    Obtain clearance for surgery from your cardiologist.

## 2022-05-11 NOTE — PROGRESS NOTES
5/11/2022  Visit With: ALANNA Moya MD, MD Clearytonny Brasher is a 51 year old year old female who is hoping to have Bariatric surgery. Initial visit was done virtually. Patient presents today in clinic for official weight, physical exam, and to discuss potential procedures. She initially started the program in 2020. She then re established care 4/21/21. She has now been lost to follow up for 8 months.         Assessment/Plan:  1. Morbid obesity with BMI of 45.0-49.9, adult (H)  We reviewed the bariatric task list, highlighting areas that still need to be completed. She will continue to work on testing with Yosvany. She will schedule her mammogram. I ordered a ferritin and PTH. She will revisit with Bridgett. She will obtain a clearance letter from her current therapist and her cardiologist.  Today in the office we discussed gastric sleeve surgery. Preoperative, perioperative, and postoperative processes, management, and follow up were addressed.  Risks and benefits were outlined including the risk of death, PE, DVT, ulcer, worsening GERD, N/V, stricture, hernia, wound infection, weight regain, and vitamin deficiencies. I emphasized exercise and activity along with appropriate food choice as the main foundation for weight loss with surgery providing surgical reinforcement of this.  A goal sheet and support group handout were given to the patient. Patient contract was signed.    Once the patient has completed their task list and there are no further recommendations, they will see the surgeon for consultation for bariatric surgery.  All questions were answered. Patient verbalizes understanding of the process to surgery and expectations for the postoperative period including the need for lifelong lifestyle changes, vitamin supplementation, and laboratory monitoring.    2. MAIN (obstructive sleep apnea)  She will continue to use CPAP. This may improve with healthy habits and weight loss.    3. Essential  hypertension  This may improve with healthy habits and weight loss.      Follow up: next available with the dietician.      Interim History:  Patient has been lost to follow up for 8 months. She recently initiated visits with the psychologist. She has seen our dietician in the past but not for many months. She has her CT angiogram scheduled for tomorrow. She has been attending bariatric forums. She is thinking she wants a sleeve gastrectomy.        Allergies   Patient has no known allergies.  Medications     Current Outpatient Medications   Medication Sig Dispense Refill     aspirin (ASA) 81 MG EC tablet Take 1 tablet (81 mg) by mouth daily 90 tablet 1     atorvastatin (LIPITOR) 40 MG tablet Take 1 tablet (40 mg) by mouth daily 90 tablet 1     hydrOXYzine (ATARAX) 25 MG tablet Take 25 mg by mouth Takes to initiate sleep takes as needed. Has Rozeram that alternates in       LORazepam (ATIVAN) 0.5 MG tablet Take 1 tablet (0.5 mg) by mouth daily as needed for anxiety do not take with Remeron, do not drive, drink alcohol or use fine machinery on this medicine 15 tablet 0     Multiple Vitamins-Iron (QC DAILY MULTIVITAMINS/IRON) TABS Take 1 tablet by mouth daily 90 tablet 1     propranolol ER (INDERAL LA) 80 MG 24 hr capsule Take 1 capsule (80 mg) by mouth daily 90 capsule 1     ROZEREM 8 MG tablet Take 1 tablet (8 mg) by mouth nightly as needed for sleep 30 tablet 0     sertraline (ZOLOFT) 100 MG tablet Take 1.5 tablets (150 mg) by mouth daily 45 tablet 1     UNABLE TO FIND MEDICATION NAME: Liquid iron - dose unknown       calcium carbonate-vitamin D (CALCIUM 600/VITAMIN D) 600-400 MG-UNIT chewable tablet Take one twice daily and at least 2 hours apart from iron. (Patient not taking: Reported on 5/11/2022) 180 tablet 1     Problem List     Patient Active Problem List   Diagnosis     Anemia, iron deficiency     Morbid obesity with BMI of 45.0-49.9, adult (H)     JAXON (generalized anxiety disorder)     Severe episode of  "recurrent major depressive disorder, without psychotic features (H)     PTSD (post-traumatic stress disorder)     Insomnia, unspecified type     Essential hypertension     Intertrigo     MAIN (obstructive sleep apnea)     Past Medical History     Past Medical History:   Diagnosis Date     Anemia      Anemia, iron deficiency 4/9/2019     Essential hypertension 11/20/2020     JAXON (generalized anxiety disorder) 10/8/2018     Insomnia, unspecified type 11/20/2020     MAIN (obstructive sleep apnea) 4/13/2021     PTSD (post-traumatic stress disorder) 3/1/2019     Severe episode of recurrent major depressive disorder, without psychotic features (H) 10/8/2018     Surgical History     Past Surgical History:   Procedure Laterality Date     GYN SURGERY  2007    myomectomy     LAPAROSCOPIC HYSTERECTOMY TOTAL N/A 4/23/2019    Procedure: single site total laparoscopic hysterectomy, lysis of adhesion, drainage of ovarian cyst;  Surgeon: Vicki Hamilton MD;  Location: [facility] OR     ORTHOPEDIC SURGERY Right     knee          Examination     Ht Readings from Last 1 Encounters:   05/11/22 1.765 m (5' 9.5\")     Wt Readings from Last 1 Encounters:   05/11/22 149 kg (328 lb 6.4 oz)     Body mass index is 47.8 kg/m .  /80   Pulse 99   Ht 1.765 m (5' 9.5\")   Wt 149 kg (328 lb 6.4 oz)   LMP 04/04/2019   SpO2 100%   BMI 47.80 kg/m      Physical Exam:    GEN: Alert and oriented in no acute distress.   HEENT: mucous membranes moist, teeth adequate  NECK: supple with LAD or thyromegaly  LUNGS: CTA without wheezes or crackles, good air movement throughout  CV: RRR no MRG  ABDOMEN: moderate protuberance  SKIN: no rashes, no skin tags, noacanthosis nigrans  EXTREMITIES: trace edema     LABS: ferritin and PTH ordered      Patient was reminded that, to avoid marginal ulcers she should avoid tobacco at all, alcohol in excess, caffeine in excess, and NSAIDS (unless indicated for cardioprotection or othewise and opposed by a PPI).    She was " encouraged to establish and maintain a consistent physical activity routine, 6-8 hours of restorative sleep each night and optimal stress management.    Patient was reminded about the importance of life long vitamin supplementation and life long follow up.    Total time spent on the date of this encounter doing: chart review, review of test results, patient visit, physical exam, education, counseling, developing plan of care and documenting = 91 minutes.

## 2022-05-11 NOTE — PATIENT INSTRUCTIONS
Tiffani Brasher  May 11, 2022        Bariatric Task List      Required Weight loss:    Lose 2 more lbs prior to surgery.  Goal Weight: 326 lbs  Tasks:  Have preoperative laboratory tests drawn.     Schedule mammogram    Psychological Evaluation with MMPI and clearance for weight loss surgery.    Achieve clearance from dietitian to see surgeon.    Letter of support from your current therapist.    Obtain clearance for surgery from your cardiologist.    Next Steps:    As you complete the tasks above and we receive the reports, your chart will be reviewed.  If there are any recommendations from your evaluations for you to be better prepared for surgery, we will contact you. These recommendations must be completed before seeing a surgeon.  If there are no further recommendations and you bariatric task list is complete, we will call to schedule an appointment with the surgeon.    After your visit with the surgeon, we will process your information and submit your file to your insurance company for prior authorization. (It can take up to 2-4 weeks to receive approval from the insurance company.)   Once we receive confirmation of insurance approval, we will contact you to schedule your surgery date.

## 2022-05-12 ENCOUNTER — TELEPHONE (OUTPATIENT)
Dept: CARDIOLOGY | Facility: CLINIC | Age: 52
End: 2022-05-12
Payer: COMMERCIAL

## 2022-05-12 DIAGNOSIS — R06.09 DYSPNEA ON EXERTION: ICD-10-CM

## 2022-05-12 DIAGNOSIS — I20.89 EXERTIONAL ANGINA (H): ICD-10-CM

## 2022-05-12 DIAGNOSIS — R07.9 CHEST PAIN, UNSPECIFIED TYPE: Primary | ICD-10-CM

## 2022-05-12 NOTE — TELEPHONE ENCOUNTER
Call received from Dr. Vera. Pt arrived for CT per Dr. Huston. Pt evaluated attesting. Per Dr. Vera recommendation for cardiac MRI with stress would be more appropriate for pt based on body habitus. Orders placed per provider. Routing to primary care team for FYI / Review.   SUSHMA Martinez RN, BSN. 05/12/22 2:09 PM

## 2022-05-13 ENCOUNTER — HOSPITAL ENCOUNTER (OUTPATIENT)
Facility: CLINIC | Age: 52
Discharge: HOME | End: 2022-05-13
Admitting: PHYSICIAN ASSISTANT
Payer: COMMERCIAL

## 2022-05-13 ENCOUNTER — HOSPITAL ENCOUNTER (OUTPATIENT)
Dept: GENERAL RADIOLOGY | Facility: CLINIC | Age: 52
Discharge: HOME | End: 2022-05-13
Attending: FAMILY MEDICINE
Payer: COMMERCIAL

## 2022-05-13 VITALS — HEART RATE: 62 BPM | OXYGEN SATURATION: 98 % | SYSTOLIC BLOOD PRESSURE: 99 MMHG | DIASTOLIC BLOOD PRESSURE: 63 MMHG

## 2022-05-13 DIAGNOSIS — M25.551 RIGHT HIP PAIN: ICD-10-CM

## 2022-05-13 DIAGNOSIS — G47.33 OSA (OBSTRUCTIVE SLEEP APNEA): Primary | ICD-10-CM

## 2022-05-13 PROCEDURE — 250N000011 HC RX IP 250 OP 636: Performed by: FAMILY MEDICINE

## 2022-05-13 PROCEDURE — 77002 NEEDLE LOCALIZATION BY XRAY: CPT

## 2022-05-13 PROCEDURE — 255N000002 HC RX 255 OP 636: Performed by: FAMILY MEDICINE

## 2022-05-13 PROCEDURE — 250N000009 HC RX 250: Performed by: FAMILY MEDICINE

## 2022-05-13 RX ORDER — TRIAMCINOLONE ACETONIDE 40 MG/ML
40 INJECTION, SUSPENSION INTRA-ARTICULAR; INTRAMUSCULAR ONCE
Status: COMPLETED | OUTPATIENT
Start: 2022-05-13 | End: 2022-05-13

## 2022-05-13 RX ORDER — BUPIVACAINE HYDROCHLORIDE 5 MG/ML
30 INJECTION, SOLUTION PERINEURAL ONCE
Status: COMPLETED | OUTPATIENT
Start: 2022-05-13 | End: 2022-05-13

## 2022-05-13 RX ORDER — IOPAMIDOL 408 MG/ML
10 INJECTION, SOLUTION INTRATHECAL ONCE
Status: COMPLETED | OUTPATIENT
Start: 2022-05-13 | End: 2022-05-13

## 2022-05-13 RX ADMIN — BUPIVACAINE HYDROCHLORIDE 4 ML: 5 INJECTION, SOLUTION EPIDURAL; INTRACAUDAL; PERINEURAL at 12:03

## 2022-05-13 RX ADMIN — LIDOCAINE HYDROCHLORIDE 3 ML: 10 INJECTION, SOLUTION EPIDURAL; INFILTRATION; INTRACAUDAL; PERINEURAL at 12:00

## 2022-05-13 RX ADMIN — IOPAMIDOL 1.5 ML: 408 INJECTION, SOLUTION INTRATHECAL at 12:01

## 2022-05-13 RX ADMIN — TRIAMCINOLONE ACETONIDE 40 MG: 40 INJECTION, SUSPENSION INTRA-ARTICULAR; INTRAMUSCULAR at 12:03

## 2022-05-15 ENCOUNTER — HOSPITAL ENCOUNTER (OUTPATIENT)
Dept: MRI IMAGING | Facility: CLINIC | Age: 52
Discharge: HOME | End: 2022-05-15
Attending: FAMILY MEDICINE | Admitting: FAMILY MEDICINE
Payer: COMMERCIAL

## 2022-05-15 DIAGNOSIS — M54.50 CHRONIC RIGHT-SIDED LOW BACK PAIN, UNSPECIFIED WHETHER SCIATICA PRESENT: ICD-10-CM

## 2022-05-15 DIAGNOSIS — G89.29 CHRONIC RIGHT-SIDED LOW BACK PAIN, UNSPECIFIED WHETHER SCIATICA PRESENT: ICD-10-CM

## 2022-05-15 PROCEDURE — 72148 MRI LUMBAR SPINE W/O DYE: CPT

## 2022-05-16 ENCOUNTER — HOSPITAL ENCOUNTER (OUTPATIENT)
Dept: CARDIOLOGY | Facility: CLINIC | Age: 52
Discharge: HOME | End: 2022-05-16
Attending: INTERNAL MEDICINE | Admitting: INTERNAL MEDICINE
Payer: COMMERCIAL

## 2022-05-16 ENCOUNTER — OFFICE VISIT (OUTPATIENT)
Dept: SURGERY | Facility: CLINIC | Age: 52
End: 2022-05-16
Payer: COMMERCIAL

## 2022-05-16 VITALS — SYSTOLIC BLOOD PRESSURE: 133 MMHG | HEART RATE: 72 BPM | DIASTOLIC BLOOD PRESSURE: 83 MMHG

## 2022-05-16 VITALS — WEIGHT: 293 LBS | BODY MASS INDEX: 41.95 KG/M2 | HEIGHT: 70 IN

## 2022-05-16 DIAGNOSIS — R07.9 CHEST PAIN, UNSPECIFIED TYPE: ICD-10-CM

## 2022-05-16 DIAGNOSIS — E66.01 MORBID OBESITY WITH BMI OF 45.0-49.9, ADULT (H): ICD-10-CM

## 2022-05-16 DIAGNOSIS — I20.89 EXERTIONAL ANGINA (H): ICD-10-CM

## 2022-05-16 DIAGNOSIS — R06.09 DYSPNEA ON EXERTION: ICD-10-CM

## 2022-05-16 DIAGNOSIS — I10 ESSENTIAL HYPERTENSION: Primary | ICD-10-CM

## 2022-05-16 DIAGNOSIS — R00.2 PALPITATIONS: ICD-10-CM

## 2022-05-16 PROCEDURE — 93246 EXT ECG>7D<15D RECORDING: CPT

## 2022-05-16 PROCEDURE — 93016 CV STRESS TEST SUPVJ ONLY: CPT | Performed by: INTERNAL MEDICINE

## 2022-05-16 PROCEDURE — 75563 CARD MRI W/STRESS IMG & DYE: CPT | Mod: 26 | Performed by: INTERNAL MEDICINE

## 2022-05-16 PROCEDURE — 93018 CV STRESS TEST I&R ONLY: CPT | Performed by: INTERNAL MEDICINE

## 2022-05-16 PROCEDURE — 255N000002 HC RX 255 OP 636: Performed by: INTERNAL MEDICINE

## 2022-05-16 PROCEDURE — 97803 MED NUTRITION INDIV SUBSEQ: CPT | Performed by: DIETITIAN, REGISTERED

## 2022-05-16 PROCEDURE — A9585 GADOBUTROL INJECTION: HCPCS | Performed by: INTERNAL MEDICINE

## 2022-05-16 PROCEDURE — 250N000011 HC RX IP 250 OP 636: Performed by: INTERNAL MEDICINE

## 2022-05-16 PROCEDURE — 999N000127 HC STATISTIC PERIPHERAL IV START W US GUIDANCE

## 2022-05-16 PROCEDURE — 93017 CV STRESS TEST TRACING ONLY: CPT

## 2022-05-16 PROCEDURE — 250N000013 HC RX MED GY IP 250 OP 250 PS 637: Performed by: INTERNAL MEDICINE

## 2022-05-16 PROCEDURE — 93248 EXT ECG>7D<15D REV&INTERPJ: CPT | Performed by: INTERNAL MEDICINE

## 2022-05-16 RX ORDER — ALBUTEROL SULFATE 90 UG/1
2 AEROSOL, METERED RESPIRATORY (INHALATION) EVERY 5 MIN PRN
Status: DISCONTINUED | OUTPATIENT
Start: 2022-05-16 | End: 2022-05-17 | Stop reason: HOSPADM

## 2022-05-16 RX ORDER — AMINOPHYLLINE 25 MG/ML
100 INJECTION, SOLUTION INTRAVENOUS ONCE
Status: DISCONTINUED | OUTPATIENT
Start: 2022-05-16 | End: 2022-05-17 | Stop reason: HOSPADM

## 2022-05-16 RX ORDER — ONDANSETRON 2 MG/ML
4 INJECTION INTRAMUSCULAR; INTRAVENOUS
Status: DISCONTINUED | OUTPATIENT
Start: 2022-05-16 | End: 2022-05-17 | Stop reason: HOSPADM

## 2022-05-16 RX ORDER — REGADENOSON 0.08 MG/ML
0.4 INJECTION, SOLUTION INTRAVENOUS ONCE
Status: COMPLETED | OUTPATIENT
Start: 2022-05-16 | End: 2022-05-16

## 2022-05-16 RX ORDER — DIAZEPAM 5 MG
5 TABLET ORAL EVERY 30 MIN PRN
Status: DISCONTINUED | OUTPATIENT
Start: 2022-05-16 | End: 2022-05-17 | Stop reason: HOSPADM

## 2022-05-16 RX ORDER — ACYCLOVIR 200 MG/1
0-1 CAPSULE ORAL
Status: DISCONTINUED | OUTPATIENT
Start: 2022-05-16 | End: 2022-05-17 | Stop reason: HOSPADM

## 2022-05-16 RX ORDER — DIPHENHYDRAMINE HYDROCHLORIDE 50 MG/ML
25-50 INJECTION INTRAMUSCULAR; INTRAVENOUS
Status: DISCONTINUED | OUTPATIENT
Start: 2022-05-16 | End: 2022-05-17 | Stop reason: HOSPADM

## 2022-05-16 RX ORDER — METHYLPREDNISOLONE SODIUM SUCCINATE 125 MG/2ML
125 INJECTION, POWDER, LYOPHILIZED, FOR SOLUTION INTRAMUSCULAR; INTRAVENOUS
Status: DISCONTINUED | OUTPATIENT
Start: 2022-05-16 | End: 2022-05-17 | Stop reason: HOSPADM

## 2022-05-16 RX ORDER — CAFFEINE CITRATE 20 MG/ML
60 SOLUTION INTRAVENOUS
Status: DISCONTINUED | OUTPATIENT
Start: 2022-05-16 | End: 2022-05-17 | Stop reason: HOSPADM

## 2022-05-16 RX ORDER — DIPHENHYDRAMINE HCL 25 MG
25 CAPSULE ORAL
Status: DISCONTINUED | OUTPATIENT
Start: 2022-05-16 | End: 2022-05-17 | Stop reason: HOSPADM

## 2022-05-16 RX ORDER — GADOBUTROL 604.72 MG/ML
38 INJECTION INTRAVENOUS ONCE
Status: COMPLETED | OUTPATIENT
Start: 2022-05-16 | End: 2022-05-16

## 2022-05-16 RX ADMIN — REGADENOSON 0.4 MG: 0.08 INJECTION, SOLUTION INTRAVENOUS at 14:25

## 2022-05-16 RX ADMIN — DIAZEPAM 5 MG: 5 TABLET ORAL at 12:31

## 2022-05-16 RX ADMIN — GADOBUTROL 38 ML: 604.72 INJECTION INTRAVENOUS at 15:32

## 2022-05-16 NOTE — PROGRESS NOTES
"PRE SURGICAL WEIGHT LOSS NUTRITION APPOINTMENT    Tiffani Brasher  1970  female  [id number]  51 year old    ASSESSMENT    Desired Surgical Procedure: gastric sleeve    REASON FOR VISIT:  Tiffani Brasher is a 51 year old year old female presents today for a pre-surgical weight loss follow-up appointment. Patient accompanied by self.    DIAGNOSIS:  Weight Status Obesity Grade III BMI >40    ANTHROPOMETRICS:  Height: 69.5\"    Initial Weight: 319 lbs     Weight last visit: 330 lbs    Current weight: 328 lbs 6.4 oz    BMI: Body mass index is 47.8 kg/m .    VITAMINS AND MINERALS:  [liquid] Multivitamin with Minerals  15,000 International units Vitamin D  Vitamin B12 sublingual  Additional Vitamin D and Vitamin K  Liquid Iron - Floridex       NUTRITION HISTORY:  Breakfast: [skips or several hours after waking] protein shake  orange juice + hard boiled egg  grits  Lunch: lentil soup  han chicken noodle soup w/vegetables  fish + vegetables  Supper: [skips] salad w/beans or lentils and hard boiled egg   Snacks: [evenings] yogurt, serina de braga (no chips), occasional chips, rarely leftovers, ramen noodles + mushrooms, hard boiled eggs  Fluids Consumed: Water (64-128oz), Ginger Ale (occasional mini cans), coconut water, juice (12oz throughout day), chamomile tea, wine (~2-3 bottles/month - 1 glass a few days/week)  Eating slower: Yes  Chewing foods thoroughly: Yes  Take 20-30 minutes to consume each meal: Yes  Fluids and meals separate by at least 30 minutes: Yes  Carbonation: yes  Caffeine: none  Additional Information: Pt with several positive changes over the last several months and significantly more focused on bariatric lifestyle changes. Notes that she is traveling to/from [place] to care for her Mother each month. Many appropriate questions today about long-term lifestyle expectations.     PHYSICAL ACTIVITY:  Inconsistent d/t ongoing hip pain. Recently received steroid injection and hopes to be able to " increase physical activity shortly.     DIAGNOSIS:  Previous Nutrition Diagnosis: Obesity related to long history of self- monitoring deficit and excessive energy intake evidenced by BMI of 48.03 kg/m2  No change, modified below    Previous goals:   (remote)    Current Nutrition Diagnosis: Obesity related to long history of self-monitoring deficit and excessive energy intake as evidenced by BMI of 47.8 kg/m2.    INTERVENTION:  Nutrition Prescription: Recommended energy/nutrient modification.    GOALS:  Start taking Calcium per guidelines  Eat 3 meals/day at regular intervals  Minimize snacking  Eliminate carbonation, caffeine, alcohol and caloric beverages  Increase exercise as able      Intervention:  - Discussed progress towards previous goals.  - Reinforced importance of making behavior changes in preparation for bariatric surgery.   - Assessed learning needs and learning preferences       NUTRITION MONITORING AND EVALUATION:  Anticipated compliance: fair-good  Patient demonstrated good understanding.     Follow up: Continue to monitor patient closely regarding weight loss and diet.  # of visits needed: 1-2  Cleared by RD: No     TIME SPENT WITH PATIENT: 35 minutes      Bridgett Torrez RD, LD  Clinical Dietitian

## 2022-05-16 NOTE — PATIENT INSTRUCTIONS
"Protein Supplements:    <250 calories  15-30g protein  <10g fat  <10g sugar     \"Premier Protein\"         GOALS:      Begin taking Calcium: 500mg 3x/day OR 600mg 2x/day. Take at least 2 hours apart from multivitamin and iron supplements.    Eat meals at regular intervals - try to eat your first meal within 1 hour of waking up, then eat every 4-6 hours    Minimize snacking    Eliminate carbonated, caffeinated and caloric beverages    Increase exercise as able        "

## 2022-05-17 ENCOUNTER — TELEPHONE (OUTPATIENT)
Dept: SURGERY | Facility: CLINIC | Age: 52
End: 2022-05-17
Payer: COMMERCIAL

## 2022-05-17 ENCOUNTER — DOCUMENTATION ONLY (OUTPATIENT)
Dept: SLEEP MEDICINE | Facility: CLINIC | Age: 52
End: 2022-05-17
Payer: COMMERCIAL

## 2022-05-17 ENCOUNTER — MYC MEDICAL ADVICE (OUTPATIENT)
Dept: SURGERY | Facility: CLINIC | Age: 52
End: 2022-05-17
Payer: COMMERCIAL

## 2022-05-17 NOTE — TELEPHONE ENCOUNTER
Pt called to alert clinic that  msg was sent with data from CPAP.    Said the data didn't upload to US due to in [facility] Fall '21.    Said she has met compliance and sent uploaded data from Sept - Oct 2021 to Bety Tran at [facility] 5/12/22.  Was wondering why this was not received by anyone yet.  Informed pt it has not been that long ago and with weekend - that may be why.    Also said the paper she signed with us said is she used it > 4 hours per night she was compliant.  Informed pt I was not aware of this document.  She later clarified it was at the DME store.     Noted  message from today in her EMR to Dr. Moya that has the uploaded data.    Pt is requesting Ashlyn DAMON from sleep call her and let her know if this is okay and why all of the mix up.  I can see in note that pt was non compliant 1/6/22 but maybe that was due to fail of upload in Fall '21.    Pt is requesting a follow up before the end of the week.    This was forwarded to Dr. Moya and Bennett Goltz for follow up.  Kimberly Mahan, MS, RD, RN

## 2022-05-17 NOTE — TELEPHONE ENCOUNTER
Called patient to inform her that per Dr. Carias will need CPAP use prior to surgery.  Patient stated that she was unable to upload data in Sept 2021 due to was in [place] work and upload didn't work.  However, patient sent email to Bety Tran with data on 5/12/22.  I can see Bety works in DME for [employer].    Patient states she will upload to  later today.  Will monitor for upload and get to provider.  Kimberly Mahan, MS, RD, RN

## 2022-05-19 ENCOUNTER — TELEPHONE (OUTPATIENT)
Dept: CARDIOLOGY | Facility: CLINIC | Age: 52
End: 2022-05-19

## 2022-05-19 ENCOUNTER — VIRTUAL VISIT (OUTPATIENT)
Dept: SURGERY | Facility: CLINIC | Age: 52
End: 2022-05-19
Payer: COMMERCIAL

## 2022-05-19 DIAGNOSIS — E66.01 MORBID OBESITY (H): Primary | ICD-10-CM

## 2022-05-19 NOTE — TELEPHONE ENCOUNTER
"Called patient to review Cardiac MRI done 5/16/22. Pt did not answer. Left a  for callback. Also sent a detailed message via [employer].    ----- Message from Kye Huston MD-----  \"Ervin Tidwell,  It appears Ms. Brasher \"no showed \" for coronary CTA,  was contacted and he arranged a regadenoson (Lexiscan ) cardiac MRI which was entirely normal. The test indicates that it is very unlikely she has CAD and should be safe for her bariatric surgery. If her symptoms would persist we could offer a coronary angiogram, but I do not believe that is necessary. I will CC her primary care provider and surgeon on this note. Please contact the patient and tell her the good news about her test results. We won't plan further cardiac testing or follow up unless the patient or her doctors specifically request. Thanks, \"    SUSAN Guerrero    "

## 2022-05-19 NOTE — PROGRESS NOTES
Video-Visit Details    Type of service:  Video Visit    Video Start Time (time video started): 11:45 AM    Video End Time (time video stopped): 12:40 PM    Originating Location (pt. Location): Home    Distant Location (provider location):  Home office     Mode of Communication:  Video Conference via [facility]    Physician has received verbal consent for a Video Visit from the patient? Yes    Tiffani has made quality changes in eating and lifestyle, but still needs to be more mindful about her eating. She will follow up with a list of activities and mindful eating strategies. She is also still quite nervous about the surgery. F41.1; F43.9; E66.01    Yosvany George, PhD

## 2022-05-19 NOTE — LETTER
5/19/2022         RE: Tiffani Brasher  [address]  [place] [address]        Dear Colleague,    Thank you for referring your patient, Tiffani Brasher, to the [facility] [place]. Please see a copy of my visit note below.    Video-Visit Details    Type of service:  Video Visit    Video Start Time (time video started): 11:45 AM    Video End Time (time video stopped): 12:40 PM    Originating Location (pt. Location): Home    Distant Location (provider location):  Home office     Mode of Communication:  Video Conference via [facility]    Physician has received verbal consent for a Video Visit from the patient? Yes    Tiffani has made quality changes in eating and lifestyle, but still needs to be more mindful about her eating. She will follow up with a list of activities and mindful eating strategies. She is also still quite nervous about the surgery. F41.1; F43.9; E66.01    Yosvany George, PhD            Again, thank you for allowing me to participate in the care of your patient.        Sincerely,        Yosvany George, PhD

## 2022-06-10 ENCOUNTER — TELEPHONE (OUTPATIENT)
Dept: CARDIOLOGY | Facility: CLINIC | Age: 52
End: 2022-06-10
Payer: COMMERCIAL

## 2022-06-13 ENCOUNTER — TRANSFERRED RECORDS (OUTPATIENT)
Dept: HEALTH INFORMATION MANAGEMENT | Facility: CLINIC | Age: 52
End: 2022-06-13

## 2022-06-13 ENCOUNTER — VIRTUAL VISIT (OUTPATIENT)
Dept: SURGERY | Facility: CLINIC | Age: 52
End: 2022-06-13
Payer: COMMERCIAL

## 2022-06-13 DIAGNOSIS — E66.01 MORBID OBESITY (H): Primary | ICD-10-CM

## 2022-06-13 NOTE — LETTER
6/13/2022         RE: Tiffani Brasher  [address]  [place] [address]        Dear Colleague,    Thank you for referring your patient, Tiffani Brasher, to the [facility]RICS [facility] [place]. Please see a copy of my visit note below.    Video-Visit Details    Type of service:  Video Visit    Video Start Time (time video started): 10:28 AM    Video End Time (time video stopped): 11:10 AM    Originating Location (pt. Location): Home    Distant Location (provider location):  Home office    Mode of Communication:  Video Conference via [facility]    Physician has received verbal consent for a Video Visit from the patient? Yes    Tiffani has made quality changes in eating and lifestyle and appears more mindful about her eating. She is still nervous about the surgery, but is 100% certain she wants to proceed. She is now ready to proceed with surgery. A report will be sent to the clinic. F41.1; F43.9; E66.01    Yosvany George, PhD            Again, thank you for allowing me to participate in the care of your patient.        Sincerely,        Yosvany George, PhD

## 2022-06-13 NOTE — PROGRESS NOTES
Video-Visit Details    Type of service:  Video Visit    Video Start Time (time video started): 10:28 AM    Video End Time (time video stopped): 11:10 AM    Originating Location (pt. Location): Home    Distant Location (provider location):  Home office    Mode of Communication:  Video Conference via [facility]    Physician has received verbal consent for a Video Visit from the patient? Yes    Tiffani has made quality changes in eating and lifestyle and appears more mindful about her eating. She is still nervous about the surgery, but is 100% certain she wants to proceed. She is now ready to proceed with surgery. A report will be sent to the clinic. F41.1; F43.9; E66.01    Yosvany George, PhD

## 2022-06-16 ENCOUNTER — VIRTUAL VISIT (OUTPATIENT)
Dept: SURGERY | Facility: CLINIC | Age: 52
End: 2022-06-16
Payer: COMMERCIAL

## 2022-06-16 VITALS — HEIGHT: 70 IN | BODY MASS INDEX: 41.95 KG/M2 | WEIGHT: 293 LBS

## 2022-06-16 DIAGNOSIS — E66.01 MORBID OBESITY WITH BMI OF 45.0-49.9, ADULT (H): ICD-10-CM

## 2022-06-16 PROCEDURE — 97803 MED NUTRITION INDIV SUBSEQ: CPT | Mod: 95 | Performed by: DIETITIAN, REGISTERED

## 2022-06-16 NOTE — PATIENT INSTRUCTIONS
Ervin Nixon!      Great seeing you again today. Congratulations on dietary clearance! Up until surgery, focus on the followin. Continue to take all pre-op vitamins/minerals  - Switch to a multivitamin meeting bariatric guidelines (at least 18mg Iron, 1mg Copper, 10mg Zinc and 2000 international unit(s) Vitamin A)  - Once out of current Calcium switch to a 600mg dose and take 2x/day  - Continue with at least 5000 international unit(s) of Vitamin D per day    Bariatric Specialty Vitamins:    BA Ultra Solo  Unjury Opurity  Bariatric Pal - One a Day  ProCare (45mg iron)      2. Continue to eat slowly and chew your food well    3. Continue to separate fluids and meals by 30 minutes    4. Remain caffeine/carbonation/alcohol-free    5. Consider a break from Googling bariatric information  - It's great to be informed, but this might help alleviate some anxiety    6. Explore protein drink options  - Premier Protein  - Fair Life milk protein shakes  - Clear protein drinks: Gatorade, Premier, Pro2Go, etc          Here's some information on post-op vitamins/minerals and diet advancement.     Supplements after Weight Loss Surgery  [email or web address]     Your Stage 1 Diet: Clear Liquids  [email or web address]     Your Stage 2 Diet: Low-fat Full Liquids  [email or web address]     Your Stage 3 Diet: Pureed Foods  [email or web address]     Pureed Food Recipes  [email or web address]    Your Stage 4 Diet: Soft Foods  [email or web address]    Your Stage 5 Diet: Regular Foods  [email or web address]            As discussed, if you'd like to continue regular visits up until surgery - please do! All visits can be scheduled via our call center at . Let me know if you have any questions or concerns that pop up. Otherwise, keep up the great work!         Bridgett Torrez, RD, LD  Clinical Dietitian

## 2022-06-16 NOTE — PROGRESS NOTES
"Tiffani is a 51 year old who is being evaluated via a billable video visit.      How would you like to obtain your AVS? MyChart  If the video visit is dropped, the invitation should be resent by: Text to cell phone: [phone]  Will anyone else be joining your video visit? No          Video-Visit Details    Video Start Time: 8:55am    Type of service:  Video Visit    Video End Time:9:31am    Originating Location (pt. Location): Home    Distant Location (provider location):  [facility] [place]     Platform used for Video Visit: [employer] App    PRE SURGICAL WEIGHT LOSS NUTRITION APPOINTMENT    Tiffani Brasher  1970  female  [id number]  51 year old    ASSESSMENT    Desired Surgical Procedure: gastric sleeve    REASON FOR VISIT:  Tiffani Brasher is a 51 year old year old female presents today for a pre-surgical weight loss follow-up appointment. Patient accompanied by self.    DIAGNOSIS:  Weight Status Obesity Grade III BMI >40    ANTHROPOMETRICS:  Height: 69.5\"    Initial Weight: 319 lbs     Weight last visit: 328.4 lbs    Current weight: 318 lbs 0 oz    BMI: Body mass index is 46.29 kg/m .    VITAMINS AND MINERALS:  [liquid] Multivitamin with Minerals (product does not meet bariatric guidelines) - daily  Vitamin B12 sublingual - daily  Additional Vitamin D and Vitamin K - daily  Liquid Iron - daily  Calcium - 1200mg 1x/day (separate from iron and MVT)  Vitamin B Complex (100mg Thiamine) - daily  50mg Zinc - daily        NUTRITION HISTORY:  Breakfast: 2 eggs+ 1 piece of toast + serina de braga   Lunch: salmon burger on lettuce + serina de braga  [place]rugu[place] salad + hummus  Supper: curried chickpeas   Snacks: rare   Fluids Consumed: water/enhanced water  Eating slower: Yes  Chewing foods thoroughly: Yes  Take 20-30 minutes to consume each meal: Yes  Fluids and meals separate by at least 30 minutes: Yes  Carbonation: none  Caffeine: none  Additional Information: Pt with excellent " "commitment to bariatric changes. Notes ongoing challenges of balancing Mother's weight gain needs (cancer treatment) amidst her own weight loss needs. Significant exploration of relationship with food today and expectations for post-op lifestyle. Challenged pt's goals of perfection and stressed the importance of a healthy relationship with food. Appropriate questions today about \"cleanses,\" which were answered. Pt cleared today as meeting nutritional benchmarks required for surgery, however will continue to meet regularly for ongoing accountability and exploration of nutrition-related expectations.     PHYSICAL ACTIVITY:  Type: walking  Frequency: 3 (days per week)  Duration: 20 (minutes)     DIAGNOSIS:  Previous Nutrition Diagnosis: Obesity related to long history of self- monitoring deficit and excessive energy intake evidenced by BMI of 47.8 kg/m2  No change, modified below    Previous goals:   Start taking Calcium per guidelines - partially met  Eat 3 meals/day at regular intervals - met  Minimize snacking - met  Eliminate carbonation, caffeine, alcohol and caloric beverages - met  Increase exercise as able - met    Current Nutrition Diagnosis: Obesity related to long history of self-monitoring deficit and excessive energy intake as evidenced by BMI of 46.29 kg/m2.    INTERVENTION:  Nutrition Prescription: Recommended energy/nutrient modification.    GOALS:  Continue to practice all pre-op guidelines  Take all pre-op vitamins/minerals per guidelines  Explore alternative options for protein drinks  Take a break from googling bariatric procedures    Intervention:  - Discussed progress towards previous goals.  - Reinforced importance of making behavior changes in preparation for bariatric surgery.   - Assessed learning needs and learning preferences       NUTRITION MONITORING AND EVALUATION:  Anticipated compliance: good  Patient demonstrated good understanding.   Patient has met pre bariatric surgery diet " requirements    Follow up: Continue to monitor patient closely regarding weight loss and diet.  # of visits needed: 0; but will continue for accountability   Cleared by RD: Yes     TIME SPENT WITH PATIENT: 36 minutes      Bridgett Torrez RD, LD  Clinical [facility]

## 2022-06-17 DIAGNOSIS — F41.1 GAD (GENERALIZED ANXIETY DISORDER): ICD-10-CM

## 2022-06-17 DIAGNOSIS — I10 ESSENTIAL HYPERTENSION: ICD-10-CM

## 2022-06-17 NOTE — TELEPHONE ENCOUNTER
Reason for Call:  Medication or medication refill:    Do you use a [facility]?  Name of the pharmacy and phone number for the current request:     [id number]  [place], [place] - [address] AT [facility] [facility] [address]    Name of the medication requested: sertraline (ZOLOFT) 100 MG tablet  &  propranolol ER (INDERAL LA) 80 MG 24 hr capsule   &  vitamin D3 (CHOLECALCIFEROL) 1.25 MG (12205 UT) capsule     Other request:     Can we leave a detailed message on this number? YES    Phone number patient can be reached at: Cell number on file:    Telephone Information:   Mobile [phone]     Best Time: any    Call taken on 6/17/2022 at 2:13 PM by Lora Meneses

## 2022-06-20 RX ORDER — SERTRALINE HYDROCHLORIDE 100 MG/1
150 TABLET, FILM COATED ORAL DAILY
Qty: 45 TABLET | Refills: 2 | Status: SHIPPED | OUTPATIENT
Start: 2022-06-20 | End: 2023-01-27

## 2022-06-20 RX ORDER — PROPRANOLOL HYDROCHLORIDE 80 MG/1
80 CAPSULE, EXTENDED RELEASE ORAL DAILY
Qty: 90 CAPSULE | Refills: 0 | Status: SHIPPED | OUTPATIENT
Start: 2022-06-20 | End: 2023-01-27

## 2022-06-23 ENCOUNTER — TRANSFERRED RECORDS (OUTPATIENT)
Dept: HEALTH INFORMATION MANAGEMENT | Facility: CLINIC | Age: 52
End: 2022-06-23

## 2022-06-29 ENCOUNTER — VIRTUAL VISIT (OUTPATIENT)
Dept: SLEEP MEDICINE | Facility: CLINIC | Age: 52
End: 2022-06-29
Payer: COMMERCIAL

## 2022-06-29 VITALS — BODY MASS INDEX: 41.95 KG/M2 | HEIGHT: 70 IN | WEIGHT: 293 LBS

## 2022-06-29 DIAGNOSIS — G47.33 OSA (OBSTRUCTIVE SLEEP APNEA): Primary | ICD-10-CM

## 2022-06-29 DIAGNOSIS — G47.21 DELAYED SLEEP PHASE SYNDROME: ICD-10-CM

## 2022-06-29 PROCEDURE — 99215 OFFICE O/P EST HI 40 MIN: CPT | Mod: 95 | Performed by: PHYSICIAN ASSISTANT

## 2022-06-29 ASSESSMENT — SLEEP AND FATIGUE QUESTIONNAIRES
HOW LIKELY ARE YOU TO NOD OFF OR FALL ASLEEP WHILE SITTING INACTIVE IN A PUBLIC PLACE: WOULD NEVER DOZE
HOW LIKELY ARE YOU TO NOD OFF OR FALL ASLEEP WHEN YOU ARE A PASSENGER IN A CAR FOR AN HOUR WITHOUT A BREAK: SLIGHT CHANCE OF DOZING
HOW LIKELY ARE YOU TO NOD OFF OR FALL ASLEEP WHILE SITTING AND TALKING TO SOMEONE: WOULD NEVER DOZE
HOW LIKELY ARE YOU TO NOD OFF OR FALL ASLEEP IN A CAR, WHILE STOPPED FOR A FEW MINUTES IN TRAFFIC: WOULD NEVER DOZE
HOW LIKELY ARE YOU TO NOD OFF OR FALL ASLEEP WHILE SITTING AND READING: MODERATE CHANCE OF DOZING
HOW LIKELY ARE YOU TO NOD OFF OR FALL ASLEEP WHILE SITTING QUIETLY AFTER LUNCH WITHOUT ALCOHOL: MODERATE CHANCE OF DOZING
HOW LIKELY ARE YOU TO NOD OFF OR FALL ASLEEP WHILE WATCHING TV: WOULD NEVER DOZE
HOW LIKELY ARE YOU TO NOD OFF OR FALL ASLEEP WHILE LYING DOWN TO REST IN THE AFTERNOON WHEN CIRCUMSTANCES PERMIT: MODERATE CHANCE OF DOZING

## 2022-06-29 ASSESSMENT — PAIN SCALES - GENERAL: PAINLEVEL: SEVERE PAIN (6)

## 2022-06-29 NOTE — PROGRESS NOTES
Tiffani is a 51 year old who is being evaluated via a billable video visit.      How would you like to obtain your AVS? MyChart  If the video visit is dropped, the invitation should be resent by: Text to cell phone: [phone]  Will anyone else be joining your video visit? Chika Negrete        Video-Visit Details    Video Start Time: 4:20 PM    Type of service:  Video Visit    Video End Time:4:54 PM    Originating Location (pt. Location): Home    Distant Location (provider location):  [facility]     Platform used for Video Visit: [employer]

## 2022-06-29 NOTE — PROGRESS NOTES
CPAP Follow-Up Visit:    Date on this visit: 6/29/2022    Tiffani Brasher has a follow-up visit today to review her CPAP use for MAIN.  She was initially seen for difficulty falling asleep and staying asleep, much worse in the last year. Her medical history is significant for morbid obesity, depression/anxiety. She is considering bariatric surgery. Her blood pressure has been up as well.      PSG 3/29/21: AHI was 25/hr (0.6% centrals), with desaturations down to 80%. She spent 6.2 minutes below 90% SpO2 and 3.8 minutes below 89% SpO2. RDI 32/hr.  REM AHI and RDI 77.8/hr(18.5 minutes in REM).  Supine .9/hr (AHI 99.7/hr) (62 min supine). Her non-supine AHI was 11.8/hr, RDI 15.9/hr.  Periodic Limb Movement Index 3.8/hour, 1.5/hr associated with arousals.       She continues to have problems falling asleep. She is out of hydroxyzine and mirtazapine. She does not really like them as sleep medication.    PAP machine: ResMed (9/1/21). Pressure settings: 6-20 cm      The compliance data shows that the patient used the CPAP for 30/30 nights, 100% of nights for >4 hours.  The 95th% pressure is 14.3 cm.  The 95th% leak is 8.7 lpm.  The average nightly usage is 8:04.  The average AHI is 2.3/hr.       Interface:  Mask: p30i mask. She has not replaced supplies.   Chin strap: No  Leak: Occasionally. Mostly what she notices is air escaping out of the vents. She may have some if she rolls a lot.  Using Humidifier: Yes  Condensation in hose or mask: No     Difficulties with therapy:    [-] Snoring with CPAP:  [-] Difficulty tolerating the pressure:  [-] Epistaxis/dry nose:   [-] Nasal congestion:  [+] Dry mouth:  [-] Mouth breathing:   [-] Pain/skin breakdown:      Improvements noted with CPAP:   [+] waking up more refreshed  [+] sleeping better with less arousals  [+] improved energy level during the day    Weight change since sleep study: 317 lbs up from her sleep study (294#)    Bedtime: 11 PM.  Wake time: 9:30-10 AM.  Falls asleep around 2:30-3 AM. She has been staying asleep well since being on CPAP.  Naps:  Just was napping for 20 minutes before this visit and she did yesterday, but otherwise she has not been napping.  She gets tired right around 3:30-4 PM. It lasts 2-3 hours.    Hydroxyzine helps her fall asleep but she still wakes often. Mirtazapine does not help her fall asleep but helps her stay asleep. She was on Rozerem 8 mg as well.  Her sister gave her zolpidem which worked well but she had hang over grogginess.  She has tried liquid magnesium (didn't help), THC gummies (did help but she does not want to take a lot of that).    She is pursuing weight loss surgery, but is working on last minute nerves.     Past medical/surgical history, family history, social history, medications and allergies were reviewed.      Problem List:  Patient Active Problem List    Diagnosis Date Noted     MAIN (obstructive sleep apnea) 04/13/2021     Priority: Medium     Intertrigo 12/02/2020     Priority: Medium     Insomnia, unspecified type 11/20/2020     Priority: Medium     Essential hypertension 11/20/2020     Priority: Medium     Morbid obesity with BMI of 45.0-49.9, adult (H) 04/11/2019     Priority: Medium     Anemia, iron deficiency 04/09/2019     Priority: Medium     PTSD (post-traumatic stress disorder) 03/01/2019     Priority: Medium     JAXON (generalized anxiety disorder) 10/08/2018     Priority: Medium     Severe episode of recurrent major depressive disorder, without psychotic features (H) 10/08/2018     Priority: Medium        Impression/Plan:    (G47.33) MAIN (obstructive sleep apnea)  (primary encounter diagnosis)  Comment: Tiffani is now meeting compliance and does sleep better with CPAP. Her apnea appears well controlled. She has some dry nose. She is wearing the mask a lot while awake. She also had some concerns about mask leak, but the download indicated the leak is low. She is likely feeling air from the exhaust vents.    Plan: Continue auto CPAP 6-20 cm. A prescription was written for new supplies. We did not discuss management of dry nose. We spent most of the time addressing her issue below. She is cleared for weight loss surgery from a sleep standpoint. Bring your CPAP with you to the surgery so it can be available postoperatively.      (G47.21) Delayed sleep phase syndrome  Comment: She is getting into bed sometimes as early as 8 PM, sometimes closer to 11 PM. She is not falling asleep until 2-3 AM. She often gets about 8 hours of CPAP use (removing it around 4-6 AM) and then going back to sleep until 9:30-10 AM. She has tried hydroxyzine, mirtazapine and Rozerem. She would like to see what progress she can make without medication.   Plan: Limit time in bed to no more than 8 hours. Start with a sleep schedule of 1-9 AM. Every 3-4 days, move your bedtime and wake time earlier by 10-15 minutes if desired, until you reach your target sleep schedule. We discussed getting 30 minutes of bright light exposure in the morning, either with the sun or a SAD Lamp of 10,000 lux intensity. Avoid bright light, including electronics. Avoid naps and sleeping in.       She will follow up with me in about 3 month(s).     40 minutes were spent on the date of the encounter doing chart review, history and exam, documentation and further activities as noted above.     Bennett Goltz, PA-C    CC: Megan Phillips MD

## 2022-06-29 NOTE — PATIENT INSTRUCTIONS
Instructions for treating Delayed Sleep Phase Syndrome:    Limit time in bed to no more than 8 hours. Start with a sleep schedule of 1-9 AM. Every 3-4 days, move your bedtime and wake time earlier by 10-15 minutes if desired, until you reach your target sleep schedule    Delayed Sleep Phase Syndrome (DSPS) means that your body's internal timing is set late compared to the 24 hour day. Therefore, it is often difficult to get up on time for work in the morning and sometimes difficult to fall asleep on time, in order to get enough sleep. People with DSPS often tend to like to stay up late on weekends and sleep in until between 10 AM and noon, sometimes even later.This is actually a bad habit that will perpetuate the problem. It reinforces your body's tendency to be on that later schedule.    You should go to bed when you are sleepy and ready to sleep. During this entire process, you should not engage in activities that may make it worse, such as watching TV in bed, leaving the TV on all night, drinking any caffeine 6 hours before bed or exercising 1-2 hours before bed.     Start taking Melatonin, 1 mg tablet 3-5 hours before the time that you fall asleep on average (not your desired bedtime or time that you get in bed, but the time you normally fall asleep on your own).     Upon awakening, get exposure to sun-light for about 30-45 minutes. You do not need to look at the sun, in fact, this is dangerous. Reading the paper with the sun shining on you is adequate.  Alternatively, you may use a Seasonal Affective Disorder Lamp (intensity 10,000 Lux) instead of the sun. The lamp should be positioned 1-2 arms lengths away from you. They lamps are sold at Home Medical Companies such as [employer] or [employer]. A prescription can be written to get insurance coverage in some cases. They are also sold on [employer].    Using the light and melatonin should help march your internal clock (known as your circadian  rhythms) gradually earlier. As your bedtime advances, remember to take your melatonin earlier, keeping it 5 hours before your fall asleep time.    Avoid naps and sleeping in because sleeping during the day will delay your body's clock and you will have to start from scratch.     More information about light therapy:  If the cost of any light box is too much, you can also purchase a compact fluorescent all spectrum light bulb at a local hardware store for about $8.  The most commonly available bulb is 1400 lumen.  You would need two of these positioned within 1 meter of yourself to be equivalent to 2,500 lux.  The bulbs can be placed in a standard light fixture.  Additionally, they can be placed in a mountable fixture that is used in greenhouses.  Mountable fixtures are also available at hardware stores for about $9.  Do not look directly at the light.  If you have any concerns regarding the safety of bright light therapy for you, it is recommended that you consult an ophthalmologist before using a light box.  If you have a condition that makes your eyes very sensitive to light, macular degeneration, a family history of such problems, or diabetic changes to your eyes, consult an ophthalmologist before using a light box. If you have anxiety disorder and have an increase in anxiety discontinue use.

## 2022-07-01 ENCOUNTER — VIRTUAL VISIT (OUTPATIENT)
Dept: FAMILY MEDICINE | Facility: CLINIC | Age: 52
End: 2022-07-01
Payer: COMMERCIAL

## 2022-07-01 DIAGNOSIS — E55.9 VITAMIN D DEFICIENCY: Primary | ICD-10-CM

## 2022-07-01 DIAGNOSIS — L30.4 INTERTRIGO: ICD-10-CM

## 2022-07-01 DIAGNOSIS — F33.2 SEVERE EPISODE OF RECURRENT MAJOR DEPRESSIVE DISORDER, WITHOUT PSYCHOTIC FEATURES (H): ICD-10-CM

## 2022-07-01 DIAGNOSIS — F41.1 GAD (GENERALIZED ANXIETY DISORDER): ICD-10-CM

## 2022-07-01 DIAGNOSIS — I10 ESSENTIAL HYPERTENSION: ICD-10-CM

## 2022-07-01 DIAGNOSIS — E66.01 MORBID OBESITY WITH BMI OF 45.0-49.9, ADULT (H): ICD-10-CM

## 2022-07-01 DIAGNOSIS — G47.33 OSA (OBSTRUCTIVE SLEEP APNEA): ICD-10-CM

## 2022-07-01 PROCEDURE — 99214 OFFICE O/P EST MOD 30 MIN: CPT | Mod: 95 | Performed by: INTERNAL MEDICINE

## 2022-07-01 RX ORDER — ERGOCALCIFEROL 1.25 MG/1
50000 CAPSULE, LIQUID FILLED ORAL WEEKLY
Qty: 12 CAPSULE | Refills: 0 | Status: SHIPPED | OUTPATIENT
Start: 2022-07-01 | End: 2022-10-18

## 2022-07-01 NOTE — PROGRESS NOTES
Tiffani is a 51 year old who is being evaluated via a billable video visit.      How would you like to obtain your AVS? Cristi  If the video visit is dropped, the invitation should be resent by: Text to cell phone: CRISTI  Will anyone else be joining your video visit? No          Assessment & Plan   Problem List Items Addressed This Visit        Respiratory    MAIN (obstructive sleep apnea)       Digestive    Morbid obesity with BMI of 45.0-49.9, adult (H)    Relevant Medications    Calcium Carb-Cholecalciferol (CALCIUM 600/VITAMIN D) 600-400 MG-UNIT CHEW    vitamin D2 (ERGOCALCIFEROL) 74608 units (1250 mcg) capsule       Circulatory    Essential hypertension       Musculoskeletal and Integumentary    Intertrigo       Behavioral    JAXON (generalized anxiety disorder)    Severe episode of recurrent major depressive disorder, without psychotic features (H)      Other Visit Diagnoses     Vitamin D deficiency    -  Primary    Relevant Medications    vitamin D2 (ERGOCALCIFEROL) 64457 units (1250 mcg) capsule         Advised patient I am in favor of of her proceeding with bariatric surgery ,she reports that she was cleared by psych and cardiology.  She continues to adopt lifestyle changes, lost weight down to 311, goal was 315 pounds she is trying to lose more weight.  Compliant with her CPAP.  Compliant with rest of medication.  She is not using Remeron or hydroxyzine or lorazepam.  She continues on sertraline daily and on propranolol.  Her headaches seem to have been quiet for some time.  She has right hip pain, she sees sports medicine, she had a steroid injection that seems to be wearing off she will follow-up with them.  She does not have much confidence in doctors, and so would like my opinion;  so I encouraged her to proceed forward with the bariatric sleeve gastrectomy as long as she is cleared by cardiology.  That will help benefit down the road for morbidity and mortality.  She seems very enthusiastic about  surgery and she wants her opinion and to keep us in the loop.   patient is not dependent on benzodiazepine and or Remeron advised these may affect and make  her sleep apnea worse.  Will continue to refill her psych medications until she establishes with psychiatry  All questions answered reviewed her labs from March 2022.           Return if symptoms worsen or fail to improve, for As needed and if symptoms worsen.    Megan Phillips MD  [facility] ELA Nixon is a 51 year old presenting for the following health issues:  Follow Up (Bariatric progress - discuss next steps)      HPI     Hanging on there..doing well...wanted to touch base, moving closer towards bariatric surgery, [place], cleared by cardiac and nutrition, and psych, yesterday cleared by sleep medicine,   Asked her to get mammogram    And then go with scheduling    Uses CPAP EVERY Night 100% usage for last 44 days and 65% over last 90 days    LOOKING FOR SLEEVE not comfortable with lot of doctors, feel comfortable with doctors in wt program expectations are realistic , wants to be healthier, afraid of loose skin, has skin rashes intertiradal    Dr Hernandez advised holter  MRI stress test    Hoping on the other end of it sleep apnea and BP will come down    Panic and anxiety has gone down,     Advised will refill her psych Meds till she established with psychiatry    Not taking Rozerem     Has not refilled Rozerem or hydroxyzine , does not need help with sleep for now, anita, sent her note , meet every other month, MT, she does not need hydroxyzine, has not used lorazepam, takes vit D Sertraline an propranolol and statin,     Propranolol  LA, she takes for HTN, has not had any migraine,   ;Not making her dizzy     Has tried 6 mo got to number down 315 lbs, now is down to 311 lbs, wants to try down even further       Seeing DR CARTWRIGHT, FOUND A TEAR IN RIGHT HIP LABRUM, SO THEY GAVE HER CORTICOSTEROID SHOT TO DEAL WITH  PAIN/DISCOMFORT, REACHED OUT TO DR CARTWRIGHT, SHOT IS WEARING OFF, FEELS PRESSURE, ON LABRUM AND FEELS NUMBNESS DOWN THE LEG, LAST WEEK, ONE DAY WAS SITTING AT EDGE OF BATHTUB FELT HIP OUT OF SOCET WAS SHARP PAIN, WANT HER TO START WITH SHOTS, AND PUT ORDER FOR PT, WANTED HER TO RACK, PUT CHAT MESSAGE,     Will try to get into another one calender for  Psychiatry , else she will let me know.   ,       Review of Systems   Constitutional, HEENT, cardiovascular, pulmonary, gi and gu systems are negative, except as otherwise noted.      Objective           Vitals:  No vitals were obtained today due to virtual visit.    Physical Exam   GENERAL: Healthy, alert and no distress  EYES: Eyes grossly normal to inspection.  No discharge or erythema, or obvious scleral/conjunctival abnormalities.  RESP: No audible wheeze, cough, or visible cyanosis.  No visible retractions or increased work of breathing.    SKIN: Visible skin clear. No significant rash, abnormal pigmentation or lesions.  NEURO: Cranial nerves grossly intact.  Mentation and speech appropriate for age.  PSYCH: Mentation appears normal, affect normal/bright, judgement and insight intact, normal speech and appearance well-groomed.    Lab on 03/21/2022   Component Date Value Ref Range Status     Occult Blood Screen FIT 03/24/2022 Negative  Negative Final     Vitamin D, Total (25-Hydroxy) 03/21/2022 17 (A) 20 - 75 ug/L Final     Cholesterol 03/21/2022 280 (A) <200 mg/dL Final     Triglycerides 03/21/2022 208 (A) <150 mg/dL Final     Direct Measure HDL 03/21/2022 61  >=50 mg/dL Final     LDL Cholesterol Calculated 03/21/2022 177 (A) <=100 mg/dL Final     Non HDL Cholesterol 03/21/2022 219 (A) <130 mg/dL Final     Patient Fasting > 8hrs? 03/21/2022 No   Final     Hepatitis C Antibody 03/21/2022 Nonreactive  Nonreactive Final               Video-Visit Details    Video Start Time: 2:35 PM    Type of service:  Video Visit    Video End Time:2:57 PM  Total time spent was 40  minutes review of records and exam.     Originating Location (pt. Location): Home    Distant Location (provider location):  [facility]     Platform used for Video Visit: Kenna Machado

## 2022-07-16 ENCOUNTER — HEALTH MAINTENANCE LETTER (OUTPATIENT)
Age: 52
End: 2022-07-16

## 2022-07-18 ENCOUNTER — VIRTUAL VISIT (OUTPATIENT)
Dept: PHARMACY | Facility: CLINIC | Age: 52
End: 2022-07-18
Payer: COMMERCIAL

## 2022-07-18 DIAGNOSIS — R07.9 CHEST PAIN, UNSPECIFIED TYPE: ICD-10-CM

## 2022-07-18 DIAGNOSIS — G47.00 INSOMNIA, UNSPECIFIED TYPE: ICD-10-CM

## 2022-07-18 DIAGNOSIS — E55.9 VITAMIN D DEFICIENCY: ICD-10-CM

## 2022-07-18 DIAGNOSIS — E66.01 MORBID OBESITY WITH BMI OF 45.0-49.9, ADULT (H): ICD-10-CM

## 2022-07-18 DIAGNOSIS — F43.10 PTSD (POST-TRAUMATIC STRESS DISORDER): ICD-10-CM

## 2022-07-18 DIAGNOSIS — F33.2 SEVERE EPISODE OF RECURRENT MAJOR DEPRESSIVE DISORDER, WITHOUT PSYCHOTIC FEATURES (H): Primary | ICD-10-CM

## 2022-07-18 DIAGNOSIS — F41.1 GAD (GENERALIZED ANXIETY DISORDER): ICD-10-CM

## 2022-07-18 DIAGNOSIS — I10 ESSENTIAL HYPERTENSION: ICD-10-CM

## 2022-07-18 DIAGNOSIS — E78.5 HYPERLIPIDEMIA LDL GOAL <100: ICD-10-CM

## 2022-07-18 PROCEDURE — 99605 MTMS BY PHARM NP 15 MIN: CPT | Performed by: PHARMACIST

## 2022-07-18 PROCEDURE — 99607 MTMS BY PHARM ADDL 15 MIN: CPT | Performed by: PHARMACIST

## 2022-07-18 RX ORDER — ALPRAZOLAM 0.25 MG
0.25 TABLET ORAL DAILY PRN
Qty: 15 TABLET | Refills: 0 | OUTPATIENT
Start: 2022-07-18

## 2022-07-18 NOTE — PROGRESS NOTES
Medication Therapy Management (MTM) Encounter    ASSESSMENT:                            Medication Adherence/Access: No issues identified    Depression/Anxiety/PTSD: Alprazolam has a longer half life, but faster onset of action.  I agree it may be reasonable to make this change, will discuss with primary care physician.    Insomnia: Stable.    Chest Pain: Stable.    Hypertension: Stable. Patient is meeting blood pressure goal of < 140/90mmHg.     Hyperlipidemia: Stable.  Patient is on high intensity statin which is indicated based on 2019 ACC/AHA guidelines for lipid management.       Obesity:  Plan in place.    Supplements:  Stable.    PLAN:                            1.  I will send a note to Dr. Phillips about changing lorazepam to alprazolam.    Follow-up: Return in about 2 days (around 7/20/2022) for check in via [employer].    SUBJECTIVE/OBJECTIVE:                          Tiffani Brasher is a 51 year old female contacted via secure video for a follow-up visit.  Today's visit is a follow-up MTM visit from 3/21/2022.     Reason for visit: Routine follow-up.    Tobacco: She reports that she has never smoked. She has never used smokeless tobacco.    Medication Adherence/Access: no issues reported    Depression/Anxiety/PTSD: Tiffani continues taking sertraline 150 mg daily.  She reports symptoms have been stable.  She continues seeing her therapist regularly.  She has lorazepam available for use, only uses rarely.  She again asks about changing lorazepam to alprazolam - when she's taken 0.25mg of alprazolam in the past, she felt the onset was faster than with lorazepam.  She denies suicidal ideation.  Overall she feels she's needing to use benzo therapy less and less, which she's happy about.    Insomnia: Currently taking rozerem 8 mg (rarely, helps her stay asleep) and hydroxyzine 25 mg as needed (rarely, helps her fall asleep).  She reports these are effective when needed, never uses both on the same night.    Chest  "Pain:  She continues taking aspirin 81mg daily and denies signs and symptoms of bruising/bleeding.      Hypertension: Current medications include propranolol ER 80 mg. Patient reports no current medication side effects. At the time when this was started (June 2020), she had also been having migraines so the intent was for this to help with both issues.    BP Readings from Last 3 Encounters:   05/16/22 133/83   05/13/22 99/63   05/12/22 (!) 140/100        Hyperlipidemia: Current therapy includes atorvastatin 20mg daily.  She denies side effects of therapy.  The 10-year ASCVD risk score (Mainorkathy OROZCO JrShayna, et al., 2013) is: 2.7%    Values used to calculate the score:      Age: 51 years      Sex: Female      Is Non- : No      Diabetic: No      Tobacco smoker: No      Systolic Blood Pressure: 133 mmHg      Is BP treated: Yes      HDL Cholesterol: 61 mg/dL      Total Cholesterol: 280 mg/dL     Recent Labs   Lab Test 03/21/22  1224 08/30/21  1559   CHOL 280* 280*   HDL 61 69   * 176*   TRIG 208* 177*     Obesity: Patient is working with the weight loss clinic and is working toward bariatric surgery.  She says \"it's starting to sink in that it could happen real soon.\"  She wonders about medication implications.    Supplements:  Tiffani has been taking calcium/vitamin D twice daily a daily multivitamin and liquid iron occasionally.  She's back on high dose Vitamin D weekly as well.  No side effects reported.  Vitamin D Deficiency Screening Results:  Lab Results   Component Value Date    VITDT 17 (L) 03/21/2022    VITDT 24 08/30/2021    VITDT 8 (L) 03/30/2021     Today's Vitals: LMP 04/04/2019   ----------------    I spent 23 minutes with this patient today. I offer these suggestions for consideration by Dr. Phillips. A copy of the visit note was provided to the patient's provider(s).    The patient was sent via [employer] a summary of these recommendations.     Gem Moran, PharmD, BCACP  Medication " Therapy Management Provider  Pager: [phone]     Telemedicine Visit Details  Type of service:  Telephone visit  Start Time: 11:35 AM  End Time: 11:58 AM  Originating Location (patient location): Home  Distant Location (provider location):  [facility]     Medication Therapy Recommendations  JAXON (generalized anxiety disorder)    Current Medication: LORazepam (ATIVAN) 0.5 MG tablet   Rationale: More effective medication available - Ineffective medication - Effectiveness   Recommendation: Change Medication - ALPRAZolam 0.25 MG tablet   Status: Contact Provider - Awaiting Response

## 2022-07-18 NOTE — PATIENT INSTRUCTIONS
"Recommendations from today's MTM visit:                                                    MTM (medication therapy management) is a service provided by a clinical pharmacist designed to help you get the most of out of your medicines.   Today we reviewed what your medicines are for, how to know if they are working, that your medicines are safe and how to make your medicine regimen as easy as possible.      I sent Dr. Phillips a message about changing lorazepam to alprazolam.  I'll let you know what he says.    Follow-up: Return in about 2 days (around 7/20/2022) for check in via [employer].    It was great speaking with you today.  I value your experience and would be very thankful for your time in providing feedback in our clinic survey. In the next few days, you may receive an email or text message from [employer] with a link to a survey related to your  clinical pharmacist.\"     To schedule another MTM appointment, please call the clinic directly or you may call the MTM scheduling line at [phone] or toll-free at [phone].     My Clinical Pharmacist's contact information:                                                      Please feel free to contact me with any questions or concerns you have.      Gem Moran, [place], [facility]  Medication Therapy Management Provider  Pager: [phone]    "

## 2022-07-18 NOTE — TELEPHONE ENCOUNTER
Respectfully, if this patient would like to switch from lorazepam to alprazolam, I recommend a virtual visit with her PCP when he returns to clinic to discuss  I am covering for her PCP who is out of the clinic this week  Can you help her schedule that appointment with Dr. Phillips  ?

## 2022-07-18 NOTE — TELEPHONE ENCOUNTER
Patient is currently taking lorazepam 0.5mg daily as needed, which she uses rarely for anxiety attacks.  She's currently out and is requesting a refill.  She has taken alprazolam 0.25mg in the past and found that it had a faster onset than lorazepam seems to.  She wonders about changing Rx from lorazepam to alprazolam.      Per [employer] -   Lorazepam: Time to peak - 2 hours (range 0.5 to 3 hours); half life 11-12 hours  Alprazolam: Time to peak 1-2 hours; Half life 11 hours but extended in obesity    I generally recommend avoiding alprazolam due to the longer half life, particularly in obesity, but given that patient has tried both, didn't notice a difference in duration but did notice a difference in onset, I would be ok with her switching if Dr. Phillips is in agreement.  Rx pended for alprazolam 0.25mg to replace lorazepam 0.5mg.  Routing to primary care physician for consideration; if not in agreement with change, patient requests refill of lorazepam.    Gem Moran, PharmD, BCACP  Medication Therapy Management Provider  Pager: [phone]

## 2022-07-19 RX ORDER — ALPRAZOLAM 0.25 MG
TABLET ORAL
Qty: 15 TABLET | Refills: 0 | Status: CANCELLED | OUTPATIENT
Start: 2022-07-19

## 2022-07-19 NOTE — TELEPHONE ENCOUNTER
Pt states she had a virtual visit 7/1/22 with PCP to discuss this already     PCP was okay with her switching medication but first wanted her to consult with MTM (Gem) and make sure she thought it was okay     Please advise - or does this need to wait until PCP returns next week?     Tika KRAMER, Triage RN  [facility]       Refused Prescriptions     ALPRAZolam (XANAX) 0.25 MG tablet         Sig: Take 1 tablet (0.25 mg) by mouth daily as needed for anxiety do not take with Remeron, do not drive, drink alcohol or use fine machinery on this medicine    Disp:  15 tablet    Refills:  0    Start: 7/18/2022    Class: E-Prescribe    Refused by: Sam Barry MD    Refusal reason: OTHER    To pharmacy: To replace lorazepam        Fill requested from: [id number] - [place] [id number] AT [address] [place] [address]

## 2022-07-19 NOTE — TELEPHONE ENCOUNTER
Dr Phillips out of office this week. This rx request will need to wait until he is back in office next week for his review.

## 2022-07-20 ENCOUNTER — VIRTUAL VISIT (OUTPATIENT)
Dept: ORTHOPEDICS | Facility: CLINIC | Age: 52
End: 2022-07-20
Payer: COMMERCIAL

## 2022-07-20 ENCOUNTER — TELEPHONE (OUTPATIENT)
Dept: ORTHOPEDICS | Facility: CLINIC | Age: 52
End: 2022-07-20

## 2022-07-20 DIAGNOSIS — M25.551 RIGHT HIP PAIN: ICD-10-CM

## 2022-07-20 DIAGNOSIS — M51.369 LUMBAR DEGENERATIVE DISC DISEASE: Primary | ICD-10-CM

## 2022-07-20 PROCEDURE — 99213 OFFICE O/P EST LOW 20 MIN: CPT | Mod: 95 | Performed by: FAMILY MEDICINE

## 2022-07-20 NOTE — TELEPHONE ENCOUNTER
Again, if Mrs. Brasher wants to switch medicine from ativan to xanax, that would require a discussion with PCP to make sure he is OK with that.  If she needs a refill on her usual lorazepam and it is the appropriate time interval, we can help with that so that she does not run out before Dr. Jacqueline sanchez

## 2022-07-20 NOTE — TELEPHONE ENCOUNTER
Dr. Dee was able to connect with patient for virtual visit. Closing encounter.     Haylee Gentile MSA, ATC  Certified Athletic Trainer

## 2022-07-20 NOTE — PROGRESS NOTES
"ESTABLISHED PATIENT FOLLOW-UP VIRTUAL:  Follow up Right hip    This encounter was conducted via telephone in lieu of face-to-face encounter due to precautions implemented during COVID-19 pandemic.     HISTORY OF PRESENT ILLNESS  Ms. Brasher is a 51 year old year old female who presents virtually today for follow-up of right hip pain and low back pain    Date of injury:   Date last seen: 3/17/22  Following Therapeutic Plan: Yes  Pain: Improved somewhat then returned at right hip.    Function: Unchanged  Interval History: Tiffani notes ongoing pain right hip since fall 9/17/22.  She has noted pain lateral hip, pain wraps to groin. Tingling in region.  Tingling and pain shoots down right lower extremity.  Known achilles tendinitis with pain right lower leg.  More recent left calf and achilles region pain.       Still has episodes of hip feeling like \"it is dislocating\". Few recent episodes and last appx 15 seconds of severe pain.  Last time occurred standing from seated position on ledge of bath tub.    Has not completed PT  Planning for bariatric surgery and notes she is  Hopeful that her MSK complaints will subside with surgery.    Additional medical/Social/Surgical histories reviewed in [facility] and updated as appropriate.    REVIEW OF SYSTEMS (7/20/2022)  CONSTITUTIONAL: Denies fever and weight loss  GASTROINTESTINAL: Denies abdominal pain, nausea, vomiting  MUSCULOSKELETAL: See HPI  SKIN: Denies any recent rash or lesion  NEUROLOGICAL: Denies numbness or focal weakness    PHYSICAL EXAMINATION  General Appearance: Well appearing, alert, in no acute distress, well-hydrated, and well nourished  ENT: Pupils equal, round, no conjunctival injection.  No lid lag  Cardiovascular: no signs of upper extremity edema  Respiratory: no respiratory distress, no audible wheezing, no labored breathing, symmetric thoracic excursion  Psychiatric: mood and affect are appropriate, patient is oriented to time, place and " person  Musculoskeletal: Deferred  Skin: No rashes, lesions, or ecchymosis present      IMAGING :     MR LUMBAR    IMPRESSION:  1.  Degenerative changes lumbar spine most marked L5-S1 where there is marked loss of disc height, and adjacent Modic type II endplate signal change. Mild to moderate bilateral foraminal compromise suggested at this level.     2.  There is no spinal canal compromise at any lumbar level     3.  Low-grade superior endplate compressions without and with chronic Schmorl's node endplate deformity is are present T12-L1 and L2. No marrow or ligamentous edema is identified. Nothing for marrow infiltrative process. These appear chronic/benign and   were present on plain film study 03/17/2022.    MRI HIP       ASSESSMENT & PLAN  Ms. Brasher is a 51 year old year old female who presents virtually today with ongoing right hip pain and lumbar pain with radicular componenent extending down leg.    Diagnosis:  Lumbar degenerative disc disease  Chronic pain of right hip  Mild osteoarthritis of right hip  Achilles tendinitis of right lower leg    -Diagnostic/therapeutic L5-S1 injection discussed  -Symptom diary  -Encouraged PT for lumbar, achilles, and hip pain  -If no improvement of hip with lumbar injection, consider trochanteric  -Follow up 2 weeks after STEPHANY in-person would be recommended  -Discussed bariatric surgery and do believe some of her MSK issues may be ameliorated by this procedure, of course she understands degenerative changes present will not be reversed    Video visit start time 1302  Video visit end time 1323    Arden Dee DO, [facility]  Primary Care Sports Medicine  [facility]

## 2022-07-20 NOTE — LETTER
"    7/20/2022         RE: Tiffani Brasher  [address]  [facility] [address]        Dear Colleague,    Thank you for referring your patient, Tiffani Brasher, to the [facility] [place]. Please see a copy of my visit note below.    ESTABLISHED PATIENT FOLLOW-UP VIRTUAL:  Follow up Right hip    This encounter was conducted via telephone in lieu of face-to-face encounter due to precautions implemented during COVID-19 pandemic.     HISTORY OF PRESENT ILLNESS  Ms. Brasher is a 51 year old year old female who presents virtually today for follow-up of right hip pain and low back pain    Date of injury:   Date last seen: 3/17/22  Following Therapeutic Plan: Yes  Pain: Improved somewhat then returned at right hip.    Function: Unchanged  Interval History: Tiffani notes ongoing pain right hip since fall 9/17/22.  She has noted pain lateral hip, pain wraps to groin. Tingling in region.  Tingling and pain shoots down right lower extremity.  Known achilles tendinitis with pain right lower leg.  More recent left calf and achilles region pain.       Still has episodes of hip feeling like \"it is dislocating\". Few recent episodes and last appx 15 seconds of severe pain.  Last time occurred standing from seated position on ledge of bath tub.    Has not completed PT  Planning for bariatric surgery and notes she is  Hopeful that her MSK complaints will subside with surgery.    Additional medical/Social/Surgical histories reviewed in [facility] and updated as appropriate.    REVIEW OF SYSTEMS (7/20/2022)  CONSTITUTIONAL: Denies fever and weight loss  GASTROINTESTINAL: Denies abdominal pain, nausea, vomiting  MUSCULOSKELETAL: See HPI  SKIN: Denies any recent rash or lesion  NEUROLOGICAL: Denies numbness or focal weakness    PHYSICAL EXAMINATION  General Appearance: Well appearing, alert, in no acute distress, well-hydrated, and well nourished  ENT: Pupils equal, round, no conjunctival injection.  No lid " lag  Cardiovascular: no signs of upper extremity edema  Respiratory: no respiratory distress, no audible wheezing, no labored breathing, symmetric thoracic excursion  Psychiatric: mood and affect are appropriate, patient is oriented to time, place and person  Musculoskeletal: Deferred  Skin: No rashes, lesions, or ecchymosis present      IMAGING :     MR LUMBAR    IMPRESSION:  1.  Degenerative changes lumbar spine most marked L5-S1 where there is marked loss of disc height, and adjacent Modic type II endplate signal change. Mild to moderate bilateral foraminal compromise suggested at this level.     2.  There is no spinal canal compromise at any lumbar level     3.  Low-grade superior endplate compressions without and with chronic Schmorl's node endplate deformity is are present T12-L1 and L2. No marrow or ligamentous edema is identified. Nothing for marrow infiltrative process. These appear chronic/benign and   were present on plain film study 03/17/2022.    MRI HIP       ASSESSMENT & PLAN  Ms. Brasher is a 51 year old year old female who presents virtually today with ongoing right hip pain and lumbar pain with radicular componenent extending down leg.    Diagnosis:  Lumbar degenerative disc disease  Chronic pain of right hip  Mild osteoarthritis of right hip  Achilles tendinitis of right lower leg    -Diagnostic/therapeutic L5-S1 injection discussed  -Symptom diary  -Encouraged PT for lumbar, achilles, and hip pain  -If no improvement of hip with lumbar injection, consider trochanteric  -Follow up 2 weeks after STEPHANY in-person would be recommended  -Discussed bariatric surgery and do believe some of her MSK issues may be ameliorated by this procedure, of course she understands degenerative changes present will not be reversed    Arden Dee DO, [facility]  Primary Care Sports Medicine  [facility]            Again, thank you for allowing me to participate in the care of your patient.         Sincerely,        Arden Dee, DO

## 2022-07-20 NOTE — TELEPHONE ENCOUNTER
Left VM for patient regarding video visit today. Dr. Dee was unable to connect with patient. Requesting call back from patient.     Haylee Gentile MSA, ATC  Certified Athletic Trainer

## 2022-07-22 ENCOUNTER — VIRTUAL VISIT (OUTPATIENT)
Dept: FAMILY MEDICINE | Facility: CLINIC | Age: 52
End: 2022-07-22
Payer: COMMERCIAL

## 2022-07-22 ENCOUNTER — NURSE TRIAGE (OUTPATIENT)
Dept: FAMILY MEDICINE | Facility: CLINIC | Age: 52
End: 2022-07-22

## 2022-07-22 DIAGNOSIS — R06.2 WHEEZE: ICD-10-CM

## 2022-07-22 DIAGNOSIS — U07.1 INFECTION DUE TO 2019 NOVEL CORONAVIRUS: Primary | ICD-10-CM

## 2022-07-22 PROCEDURE — 99214 OFFICE O/P EST MOD 30 MIN: CPT | Mod: 95 | Performed by: INTERNAL MEDICINE

## 2022-07-22 RX ORDER — ALBUTEROL SULFATE 90 UG/1
2 AEROSOL, METERED RESPIRATORY (INHALATION) EVERY 6 HOURS
Qty: 18 G | Refills: 0 | Status: SHIPPED | OUTPATIENT
Start: 2022-07-22 | End: 2022-10-17

## 2022-07-22 ASSESSMENT — PAIN SCALES - GENERAL: PAINLEVEL: SEVERE PAIN (7)

## 2022-07-22 NOTE — PROGRESS NOTES
Tiffani is a 51 year old who is being evaluated via a billable video visit.      How would you like to obtain your AVS? MyChart  If the video visit is dropped, the invitation should be resent by: Text to cell phone: [phone]  Will anyone else be joining your video visit? No          Assessment & Plan   Problem List Items Addressed This Visit    None     Visit Diagnoses     Infection due to 2019 novel coronavirus    -  Primary    Relevant Medications    nirmatrelvir and ritonavir (PAXLOVID) therapy pack    Wheeze        Relevant Medications    albuterol (PROAIR HFA/PROVENTIL HFA/VENTOLIN HFA) 108 (90 Base) MCG/ACT inhaler                    Patient Instructions   1. Order for Paxlovid  2. Hold the atorvastatin while on the Paxlovid and for 5 days after completing the Paxlovid  3. Hold off on the herbal mix Umcka  4. Order for albuterol inhaler 1-2 puffs up to 4 x day as needed.  Watch for palpitations, anxiety.  5. Can use Afrin 1 spray both nostrils x 2-3 days.  Otherwise can try Saline nasal spray - to facilitate CPAP usage  6. Follow-up is symptoms worsen    --For cough - try Robitussin DM or Mucinex DM (Acitve ingredients Guaifenesin and dextromethorphan)  --For nasal congestion, try saline nasal spray (Ocean brand or similar.  NOT Afrin)  --For sore throat and cough, try Chloraseptic spray, cough drops, warm salt water gargles or tea with honey.    --Use a humidifier at night  --For body aches and pains and fever, try acetaminophen or ibuprofen        What are the symptoms of COVID-19?  Symptoms can include fever, cough, shortness of breath, chills, headache, muscle pain sore throat, fatigue, runny or stuffy nose, and loss of taste and smell. Other less common symptoms include nausea, vomiting, or diarrhea (watery stools).    Know when to call 911. Emergency warning signs include:    Trouble breathing or shortness of breath    Pain or pressure in the chest that doesn't go away    Feeling confused like you  haven't felt before, or not being able to wake up    Bluish-colored lips or face    How can I take care of myself?  1. Get lots of rest. Drink extra fluids (unless a doctor has told you not to).  2. Take Tylenol (acetaminophen) for fever or pain. If you have liver or kidney problems, ask your family doctor if it's okay to take Tylenol   Adults:   650 mg (two 325 mg pills or tablets) every 4 to 6 hours, or...   1,000 mg (two 500 mg pills or tablets) every 8 hours as needed.  Note: Don't take more than 3,000 mg in one day. Acetaminophen is found in many medicines (both prescribed and over the counter). Read all labels to be sure you don't take too much.  For children, check the Tylenol bottle for the right dose. The dose is based on the child's age or weight.  3. Take over the counter medicines for your symptoms as needed. Talk to your pharmacist.  4. If you have other health problems (like cancer, heart failure, an organ transplant, or severe kidney disease): Call your specialty clinic if you don't feel better in the next 2 days.    These guidelines are for isolating and quarantining before returning to work, school or .     For employers, schools and day cares: This is an official notice for this person s medical guidelines for returning in-person.     For health care sites: The CDC gives different isolation and quarantine guidelines for healthcare sites, please check with these sites before arriving.     How do I self-isolate?  You isolate when you have symptoms of COVID or a test shows you have COVID, even if you don t have symptoms.     If you DO have symptoms:  o Stay home and away from others  - For at least 5 days after your symptoms started, AND   - You are fever free for 24 hours (with no medicine that reduces fever), AND  - Your other symptoms are better.  o Wear a mask for 10 full days any time you are around others.    If you DON T have symptoms:  o Stay at home and away from others for at least 5  days after your positive test.  o Wear a mask for 10 full days any time you are around others.    How and when do I quarantine?  You quarantine when you may have been exposed to the virus and DON T have symptoms.     Stay home and away from others.     You must quarantine for 5 days after your last contact with a person who has COVID.  o Note: If you are fully vaccinated, you don t need to quarantine. You should still follow the steps below.     Wear a mask for 10 full days any time you re around others.    Get tested at least 5 days after you were exposed, even if you don t have symptoms.     If you start to have symptoms, isolate right away and get tested.    Where can I get more information?    [facility] COVID-19 Resource Hub: [email or web address]     CDC Quarantine & Isolation: https://www.cdc.gov/coronavirus/2019-ncov/your-health/quarantine-isolation.html     [facility] - What to Do If You're Sick: https://www.cdc.gov/coronavirus/2019-ncov/if-you-are-sick/index.html    [facility] clinical trials (COVID-19 research studies): clinicalaffairs.[email or web address]/umn-clinical-trials    [place] Department of Health COVID-19 Public Hotline: 1-288.541.1779      No follow-ups on file.    Batsheva Ascencio,   [facility]    Lindsey Nixon is a 51 year old accompanied by her self, presenting for the following health issues:  Covid Concern (At home test was positive yesterday is caring for two people that are positive also )      HPI     Chief Complaint   Patient presents with     Covid Concern     At home test was positive yesterday is caring for two people that are positive also          COVID-19 Symptom Review  How many days ago did these symptoms start? 7/19/22    Are any of the following symptoms significant for you?  New or worsening difficulty breathing? Yes    Please describe what kind of difficulty you are having breathing:Mild dyspnea (able to do ADLs without  difficulty, mild shortness of breath with activities such as climbing one or two flights of stairs or walking briskly)    Worsening cough? Yes, I am coughing up mucus. white    Fever or chills? No 100.1    Headache: YES    Sore throat: YES    Chest pain: No    Diarrhea: No    Body aches? YES    What treatments has patient tried? Cough drop night quil    Does patient live in a nursing home, group home, or shelter? No  Does patient have a way to get food/medications during quarantined? Yes, I have a friend or family member who can help me.  --she has had 3 COVID vaccines  --caring for mother who has COVID  --Tiffani's oxygen has been 93-94%  --she had history of asthma as child, chest feels a bit tight, wheezy                  Review of Systems   Constitutional, HEENT, cardiovascular, pulmonary, gi and gu systems are negative, except as otherwise noted.      Objective    Vitals - Patient Reported  Pain Score: Severe Pain (7)  Pain Loc: Head      Vitals:  No vitals were obtained today due to virtual visit.    Physical Exam   GENERAL: alert, no distress and fatigued  EYES: Eyes grossly normal to inspection.  No discharge or erythema, or obvious scleral/conjunctival abnormalities.  RESP: no wheezes and occ dry cough  SKIN: Visible skin clear. No significant rash, abnormal pigmentation or lesions.  NEURO: Cranial nerves grossly intact.  Mentation and speech appropriate for age.  PSYCH: Mentation appears normal, affect normal/bright, judgement and insight intact, normal speech and appearance well-groomed.                Video-Visit Details    Video Start Time: 1:12 PM    Type of service:  Video Visit    Video End Time:1:29 PM    Originating Location (pt. Location): Home    Distant Location (provider location):  [facility]     Platform used for Video Visit: [employer]  ..

## 2022-07-22 NOTE — PATIENT INSTRUCTIONS
Order for Paxlovid  Hold the atorvastatin while on the Paxlovid and for 5 days after completing the Paxlovid  Hold off on the herbal mix Umcka  Order for albuterol inhaler 1-2 puffs up to 4 x day as needed.  Watch for palpitations, anxiety.  Can use Afrin 1 spray both nostrils x 2-3 days.  Otherwise can try Saline nasal spray - to facilitate CPAP usage  Follow-up is symptoms worsen    --For cough - try Robitussin DM or Mucinex DM (Acitve ingredients Guaifenesin and dextromethorphan)  --For nasal congestion, try saline nasal spray (Ocean brand or similar.  NOT Afrin)  --For sore throat and cough, try Chloraseptic spray, cough drops, warm salt water gargles or tea with honey.    --Use a humidifier at night  --For body aches and pains and fever, try acetaminophen or ibuprofen        What are the symptoms of COVID-19?  Symptoms can include fever, cough, shortness of breath, chills, headache, muscle pain sore throat, fatigue, runny or stuffy nose, and loss of taste and smell. Other less common symptoms include nausea, vomiting, or diarrhea (watery stools).    Know when to call 911. Emergency warning signs include:  Trouble breathing or shortness of breath  Pain or pressure in the chest that doesn't go away  Feeling confused like you haven't felt before, or not being able to wake up  Bluish-colored lips or face    How can I take care of myself?  Get lots of rest. Drink extra fluids (unless a doctor has told you not to).  Take Tylenol (acetaminophen) for fever or pain. If you have liver or kidney problems, ask your family doctor if it's okay to take Tylenol   Adults:   650 mg (two 325 mg pills or tablets) every 4 to 6 hours, or...   1,000 mg (two 500 mg pills or tablets) every 8 hours as needed.  Note: Don't take more than 3,000 mg in one day. Acetaminophen is found in many medicines (both prescribed and over the counter). Read all labels to be sure you don't take too much.  For children, check the Tylenol bottle for the  right dose. The dose is based on the child's age or weight.  Take over the counter medicines for your symptoms as needed. Talk to your pharmacist.  If you have other health problems (like cancer, heart failure, an organ transplant, or severe kidney disease): Call your specialty clinic if you don't feel better in the next 2 days.    These guidelines are for isolating and quarantining before returning to work, school or .   For employers, schools and day cares: This is an official notice for this person s medical guidelines for returning in-person.   For health care sites: The CDC gives different isolation and quarantine guidelines for healthcare sites, please check with these sites before arriving.     How do I self-isolate?  You isolate when you have symptoms of COVID or a test shows you have COVID, even if you don t have symptoms.   If you DO have symptoms:  Stay home and away from others  For at least 5 days after your symptoms started, AND   You are fever free for 24 hours (with no medicine that reduces fever), AND  Your other symptoms are better.  Wear a mask for 10 full days any time you are around others.  If you DON T have symptoms:  Stay at home and away from others for at least 5 days after your positive test.  Wear a mask for 10 full days any time you are around others.    How and when do I quarantine?  You quarantine when you may have been exposed to the virus and DON T have symptoms.   Stay home and away from others.   You must quarantine for 5 days after your last contact with a person who has COVID.  Note: If you are fully vaccinated, you don t need to quarantine. You should still follow the steps below.   Wear a mask for 10 full days any time you re around others.  Get tested at least 5 days after you were exposed, even if you don t have symptoms.   If you start to have symptoms, isolate right away and get tested.    Where can I get more information?  [facility] COVID-19 Resource Hub:  [email or web address]   CDC Quarantine & Isolation: https://www.cdc.gov/coronavirus/2019-ncov/your-health/quarantine-isolation.html   CDC - What to Do If You're Sick: https://www.cdc.gov/coronavirus/2019-ncov/if-you-are-sick/index.html  [facility] clinical trials (COVID-19 research studies): clinicalaffairs.[facility]/n-clinical-trials  [place] Department of Health COVID-19 Public Hotline: 1-758.625.2587

## 2022-07-22 NOTE — TELEPHONE ENCOUNTER
Pt reports she tested positive for COVID-19 last night. She had nasal discharge since Monday. Is a high risk pt due to HTN and her weight. Pt was advised on quarantine and warning signs and when to seek care at ER. She was transferred to [address] scheduling line.     Reason for Disposition    HIGH RISK for severe COVID complications (e.g., weak immune system, age > 64 years, obesity with BMI > 25, pregnant, chronic lung disease or other chronic medical condition) (Exception: Already seen by PCP and no new or worsening symptoms.)    Additional Information    Negative: SEVERE difficulty breathing (e.g., struggling for each breath, speaks in single words)    Negative: Difficult to awaken or acting confused (e.g., disoriented, slurred speech)    Negative: Bluish (or gray) lips or face now    Negative: Shock suspected (e.g., cold/pale/clammy skin, too weak to stand, low BP, rapid pulse)    Negative: Sounds like a life-threatening emergency to the triager    Negative: [1] Diagnosed or suspected COVID-19 AND [2] symptoms lasting 3 or more weeks    Negative: [1] COVID-19 exposure AND [2] no symptoms    Negative: COVID-19 vaccine reaction suspected (e.g., fever, headache, muscle aches) occurring 1 to 3 days after getting vaccine    Negative: COVID-19 vaccine, questions about    Negative: [1] Lives with someone known to have influenza (flu test positive) AND [2] flu-like symptoms (e.g., cough, runny nose, sore throat, SOB; with or without fever)    Negative: [1] Adult with possible COVID-19 symptoms AND [2] triager concerned about severity of symptoms or other causes    Negative: COVID-19 and breastfeeding, questions about    Negative: SEVERE or constant chest pain or pressure  (Exception: Mild central chest pain, present only when coughing.)    Negative: MODERATE difficulty breathing (e.g., speaks in phrases, SOB even at rest, pulse 100-120)    Negative: Headache and stiff neck (can't touch chin to chest)    Negative:  Oxygen level (e.g., pulse oximetry) 90 percent or lower    Negative: Chest pain or pressure    Negative: Patient sounds very sick or weak to the triager    Negative: MILD difficulty breathing (e.g., minimal/no SOB at rest, SOB with walking, pulse <100)    Negative: Fever > 103 F (39.4 C)    Negative: [1] Fever > 101 F (38.3 C) AND [2] over 60 years of age    Negative: [1] Fever > 100.0 F (37.8 C) AND [2] bedridden (e.g., nursing home patient, CVA, chronic illness, recovering from surgery)    Protocols used: CORONAVIRUS (COVID-19) DIAGNOSED OR [id number] 1.18.2022

## 2022-07-28 ENCOUNTER — TELEPHONE (OUTPATIENT)
Dept: SURGERY | Facility: CLINIC | Age: 52
End: 2022-07-28

## 2022-07-28 ENCOUNTER — VIRTUAL VISIT (OUTPATIENT)
Dept: SURGERY | Facility: CLINIC | Age: 52
End: 2022-07-28
Payer: COMMERCIAL

## 2022-07-28 VITALS — HEIGHT: 70 IN | BODY MASS INDEX: 41.95 KG/M2 | WEIGHT: 293 LBS

## 2022-07-28 DIAGNOSIS — G47.33 OSA (OBSTRUCTIVE SLEEP APNEA): ICD-10-CM

## 2022-07-28 DIAGNOSIS — E66.01 MORBID OBESITY WITH BMI OF 45.0-49.9, ADULT (H): Primary | ICD-10-CM

## 2022-07-28 DIAGNOSIS — I10 ESSENTIAL HYPERTENSION: ICD-10-CM

## 2022-07-28 PROCEDURE — 99215 OFFICE O/P EST HI 40 MIN: CPT | Mod: 95 | Performed by: FAMILY MEDICINE

## 2022-07-28 NOTE — PROGRESS NOTES
Tiffani is a 51 year old who is being evaluated via a billable video visit.      If the video visit is dropped, the invitation should be resent by: Text to cell phone: [phone]  Will anyone else be joining your video visit? No      Video-Visit Details    Type of service:  Video Visit- switched to phone due to technical difficulties    Video Start Time: 11:44 AM    Video End Time:12:06 PM        Originating Location (pt. Location): Home    Distant Location (provider location):  [facility] [place]     Platform used for Video Visit: [employer]        7/28/22  Visit With: ALANNA Moya MD, MD        Tiffani Brasher is a 51 year old year old female who is hoping to have Bariatric surgery.        Assessment/Plan:  1. Morbid obesity with BMI of 45.0-49.9, adult (H)  Patient is hoping to have bariatric surgery. Task list reviewed with the patient. She will get a mammogram and have her labs drawn once she is cleared from [facility]. She states her therapist Dr. Buenrostro sent a letter of support but I do not see it in her chart. We will reach out to try to get this. Once these tasks are complete she will meet with the surgeon.    2. Essential hypertension  This may improve with healthy habits and weight loss.    3. MAIN (obstructive sleep apnea)  Patient has been compliant with her CPAP for > 30 days.            Interim History:  Patient has been cleared by the psychologist. She has been cleared by the dietician.        Allergies   Patient has no known allergies.  Medications     Current Outpatient Medications   Medication Sig Dispense Refill     albuterol (PROAIR HFA/PROVENTIL HFA/VENTOLIN HFA) 108 (90 Base) MCG/ACT inhaler Inhale 2 puffs into the lungs every 6 hours 18 g 0     aspirin (ASA) 81 MG EC tablet Take 1 tablet (81 mg) by mouth daily 90 tablet 1     atorvastatin (LIPITOR) 40 MG tablet Take 1 tablet (40 mg) by mouth daily 90 tablet 1     Calcium Carb-Cholecalciferol (CALCIUM  600/VITAMIN D) 600-400 MG-UNIT CHEW Take 1 tablet by mouth 2 times daily Take one twice daily and at least 2 hours apart from iron. 180 tablet 1     LORazepam (ATIVAN) 0.5 MG tablet Take 1 tablet (0.5 mg) by mouth daily as needed for anxiety do not take with Remeron, do not drive, drink alcohol or use fine machinery on this medicine (Patient not taking: Reported on 7/18/2022) 15 tablet 0     Multiple Vitamins-Iron (QC DAILY MULTIVITAMINS/IRON) TABS Take 1 tablet by mouth daily 90 tablet 1     propranolol ER (INDERAL LA) 80 MG 24 hr capsule Take 1 capsule (80 mg) by mouth daily 90 capsule 0     sertraline (ZOLOFT) 100 MG tablet Take 1.5 tablets (150 mg) by mouth daily 45 tablet 2     UNABLE TO FIND MEDICATION NAME: Liquid iron - dose unknown       vitamin D2 (ERGOCALCIFEROL) 90774 units (1250 mcg) capsule Take 1 capsule (50,000 Units) by mouth once a week 12 capsule 0     Problem List     Patient Active Problem List   Diagnosis     Anemia, iron deficiency     Morbid obesity with BMI of 45.0-49.9, adult (H)     JAXON (generalized anxiety disorder)     Severe episode of recurrent major depressive disorder, without psychotic features (H)     PTSD (post-traumatic stress disorder)     Insomnia, unspecified type     Essential hypertension     Intertrigo     MAIN (obstructive sleep apnea)     Past Medical History     Past Medical History:   Diagnosis Date     Anemia      Anemia, iron deficiency 4/9/2019     Essential hypertension 11/20/2020     JAXON (generalized anxiety disorder) 10/8/2018     Insomnia, unspecified type 11/20/2020     MAIN (obstructive sleep apnea) 4/13/2021     PTSD (post-traumatic stress disorder) 3/1/2019     Severe episode of recurrent major depressive disorder, without psychotic features (H) 10/8/2018     Surgical History     Past Surgical History:   Procedure Laterality Date     GYN SURGERY  2007    myomectomy     LAPAROSCOPIC HYSTERECTOMY TOTAL N/A 4/23/2019    Procedure: single site total laparoscopic  "hysterectomy, lysis of adhesion, drainage of ovarian cyst;  Surgeon: Vicki Hamilton MD;  Location: [facility] OR     ORTHOPEDIC SURGERY Right     knee          Examination     Ht Readings from Last 1 Encounters:   06/29/22 5' 10\" (1.778 m)     Wt Readings from Last 1 Encounters:   06/29/22 317 lb (143.8 kg)     There is no height or weight on file to calculate BMI.  LMP 04/04/2019     Physical Exam:    GEN: Alert and oriented in no acute distress.   General: alert, NAD, she does not sound winded.  LABS: ordered      Patient was reminded that, to avoid marginal ulcers she should avoid tobacco at all, alcohol in excess, caffeine in excess, and NSAIDS (unless indicated for cardioprotection or othewise and opposed by a PPI).    She was encouraged to establish and maintain a consistent physical activity routine, 6-8 hours of restorative sleep each night and optimal stress management.    Patient was reminded about the importance of life long vitamin supplementation and life long follow up.    Total time spent on the date of this encounter doing: chart review, review of test results, patient visit, physical exam, education, counseling, developing plan of care and documenting = 49 minutes.   "

## 2022-07-28 NOTE — PATIENT INSTRUCTIONS
Before being submitted for insurance approval, you will need the following:    -Clearance by the Psychologist  -Clearance by the dietitian  -Routine Health Care Maintenance should be up to date (mammograms yearly after age 50, paps as recommended by your primary provider,  colonoscopy after age 50, earlier if high risk.  -Establish a Primary Care Provider if you do not already have one  -Pre Operative Lab work-ordered today  -Achieve weight loss goals prior to surgery if given  -Structured weight loss visits IF mandated by your insurance carrier  -Surgeon consult  -You will need to be using CPAP for at least one month before surgery if you have sleep apnea. Make sure to bring your CPAP or BiPAP to the hospital at the time of surgery.  -You will need to be tobacco free for 2 months before surgery and remain a non-smoker thereafter. If you are currently smoking or have recently quit, your urine will be evaluated for tobacco metabolites pre-operatively.  -If you are on insulin, you might be referred to an endocrinologist who will manage your insulin during the liquid diet and around the time of surgery. This endocrinologist does not replace your primary provider or your endocrinologist.   -You will need an exercise plan which includes MOVE, ie., walking and MUSCLE, ie.,calisthenics, bands, weight, machines, etc...  ______________________________________________________________________    Remember that after your bariatric surgery, vitamin supplementation is a lifelong need.    You will take:    B-12 1000mcg or higher sublingual (under the tongue) daily or by injection 1-2X/month  D3 5000U daily   Multivitamin containing 18mg of iron twice a day  Calcium citrate 1 or 2 daily  Iron with vitamin C if you are a menstruating female  B Complex 50 mg every day or thiamine 100 mg once a week may be indicated    To keep your weight off and your vitamin levels up, follow-up is important.    Your labs will be monitored every 6  months for the first two years and yearly thereafter.    To avoid ulcers in your stomach avoid tobacco forever, alcohol in excess, caffeine in excess and anti-inflammatories (NSAIDS)  (Aspirin, Ibuprofen, Naproxen and similar medications). Tylenol is fine.  If you are told by your physician take Aspirin to protect your heart or for another reason, make sure to take omeprazole or similar medication (protonix, nexium, prevacid) to protect your stomach.    Remember that alcohol affects you differently after bariatric surgery. If you have even ONE drink DO NOT DRIVE.

## 2022-07-28 NOTE — TELEPHONE ENCOUNTER
Called pt per provider's request to let pt know if found Wendy Buenrostro (therapist) letter of support and get her info re: Dr. George's support group.    Informed pt that no letter was received.  Was not able to look far enough back in Right Fax to date pt stated letter was faxed.    Requested pt contact therapist and request refax. Number provided.  Informed pt this clinic had no DENISE to speak with therapist therefore pt would have to.  Will watch for fax and notify pt when received.     Informed pt that will [facility] msg Dr. George's support group info/contact.This was done.  Kimberly Mahan, MS, RD, RN

## 2022-08-04 ENCOUNTER — TELEPHONE (OUTPATIENT)
Dept: SURGERY | Facility: CLINIC | Age: 52
End: 2022-08-04

## 2022-08-04 NOTE — TELEPHONE ENCOUNTER
Called pt and informed her clinic has not yet received letter of support from her therapist.  Fax number given to pt who read back correct number.  Kimberly Mahan, MS, RD, RN

## 2022-08-10 ENCOUNTER — TELEPHONE (OUTPATIENT)
Dept: SURGERY | Facility: CLINIC | Age: 52
End: 2022-08-10

## 2022-08-10 NOTE — TELEPHONE ENCOUNTER
Called and spoke with pt informing pt that therapist still has not faxed the letter of support.  Pt read back fax number which was correct.  Pt stated will call therapist now and will call back clinic.  Kimberly Mahan MS, RD, RN

## 2022-09-18 ENCOUNTER — HEALTH MAINTENANCE LETTER (OUTPATIENT)
Age: 52
End: 2022-09-18

## 2022-09-19 ENCOUNTER — E-VISIT (OUTPATIENT)
Dept: FAMILY MEDICINE | Facility: CLINIC | Age: 52
End: 2022-09-19
Payer: COMMERCIAL

## 2022-09-19 DIAGNOSIS — F39 MOOD DISORDER (H): Primary | ICD-10-CM

## 2022-09-19 PROCEDURE — 99207 PR NON-BILLABLE SERV PER CHARTING: CPT | Performed by: INTERNAL MEDICINE

## 2022-09-19 ASSESSMENT — ANXIETY QUESTIONNAIRES
3. WORRYING TOO MUCH ABOUT DIFFERENT THINGS: MORE THAN HALF THE DAYS
7. FEELING AFRAID AS IF SOMETHING AWFUL MIGHT HAPPEN: MORE THAN HALF THE DAYS
4. TROUBLE RELAXING: MORE THAN HALF THE DAYS
6. BECOMING EASILY ANNOYED OR IRRITABLE: SEVERAL DAYS
1. FEELING NERVOUS, ANXIOUS, OR ON EDGE: MORE THAN HALF THE DAYS
5. BEING SO RESTLESS THAT IT IS HARD TO SIT STILL: NOT AT ALL
GAD7 TOTAL SCORE: 11
2. NOT BEING ABLE TO STOP OR CONTROL WORRYING: MORE THAN HALF THE DAYS
GAD7 TOTAL SCORE: 11
GAD7 TOTAL SCORE: 11
7. FEELING AFRAID AS IF SOMETHING AWFUL MIGHT HAPPEN: MORE THAN HALF THE DAYS

## 2022-09-19 ASSESSMENT — PATIENT HEALTH QUESTIONNAIRE - PHQ9
SUM OF ALL RESPONSES TO PHQ QUESTIONS 1-9: 7
SUM OF ALL RESPONSES TO PHQ QUESTIONS 1-9: 7
10. IF YOU CHECKED OFF ANY PROBLEMS, HOW DIFFICULT HAVE THESE PROBLEMS MADE IT FOR YOU TO DO YOUR WORK, TAKE CARE OF THINGS AT HOME, OR GET ALONG WITH OTHER PEOPLE: SOMEWHAT DIFFICULT

## 2022-09-19 NOTE — TELEPHONE ENCOUNTER
Routing to team coordinators to please see Dr. Barry's below response and call and assist patient with scheduling appointment.     Thank you!      Cristin Middleton RN BSN MSN  [facility]

## 2022-09-19 NOTE — TELEPHONE ENCOUNTER
I would advise her to make an appointment to discuss her mood complaints  Can you help her arrange such an appointment ?    Provider E-Visit time total (minutes): 5

## 2022-09-20 ASSESSMENT — PATIENT HEALTH QUESTIONNAIRE - PHQ9: SUM OF ALL RESPONSES TO PHQ QUESTIONS 1-9: 7

## 2022-09-20 ASSESSMENT — ANXIETY QUESTIONNAIRES: GAD7 TOTAL SCORE: 11

## 2022-10-12 NOTE — PROGRESS NOTES
"ESTABLISHED PATIENT FOLLOW-UP:  No chief complaint on file.       HISTORY OF PRESENT ILLNESS  Ms. Brasher is a pleasant 52 year old year old female who presents to clinic today for follow-up of right hip pain.    CC: Right hip and back  Date of injury: chronic 2-3+ years  Date last seen: 7/20/2022  Following Therapeutic Plan: had right hip intra-articular corticosteriod injection in May 2020 which provided 1.5 months of relief. Did not schedule lumbar STEPHANY. Did not do physical therapy.   Pain: 7/10 today. Right groin radiating down medial leg. Notes incontinence occasionally. Numbness and weakness as well.   Function: limited due to pain  Interval History: no changes since last office visit. Worsening radicular pain from back to groin.  Also notes \"lava\" like sensation of paresthesias extending from her upper thigh into her lower leg.  She has no paresthesias of her left lower extremity.  Denies anesthesia or dysesthesia.  She has however noted 2 episodes of incontinence that has occurred in conjunction with right lower extremity paresthesias.  Denies any vision changes.    Father with history of multiple sclerosis    CC: Ankle pain L>R  Regarding the bilateral ankle,  Onset: gradual, worsening over the past~1-2 years  Location: bilateral posterior ankle, Achilles insertion, today left>R  Quality: firey, burning, sharp, stabbing  Duration: 1.5 years   Severity: 8/10 at worst  Timing: worsens with walking   Modifying factors:  resting and non-use makes it better, movement and use makes it worse  Associated signs & symptoms: denies numbness or tingling   Previous similar pain: Yes - R Achilles tendon rupture June 2021, no treatment for this at the time. Denies left ankle injuries  Treatments to date: compression stocking, calf stretching    Sprain also states that she has been approved for bariatric surgery.  She is planning to proceed with a gastric sleeve.  She is hoping that this happens in the next several months.  " She is encouraged that this will help reduce some of the stress on her joints.    Additional medical/Social/Surgical histories reviewed in [facility] and updated as appropriate.    REVIEW OF SYSTEMS (10/12/2022)  CONSTITUTIONAL: Denies fever and weight loss  GASTROINTESTINAL: Denies abdominal pain, nausea, vomiting  MUSCULOSKELETAL: See HPI  SKIN: Denies any recent rash or lesion  NEUROLOGICAL: Denies numbness or focal weakness     PHYSICAL EXAM  LMP 04/04/2019     General  - normal appearance, in no obvious distress,ectomorphic habitus  Musculoskeletal - lumbar spine  - stance: slow to rise and sit  - inspection: normal bone and joint alignment, no obvious scoliosis  - palpation: bilateral lumbar paravertebral spasm, tenderness at lumbosacral region near L5 bilaterally  - ROM: pain with left rotation, flexion past 45 deg,   - strength: lower extremities 5/5 in all planes  - special tests:  (-) straight leg raise bilaterally  (-) slump test  Musculoskeletal -right hip  - stance: normal gait without limp  - inspection: no swelling or effusion,  normal bone and joint alignment, no obvious deformity  - palpation: no lateral or anterior hip tenderness  - ROM:  Pain and limited flexion past 90, pain and limited internal rotation. ER full and painless  - strength: 5/5 in all planes except hip flexion 4/5 with pain  - special tests:  (-) BENJA  (-) FADIR  no pain with axial femoral load  Musculoskeletal - foot / ankle bilaterally  - stance: normal gait without limp, difficulty performing heel raise on right  - inspection: No inspection, no visible deformity  - palpation: tender over mid substance Achilles tendon on the left at mid substance and distal Achilles insertion.  Tender in the right ankle at distal Achilles insertion.  No visible Kelsie deformities.  - ROM:Full ankle ROM in all planes  - strength: 5/5 in all planes  Neuro  - no sensory or motor deficit, grossly normal coordination, normal muscle tone  Skin  - no  ecchymosis, erythema, warmth, or induration, no obvious rash  Psych  - interactive, appropriate, normal mood and affect    IMAGING : X-ray bilateral ankle. Final results and radiologist's interpretation, available in the [facility] health record. Images were reviewed with the patient/family members in the office today. My personal interpretation of the performed imaging is enthesopathy seen at the calcaneus at Achilles insertion sites.  Plantar fascial enthesophytes.  No significant degenerative changes seen.     ASSESSMENT & PLAN  Ms. Brasher is a 52 year old year old female with past medical history of left Achilles injury in 2019, morbid obesity presenting for follow-up regarding right hip pain, paresthesias affecting right lower extremity as well as bilateral Achilles tendinitis.      MRI of the lumbar spine does not explain her current pattern of paresthesias and episode of incontinence.  No significant foraminal or spinal stenosis from MRI in May.  Discussed consideration for neurology referral to further investigate this because she does have a family history of paternal MS.  May consider EMG as well.    Diagnosis:  Mild osteoarthritis of right hip  Achilles tendinopathy bilateral ankles  Paresthesias affecting right lower extremity  Morbid Obesity    Regard to her hip pain, she is hoping her bariatric surgery can help improve symptomatology.  She is interested at this time to consider formal physical therapy, which she was unable to attend prior.  Consider fluoroscopic guided injection if no improvement with above.    Lastly we discussed consideration for physical therapy.  Orders placed.  She will begin using heel lifts and counseled regarding appropriate footwear with a larger heel-to-toe drop.      Encouraged her that bariatric surgery will help offload her weightbearing joints and tendons and improve her ability for low-impact exercise.    It was a pleasure seeing Tiffani.    Arden Dee DO, CAQSM  Primary  Care Sports Medicine

## 2022-10-13 ENCOUNTER — ANCILLARY PROCEDURE (OUTPATIENT)
Dept: GENERAL RADIOLOGY | Facility: CLINIC | Age: 52
End: 2022-10-13
Attending: FAMILY MEDICINE
Payer: COMMERCIAL

## 2022-10-13 ENCOUNTER — OFFICE VISIT (OUTPATIENT)
Dept: ORTHOPEDICS | Facility: CLINIC | Age: 52
End: 2022-10-13
Payer: COMMERCIAL

## 2022-10-13 ENCOUNTER — ANCILLARY PROCEDURE (OUTPATIENT)
Dept: MAMMOGRAPHY | Facility: CLINIC | Age: 52
End: 2022-10-13
Attending: INTERNAL MEDICINE
Payer: COMMERCIAL

## 2022-10-13 VITALS — BODY MASS INDEX: 43.4 KG/M2 | HEIGHT: 69 IN | WEIGHT: 293 LBS

## 2022-10-13 DIAGNOSIS — M25.572 CHRONIC PAIN OF BOTH ANKLES: ICD-10-CM

## 2022-10-13 DIAGNOSIS — M76.60 INSERTIONAL ACHILLES TENDINOPATHY: ICD-10-CM

## 2022-10-13 DIAGNOSIS — M25.572 BILATERAL ANKLE PAIN: ICD-10-CM

## 2022-10-13 DIAGNOSIS — M25.571 CHRONIC PAIN OF BOTH ANKLES: ICD-10-CM

## 2022-10-13 DIAGNOSIS — R20.2 PARESTHESIA OF RIGHT LOWER EXTREMITY: Primary | ICD-10-CM

## 2022-10-13 DIAGNOSIS — G89.29 CHRONIC PAIN OF BOTH ANKLES: ICD-10-CM

## 2022-10-13 DIAGNOSIS — E66.01 MORBID OBESITY WITH BMI OF 45.0-49.9, ADULT (H): ICD-10-CM

## 2022-10-13 DIAGNOSIS — Z12.31 VISIT FOR SCREENING MAMMOGRAM: ICD-10-CM

## 2022-10-13 DIAGNOSIS — M25.571 BILATERAL ANKLE PAIN: ICD-10-CM

## 2022-10-13 LAB
ERYTHROCYTE [DISTWIDTH] IN BLOOD BY AUTOMATED COUNT: 14.1 % (ref 10–15)
HCT VFR BLD AUTO: 43 % (ref 35–47)
HGB BLD-MCNC: 13.6 G/DL (ref 11.7–15.7)
MCH RBC QN AUTO: 30.6 PG (ref 26.5–33)
MCHC RBC AUTO-ENTMCNC: 31.6 G/DL (ref 31.5–36.5)
MCV RBC AUTO: 97 FL (ref 78–100)
PLATELET # BLD AUTO: 238 10E3/UL (ref 150–450)
PTH-INTACT SERPL-MCNC: 52 PG/ML (ref 15–65)
RBC # BLD AUTO: 4.45 10E6/UL (ref 3.8–5.2)
WBC # BLD AUTO: 7.2 10E3/UL (ref 4–11)

## 2022-10-13 PROCEDURE — 82728 ASSAY OF FERRITIN: CPT | Performed by: FAMILY MEDICINE

## 2022-10-13 PROCEDURE — 77063 BREAST TOMOSYNTHESIS BI: CPT | Mod: TC | Performed by: RADIOLOGY

## 2022-10-13 PROCEDURE — 82306 VITAMIN D 25 HYDROXY: CPT | Performed by: FAMILY MEDICINE

## 2022-10-13 PROCEDURE — 36415 COLL VENOUS BLD VENIPUNCTURE: CPT | Performed by: FAMILY MEDICINE

## 2022-10-13 PROCEDURE — 99214 OFFICE O/P EST MOD 30 MIN: CPT | Performed by: FAMILY MEDICINE

## 2022-10-13 PROCEDURE — 85027 COMPLETE CBC AUTOMATED: CPT | Performed by: FAMILY MEDICINE

## 2022-10-13 PROCEDURE — 73610 X-RAY EXAM OF ANKLE: CPT | Mod: TC | Performed by: INTERNAL MEDICINE

## 2022-10-13 PROCEDURE — 77067 SCR MAMMO BI INCL CAD: CPT | Mod: TC | Performed by: RADIOLOGY

## 2022-10-13 PROCEDURE — 83970 ASSAY OF PARATHORMONE: CPT | Performed by: FAMILY MEDICINE

## 2022-10-13 NOTE — PATIENT INSTRUCTIONS
Thank you for choosing [facility]    DR DOAN'S CLINIC LOCATIONS  [place] [address] [address] [facility], [address]th [place]   [place], [address] [place] [address]   [phone]       APPOINTMENTS: [phone]    CARE QUESTIONS: [phone],    BILLING QUESTIONS: [phone]    FAX NUMBER: [phone]        1. Paresthesia of right lower extremity    2. Bilateral ankle pain        Physical Therapy orders have been placed with [facility] (formally [facility])  You can call [phone] to schedule at your convenience.     Try [employer] Heel Lift from Chele [facility]adams    Neurology will call you to schedule appointment

## 2022-10-13 NOTE — LETTER
"    10/13/2022         RE: Tiffani Brasher  [address]  [facility] [address]        Dear Colleague,    Thank you for referring your patient, Tiffani Brasher, to the [facility] [place]. Please see a copy of my visit note below.    ESTABLISHED PATIENT FOLLOW-UP:  No chief complaint on file.       HISTORY OF PRESENT ILLNESS  Ms. Brasher is a pleasant 52 year old year old female who presents to clinic today for follow-up of right hip pain.    CC: Right hip and back  Date of injury: chronic 2-3+ years  Date last seen: 7/20/2022  Following Therapeutic Plan: had right hip intra-articular corticosteriod injection in May 2020 which provided 1.5 months of relief. Did not schedule lumbar STEPHANY. Did not do physical therapy.   Pain: 7/10 today. Right groin radiating down medial leg. Notes incontinence occasionally. Numbness and weakness as well.   Function: limited due to pain  Interval History: no changes since last office visit. Worsening radicular pain from back to groin.  Also notes \"lava\" like sensation of paresthesias extending from her upper thigh into her lower leg.  She has no paresthesias of her left lower extremity.  Denies anesthesia or dysesthesia.  She has however noted 2 episodes of incontinence that has occurred in conjunction with right lower extremity paresthesias.  Denies any vision changes.    Father with history of multiple sclerosis    CC: Ankle pain L>R  Regarding the bilateral ankle,  Onset: gradual, worsening over the past~1-2 years  Location: bilateral posterior ankle, Achilles insertion, today left>R  Quality: firey, burning, sharp, stabbing  Duration: 1.5 years   Severity: 8/10 at worst  Timing: worsens with walking   Modifying factors:  resting and non-use makes it better, movement and use makes it worse  Associated signs & symptoms: denies numbness or tingling   Previous similar pain: Yes - R Achilles tendon rupture June 2021, no treatment for this at the " time. Denies left ankle injuries  Treatments to date: compression stocking, calf stretching    Sprain also states that she has been approved for bariatric surgery.  She is planning to proceed with a gastric sleeve.  She is hoping that this happens in the next several months.  She is encouraged that this will help reduce some of the stress on her joints.    Additional medical/Social/Surgical histories reviewed in [facility] and updated as appropriate.    REVIEW OF SYSTEMS (10/12/2022)  CONSTITUTIONAL: Denies fever and weight loss  GASTROINTESTINAL: Denies abdominal pain, nausea, vomiting  MUSCULOSKELETAL: See HPI  SKIN: Denies any recent rash or lesion  NEUROLOGICAL: Denies numbness or focal weakness     PHYSICAL EXAM  LMP 04/04/2019     General  - normal appearance, in no obvious distress,ectomorphic habitus  Musculoskeletal - lumbar spine  - stance: slow to rise and sit  - inspection: normal bone and joint alignment, no obvious scoliosis  - palpation: bilateral lumbar paravertebral spasm, tenderness at lumbosacral region near L5 bilaterally  - ROM: pain with left rotation, flexion past 45 deg,   - strength: lower extremities 5/5 in all planes  - special tests:  (-) straight leg raise bilaterally  (-) slump test  Musculoskeletal -right hip  - stance: normal gait without limp  - inspection: no swelling or effusion,  normal bone and joint alignment, no obvious deformity  - palpation: no lateral or anterior hip tenderness  - ROM:  Pain and limited flexion past 90, pain and limited internal rotation. ER full and painless  - strength: 5/5 in all planes except hip flexion 4/5 with pain  - special tests:  (-) BENJA  (-) FADIR  no pain with axial femoral load  Musculoskeletal - foot / ankle bilaterally  - stance: normal gait without limp, difficulty performing heel raise on right  - inspection: No inspection, no visible deformity  - palpation: tender over mid substance Achilles tendon on the left at mid substance and distal  Achilles insertion.  Tender in the right ankle at distal Achilles insertion.  No visible Kelsie deformities.  - ROM:Full ankle ROM in all planes  - strength: 5/5 in all planes  Neuro  - no sensory or motor deficit, grossly normal coordination, normal muscle tone  Skin  - no ecchymosis, erythema, warmth, or induration, no obvious rash  Psych  - interactive, appropriate, normal mood and affect    IMAGING : X-ray bilateral ankle. Final results and radiologist's interpretation, available in the [facility] health record. Images were reviewed with the patient/family members in the office today. My personal interpretation of the performed imaging is enthesopathy seen at the calcaneus at Achilles insertion sites.  Plantar fascial enthesophytes.  No significant degenerative changes seen.     ASSESSMENT & PLAN  Ms. Brasher is a 52 year old year old female with past medical history of left Achilles injury in 2019, morbid obesity presenting for follow-up regarding right hip pain, paresthesias affecting right lower extremity as well as bilateral Achilles tendinitis.      MRI of the lumbar spine does not explain her current pattern of paresthesias and episode of incontinence.  No significant foraminal or spinal stenosis from MRI in May.  Discussed consideration for neurology referral to further investigate this because she does have a family history of paternal MS.  May consider EMG as well.    Diagnosis:  Mild osteoarthritis of right hip  Achilles tendinopathy bilateral ankles  Paresthesias affecting right lower extremity  Morbid Obesity    Regard to her hip pain, she is hoping her bariatric surgery can help improve symptomatology.  She is interested at this time to consider formal physical therapy, which she was unable to attend prior.  Consider fluoroscopic guided injection if no improvement with above.    Lastly we discussed consideration for physical therapy.  Orders placed.  She will begin using heel lifts and counseled regarding  appropriate footwear with a larger heel-to-toe drop.      Encouraged her that bariatric surgery will help offload her weightbearing joints and tendons and improve her ability for low-impact exercise.    It was a pleasure seeing Tiffani.    Arden Dee DO, [facility]  Primary Care Sports Medicine          Again, thank you for allowing me to participate in the care of your patient.        Sincerely,        Arden Dee DO

## 2022-10-14 ENCOUNTER — TELEPHONE (OUTPATIENT)
Dept: FAMILY MEDICINE | Facility: CLINIC | Age: 52
End: 2022-10-14

## 2022-10-14 ENCOUNTER — VIRTUAL VISIT (OUTPATIENT)
Dept: FAMILY MEDICINE | Facility: CLINIC | Age: 52
End: 2022-10-14
Payer: COMMERCIAL

## 2022-10-14 DIAGNOSIS — F41.1 GAD (GENERALIZED ANXIETY DISORDER): Primary | ICD-10-CM

## 2022-10-14 DIAGNOSIS — F43.10 PTSD (POST-TRAUMATIC STRESS DISORDER): ICD-10-CM

## 2022-10-14 LAB — DEPRECATED CALCIDIOL+CALCIFEROL SERPL-MC: 15 UG/L (ref 20–75)

## 2022-10-14 PROCEDURE — 99213 OFFICE O/P EST LOW 20 MIN: CPT | Mod: 95 | Performed by: PHYSICIAN ASSISTANT

## 2022-10-14 RX ORDER — ALPRAZOLAM 0.25 MG
0.25 TABLET ORAL DAILY PRN
Qty: 15 TABLET | Refills: 0 | Status: SHIPPED | OUTPATIENT
Start: 2022-10-14 | End: 2023-05-31

## 2022-10-14 NOTE — TELEPHONE ENCOUNTER
Pt called the clinic stating she needs to get Alprazolam. Pt stated she had an E-visit with Dr. Barry but never heard anything back.     Per Evisit from 9/19/22- pt to make an appt to discuss mood complaints.     Scheduled pt for a VV with Kye Montalvo for today 10/14/22 to discuss mood complaints as pts PCP is not in the office today.     Pt states there is detailed notes from her counselor's and from [facility] about this.

## 2022-10-14 NOTE — PROGRESS NOTES
"Tiffani is a 52 year old who is being evaluated via a billable video visit.      How would you like to obtain your AVS? MyChart  If the video visit is dropped, the invitation should be resent by: Other e-mail: MYCHART  Will anyone else be joining your video visit? No        Assessment & Plan     JAXON (generalized anxiety disorder)  PTSD (post-traumatic stress disorder)    Alprazolam 0.25 mg daily PRN for anxiety #15 for now. Has f/u Monday with Gem. Placed a referral for a mental health consultation for in the future. Continue close contact with her therapist. Reviewed med safety. Expressed to her that she may follow-up with me given the absence currently of her PCP for hopefully more continuity of care until their return.     - ALPRAZolam (XANAX) 0.25 MG tablet  Dispense: 15 tablet; Refill: 0  - Adult Mental Health  Referral    13 mins spent on the video call in additional to >10 mins spent on chart review/documentation.      BMI:   Estimated body mass index is 47.76 kg/m  as calculated from the following:    Height as of 10/13/22: 1.765 m (5' 9.49\").    Weight as of 10/13/22: 148.8 kg (328 lb).   Weight management plan: Patient referred to endocrine and/or weight management specialty Discussed healthy diet and exercise guidelines    No follow-ups on file.    The likelihood of other entities in the differential is insufficient to justify any further testing for them at this time. This was explained to the patient. The patient was advised that persistent or worsening symptoms would require further evaluation. Patient advised to call the office and if unable to reach to go to the emergency room if they develop any new or worsening symptoms. Expressed understanding and agreement with above stated plan.     Kye Montalvo PA-C  [facility] ELA Nixon is a pleasant 52 year old presenting for the following health issues:  Refill Request (Alprazolam)      HPI     History of " Depression/Anxiety/PTSD. Stable on current medications. Previously took Lorazepam PRN for break through anxiety symptoms. Mother recently diagnosed with Multiple Myeloma. Been a stressful period and has been out of her medication. Did take her one of sister's Alprazolam at one point and felt it had a faster onset. No side effects. Her PCP has been on leave and has had difficulties connecting with a prescriber. Today she states is a good day. Denies SI/HI.     Does see our clinical pharmacist Gem Hernandez PharmD and outlines this potential switch from Lorazepam -> Alprazolam in her note on 7/18. Patient is aware of the side effects of benzo use and knows proper use of these medications. Does see a behavioral therapist 1-2 times a week. Potential for referral to psychiatry which her and her PCP did discuss previously.    Review of Systems   Constitutional, HEENT, cardiovascular, pulmonary, GI, , musculoskeletal, neuro, skin, endocrine and psych systems are negative, except as otherwise noted.      Objective         Vitals:  No vitals were obtained today due to virtual visit.    Physical Exam   GENERAL: Healthy, alert and no distress  EYES: Eyes grossly normal to inspection.  No discharge or erythema, or obvious scleral/conjunctival abnormalities.  RESP: No audible wheeze, cough, or visible cyanosis.  No visible retractions or increased work of breathing.    SKIN: Visible skin clear. No significant rash, abnormal pigmentation or lesions.  NEURO: Cranial nerves grossly intact.  Mentation and speech appropriate for age.  PSYCH: Mentation appears normal, affect normal/bright, judgement and insight intact, normal speech and appearance well-groomed.      Current Outpatient Medications   Medication Sig Dispense Refill     ALPRAZolam (XANAX) 0.25 MG tablet Take 1 tablet (0.25 mg) by mouth daily as needed for anxiety 15 tablet 0     aspirin (ASA) 81 MG EC tablet Take 1 tablet (81 mg) by mouth daily 90 tablet 1      atorvastatin (LIPITOR) 40 MG tablet Take 1 tablet (40 mg) by mouth daily 90 tablet 1     Multiple Vitamins-Iron (QC DAILY MULTIVITAMINS/IRON) TABS Take 1 tablet by mouth daily 90 tablet 1     propranolol ER (INDERAL LA) 80 MG 24 hr capsule Take 1 capsule (80 mg) by mouth daily 90 capsule 0     sertraline (ZOLOFT) 100 MG tablet Take 1.5 tablets (150 mg) by mouth daily 45 tablet 2     UNABLE TO FIND MEDICATION NAME: Liquid iron - dose unknown       albuterol (PROAIR HFA/PROVENTIL HFA/VENTOLIN HFA) 108 (90 Base) MCG/ACT inhaler Inhale 2 puffs into the lungs every 6 hours (Patient not taking: Reported on 10/14/2022) 18 g 0     Calcium Carb-Cholecalciferol (CALCIUM 600/VITAMIN D) 600-400 MG-UNIT CHEW Take 1 tablet by mouth 2 times daily Take one twice daily and at least 2 hours apart from iron. (Patient not taking: Reported on 10/14/2022) 180 tablet 1     vitamin D2 (ERGOCALCIFEROL) 46133 units (1250 mcg) capsule Take 1 capsule (50,000 Units) by mouth once a week (Patient not taking: Reported on 10/14/2022) 12 capsule 0     Video-Visit Details    Video Start Time: 4:00 PM    Type of service:  Video Visit    Video End Time:4:13 PM    Originating Location (pt. Location): Home        Distant Location (provider location):  On-site    Platform used for Video Visit: Kenna

## 2022-10-15 LAB — FERRITIN SERPL-MCNC: 45 NG/ML (ref 8–252)

## 2022-10-17 ENCOUNTER — VIRTUAL VISIT (OUTPATIENT)
Dept: PSYCHIATRY | Facility: CLINIC | Age: 52
End: 2022-10-17
Attending: PHYSICIAN ASSISTANT
Payer: COMMERCIAL

## 2022-10-17 ENCOUNTER — TELEPHONE (OUTPATIENT)
Dept: SURGERY | Facility: CLINIC | Age: 52
End: 2022-10-17

## 2022-10-17 ENCOUNTER — VIRTUAL VISIT (OUTPATIENT)
Dept: PHARMACY | Facility: CLINIC | Age: 52
End: 2022-10-17
Payer: COMMERCIAL

## 2022-10-17 DIAGNOSIS — E78.5 HYPERLIPIDEMIA LDL GOAL <100: ICD-10-CM

## 2022-10-17 DIAGNOSIS — G47.00 INSOMNIA, UNSPECIFIED TYPE: Primary | ICD-10-CM

## 2022-10-17 DIAGNOSIS — F41.1 GAD (GENERALIZED ANXIETY DISORDER): ICD-10-CM

## 2022-10-17 DIAGNOSIS — G47.00 INSOMNIA, UNSPECIFIED TYPE: ICD-10-CM

## 2022-10-17 DIAGNOSIS — F41.1 GAD (GENERALIZED ANXIETY DISORDER): Primary | ICD-10-CM

## 2022-10-17 DIAGNOSIS — F43.10 PTSD (POST-TRAUMATIC STRESS DISORDER): ICD-10-CM

## 2022-10-17 PROCEDURE — 99205 OFFICE O/P NEW HI 60 MIN: CPT | Mod: 95 | Performed by: PSYCHIATRY & NEUROLOGY

## 2022-10-17 PROCEDURE — 99607 MTMS BY PHARM ADDL 15 MIN: CPT | Performed by: PHARMACIST

## 2022-10-17 PROCEDURE — 99606 MTMS BY PHARM EST 15 MIN: CPT | Performed by: PHARMACIST

## 2022-10-17 RX ORDER — MIRTAZAPINE 7.5 MG/1
7.5 TABLET, FILM COATED ORAL AT BEDTIME
Qty: 30 TABLET | Refills: 0 | Status: CANCELLED | OUTPATIENT
Start: 2022-10-17 | End: 2022-11-16

## 2022-10-17 RX ORDER — ROSUVASTATIN CALCIUM 20 MG/1
20 TABLET, COATED ORAL DAILY
Qty: 90 TABLET | Refills: 3 | Status: SHIPPED | OUTPATIENT
Start: 2022-10-17 | End: 2023-12-18

## 2022-10-17 RX ORDER — MIRTAZAPINE 15 MG/1
15 TABLET, FILM COATED ORAL AT BEDTIME
Qty: 30 TABLET | Refills: 2 | Status: SHIPPED | OUTPATIENT
Start: 2022-10-17 | End: 2023-04-25

## 2022-10-17 ASSESSMENT — ANXIETY QUESTIONNAIRES
2. NOT BEING ABLE TO STOP OR CONTROL WORRYING: MORE THAN HALF THE DAYS
7. FEELING AFRAID AS IF SOMETHING AWFUL MIGHT HAPPEN: NEARLY EVERY DAY
8. IF YOU CHECKED OFF ANY PROBLEMS, HOW DIFFICULT HAVE THESE MADE IT FOR YOU TO DO YOUR WORK, TAKE CARE OF THINGS AT HOME, OR GET ALONG WITH OTHER PEOPLE?: VERY DIFFICULT
GAD7 TOTAL SCORE: 10
GAD7 TOTAL SCORE: 10
1. FEELING NERVOUS, ANXIOUS, OR ON EDGE: MORE THAN HALF THE DAYS
3. WORRYING TOO MUCH ABOUT DIFFERENT THINGS: MORE THAN HALF THE DAYS
6. BECOMING EASILY ANNOYED OR IRRITABLE: NOT AT ALL
4. TROUBLE RELAXING: SEVERAL DAYS
7. FEELING AFRAID AS IF SOMETHING AWFUL MIGHT HAPPEN: NEARLY EVERY DAY
5. BEING SO RESTLESS THAT IT IS HARD TO SIT STILL: NOT AT ALL
IF YOU CHECKED OFF ANY PROBLEMS ON THIS QUESTIONNAIRE, HOW DIFFICULT HAVE THESE PROBLEMS MADE IT FOR YOU TO DO YOUR WORK, TAKE CARE OF THINGS AT HOME, OR GET ALONG WITH OTHER PEOPLE: VERY DIFFICULT
GAD7 TOTAL SCORE: 10

## 2022-10-17 NOTE — Clinical Note
Michelle Fishman,  I am providing psychiatric consultation for this patient.  We will undergo a course of cognitive behavioral therapy for insomnia prior to trying any medication.  She did not want to try mirtazapine given the chance of weight gain.  Once consultation is complete we will return her to you.  By any chance to you know when her PCP might be returning?   Thanks, Sincerely, Curtis Bryson M.D. Consultative Psychiatrist Program Medical Director, [facility]

## 2022-10-17 NOTE — PATIENT INSTRUCTIONS
"Recommendations from today's MTM visit:                                                    MTM (medication therapy management) is a service provided by a clinical pharmacist designed to help you get the most of out of your medicines.   Today we reviewed what your medicines are for, how to know if they are working, that your medicines are safe and how to make your medicine regimen as easy as possible.      1. Discontinue atorvastatin   2. Start rosuvastatin 20mg daily  3. Start mirtazapine 15mg daily at bedtime- see if this helps with sleep and anxiety, if you are finding it does not help we can make adjustments.      Follow-up: Via "[employer] in 2 weeks to check in on new medications.   Psychiatry appointment today.     It was great speaking with you today.  I value your experience and would be very thankful for your time in providing feedback in our clinic survey. In the next few days, you may receive an email or text message from [employer] with a link to a survey related to your  clinical pharmacist.\"     To schedule another MTM appointment, please call the clinic directly or you may call the MTM scheduling line at [phone] or toll-free at [phone].     My Clinical Pharmacist's contact information:                                                      Please feel free to contact me with any questions or concerns you have.      Kristin Ferrara, PharmD  Medication Therapy Management Resident  [phone]    Gem Moran PharmD, [facility]  Medication Therapy Management Provider  Pager: [phone]    "

## 2022-10-17 NOTE — TELEPHONE ENCOUNTER
Called PT to inform her that we are waiting on Dr. Moya to review her labs that came in over the weekend and are hoping to hear from her by Thursday.

## 2022-10-17 NOTE — PROGRESS NOTES
Tiffani Brasher is a 52 year old who is being evaluated via a billable video visit.      Pt will join video visit via: [employer]  If there are problems joining the visit, send backup video invite via: Text to preferred phone: [phone]    Reason for telehealth visit: Patient convenience (e.g. access to timely appointments / distance to available provider)    Originating location (patient location): Patient's home    Will anyone else be joining the visit? No
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
## 2022-10-17 NOTE — PROGRESS NOTES
"Medication Therapy Management (MTM) Encounter    ASSESSMENT:                            Medication Adherence/Access: {adherencechoices:909201}    ***: ***    ***: ***    ***: ***    PLAN:                            ***    Follow-up: {followuptest2:874831}    SUBJECTIVE/OBJECTIVE:                          Tiffani Brasher is a 52 year old female contacted via secure video for a follow-up visit.  Today's visit is a follow-up MTM visit from 7/18/22     Reason for visit: ***.    Allergies/ADRs: {1/2/3/4/5:505020}  Past Medical History: {1/2/3/4/5:670153}  Tobacco: She reports that she has never smoked. She has never used smokeless tobacco.  Alcohol: {ALCOHOL CONSUMPTION HX:538333}  {Social and Goals:469353}    Medication Adherence/Access: {fumedadherence:607141}    Anxiety: Current therapy includes alprazolam 0.25mg as needed (recently changed from lorazepam) and sertraline 150mg daily    Hyperlipidemia: Current therapy includes atorvastatin 40mg daily.  Patient reports {mtmlipidsideeffect:386876}  {lipidascvd:594658}  Recent Labs   Lab Test 03/21/22  1224 08/30/21  1559   CHOL 280* 280*   HDL 61 69   * 176*   TRIG 208* 177*     ***: ***    Today's Vitals: LMP 04/04/2019   ----------------  {VANDANA?:976540}    I spent {mtm total time 3:304138} with this patient today{MTMpartdbillingquestion:593512}. { :805253}. A copy of the visit note was provided to the patient's provider(s).    The patient {GIVEN/NOT GIVEN:937826::\"was given\"} a summary of these recommendations. {covisit:500319}    ***    Telemedicine Visit Details  Type of service:  {telemedvisitmtm:050279::\"Telephone visit\"}  Start Time: {video/phone visit start time:152948}  End Time: {video/phone visit end time:152948}  Originating Location (pt. Location): {video visit patient location:133886::\"Home\"}  {PROVIDER LOCATION On-site should be selected for visits conducted from your clinic location or adjoining [facility] hospital, academic office, or other nearby [facility] " "building. Off-site should be selected for all other provider locations, including home:116793}    Distant Location (provider location):  {virtual location provider:567926}  Provider has received verbal consent for a visit from the patient? {YES-NO  Default Yes:4444::\"Yes\"}     Medication Therapy Recommendations  No medication therapy recommendations to display     "

## 2022-10-17 NOTE — PROGRESS NOTES
Medication Therapy Management (MTM) Encounter    ASSESSMENT:                            Medication Adherence/Access: No issues identified    Anxiety: Has increased recently due to an increase in triggers. Feels more comfortable now that she has alprazolam on hand if needed but has not used yet. Will continue to monitor. Plan in place for patient to meet with new psychiatrist.     Hyperlipidemia: Patient is slightly above LDL goal of <100. Patient reports many muscle pains with atorvastatin. Would recommend a trial of rosuvastatin as this is less lipophilic when compared to atorvastatin and thus less risk of muscle pains.      Insomnia: Patient's insomnia has increased, would benefit from a trial of mirtazapine to help with ongoing anxiety and insomnia (and hopefully lead to less use of alprazolam with time).  Will continue to monitor anxiety, sleep, and weight and make adjustments as needed.     PLAN:                            1. Discontinue atorvastatin   2. Start rosuvastatin 20mg daily  3. Start mirtazapine 15mg daily at bedtime- see if this helps with sleep and anxiety, if you are finding it does not help we can make adjustments.     Follow-up: Via [employer] in 2 weeks to check in on new medications.   Psychiatry appointment today.     SUBJECTIVE/OBJECTIVE:                          Tiffani Brasher is a 52 year old female contacted via secure video for a follow-up visit.  Today's visit is a follow-up MTM visit from 7/18/22.     Reason for visit: Routine follow up.    Allergies/ADRs: Reviewed in chart  Past Medical History: Reviewed in chart  Tobacco: She reports that she has never smoked. She has never used smokeless tobacco.    Medication Adherence/Access: no issues reported    Anxiety: Current therapy includes alprazolam 0.25mg as needed (recently changed from lorazepam) and sertraline 150mg daily. 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 Overall she has triggers that increase her anxiety. She has an appointment with new psychiatrist later today.     Hyperlipidemia: Current therapy includes atorvastatin 40mg- has been taking about 3 to 4 times a week because it makes her feel worse/sore when she takes it. \"It makes me feel like I was hit by a truck.\" Does not feel muscle pains when she skips a dose.   The 10-year ASCVD risk score (Felicia SCHROEDER, et al., 2019) is: 2.9%    Values used to calculate the score:      Age: 52 years      Sex: Female      Is Non- : No      Diabetic: No      Tobacco smoker: No      Systolic Blood Pressure: 133 mmHg      Is BP treated: Yes      HDL Cholesterol: 61 mg/dL      Total Cholesterol: 280 mg/dL  Recent Labs   Lab Test 03/21/22  1224 08/30/21  1559   CHOL 280* 280*   HDL 61 69   * 176*   TRIG 208* 177*     Insomnia: Patient is currently not taking any medications, has used hydroxyzine and ramelteon in the past which were effective. Overall feels that it has been harder to get good sleep recently. Is open to trying an agent that would help with sleep and anxiety.     Today's Vitals: LMP 04/04/2019   ----------------    I spent 25 minutes with this patient today. All changes were made via collaborative practice agreement with Dr. Phillips. A copy of the visit note was provided to the patient's provider(s).    The patient was sent via [employer] a summary of these recommendations.     Paula RutledgeD  Medication Therapy Management Resident  [phone]    Gem Moran PharmD, [facility]  Medication Therapy Management Provider  Pager: [phone]     Telemedicine Visit Details  Type of service:  Video Conference via [employer]  Start Time: 10:23 AM  End Time: 10:48 AM  Originating Location (pt. Location): Home      Distant Location (provider location):  On-site  Provider has received verbal consent for a visit from the patient? Yes     Medication Therapy " Recommendations  JAXON (generalized anxiety disorder)    Current Medication: LORazepam (ATIVAN) 0.5 MG tablet (Discontinued)   Rationale: More effective medication available - Ineffective medication - Effectiveness   Recommendation: Change Medication - ALPRAZolam 0.25 MG tablet   Status: Accepted per Provider         Hyperlipidemia LDL goal <100    Rationale: Undesirable effect - Adverse medication event - Safety   Recommendation: Change Medication - rosuvastatin 20 MG tablet   Status: Accepted per CPA         Insomnia, unspecified type    Rationale: Untreated condition - Needs additional medication therapy - Indication   Recommendation: Start Medication - mirtazapine 7.5 MG tablet   Status: Accepted per CPA

## 2022-10-18 ENCOUNTER — TELEPHONE (OUTPATIENT)
Dept: SURGERY | Facility: CLINIC | Age: 52
End: 2022-10-18

## 2022-10-18 DIAGNOSIS — E66.01 MORBID OBESITY WITH BMI OF 45.0-49.9, ADULT (H): ICD-10-CM

## 2022-10-18 RX ORDER — ERGOCALCIFEROL 1.25 MG/1
50000 CAPSULE, LIQUID FILLED ORAL
Qty: 12 CAPSULE | Refills: 0 | Status: SHIPPED | OUTPATIENT
Start: 2022-10-18 | End: 2023-04-25

## 2022-10-18 NOTE — TELEPHONE ENCOUNTER
Called pt and discussed Dr. Moya's lab note:  Your vitamin D level continues to be very low. Increase your vitamin D 50,000 international unit(s) to 3 x per week. I sent a new prescription to your pharmacy. Your ferritin is also low. Please start taking vitron C iron tablet once a day.    Informed pt that Vitron C is iron and vitamin C to assist with absorption and can be purchased OTC.    Informed pt that  msg was sent as pt was calling to check in.  Team [facility] will call her in next couple of days as he is out ill.    Discussed where to find surgeon info.  Patient verbalized understanding and is agreeable to plan.  Kimberly Mahan, MS, RD, RN

## 2022-10-18 NOTE — TELEPHONE ENCOUNTER
Patient called requesting to speak with Dr Moya, patient declined to provide further information. Patient also would like to speak with Jono.    [phone] okay to leave VM

## 2022-10-19 ENCOUNTER — TELEPHONE (OUTPATIENT)
Dept: SURGERY | Facility: CLINIC | Age: 52
End: 2022-10-19

## 2023-01-27 DIAGNOSIS — F41.1 GAD (GENERALIZED ANXIETY DISORDER): ICD-10-CM

## 2023-01-27 DIAGNOSIS — I10 ESSENTIAL HYPERTENSION: ICD-10-CM

## 2023-01-27 RX ORDER — PROPRANOLOL HYDROCHLORIDE 80 MG/1
80 CAPSULE, EXTENDED RELEASE ORAL DAILY
Qty: 90 CAPSULE | Refills: 0 | Status: SHIPPED | OUTPATIENT
Start: 2023-01-27 | End: 2023-01-27

## 2023-01-27 RX ORDER — PROPRANOLOL HYDROCHLORIDE 80 MG/1
80 CAPSULE, EXTENDED RELEASE ORAL DAILY
Qty: 90 CAPSULE | Refills: 0 | Status: ON HOLD | OUTPATIENT
Start: 2023-01-27 | End: 2023-04-27

## 2023-01-27 RX ORDER — SERTRALINE HYDROCHLORIDE 100 MG/1
150 TABLET, FILM COATED ORAL DAILY
Qty: 135 TABLET | Refills: 2 | Status: SHIPPED | OUTPATIENT
Start: 2023-01-27 | End: 2023-01-27

## 2023-01-27 RX ORDER — SERTRALINE HYDROCHLORIDE 100 MG/1
150 TABLET, FILM COATED ORAL DAILY
Qty: 135 TABLET | Refills: 0 | Status: SHIPPED | OUTPATIENT
Start: 2023-01-27 | End: 2023-12-18

## 2023-01-27 NOTE — TELEPHONE ENCOUNTER
Patient calling to request refills for sertraline 100 mg and propranolol 80 mg. Patient stated [place] and is requesting prescriptions be sent to [facility] in [place]. Patient had no additional questions or concerns.     Scripts and pharmacy pended for review. Routing to refill pool.

## 2023-02-15 ENCOUNTER — OFFICE VISIT (OUTPATIENT)
Dept: SURGERY | Facility: CLINIC | Age: 53
End: 2023-02-15
Payer: COMMERCIAL

## 2023-02-15 VITALS
SYSTOLIC BLOOD PRESSURE: 130 MMHG | HEART RATE: 77 BPM | DIASTOLIC BLOOD PRESSURE: 80 MMHG | BODY MASS INDEX: 43.4 KG/M2 | HEIGHT: 69 IN | WEIGHT: 293 LBS

## 2023-02-15 DIAGNOSIS — E66.01 MORBID OBESITY WITH BMI OF 45.0-49.9, ADULT (H): Primary | ICD-10-CM

## 2023-02-15 PROCEDURE — 99215 OFFICE O/P EST HI 40 MIN: CPT | Performed by: SURGERY

## 2023-02-15 NOTE — PROGRESS NOTES
INITIAL NEUROLOGY CONSULTATION    DATE OF VISIT: 2/16/2023  CLINIC LOCATION: [facility]  MRN: [id number]  PATIENT NAME: Tiffani Brasher  YOB: 1970    REASON FOR VISIT: No chief complaint on file.    HISTORY OF PRESENT ILLNESS:                                                    Ms. Tiffani Brasher is 52 year old right handed female patient with past medical history of obesity, anxiety, depression, PTSD, obstructive sleep apnea, iron deficiency anemia and hypertension, who was seen today for right lower extremity paresthesia.    Per patient's report, in September-October 2022 she experienced 2 episodes of tingling/paresthesias affecting the lateral side of the right thigh along with 2 episodes of incontinence that occurred in conjunction with these paresthesias.  Father has history of multiple sclerosis.  She was referred to evaluate for possibility of demyelinating conditions.    Laboratory evaluation from March 2022 demonstrates elevated chloride of 110 and glucose of 114, low protein, elevated ), normal TSH (1.09), low vitamin D (17), and nonreactive hep C antibody.  In October 2022 her ferritin was 45, vitamin D was still low at 15, CBC was unremarkable, and PTH was normal.    Lumbar spine MRI from 5/15/2022 demonstrated degenerative changes, most pronounced at L5-S1 where there is mild to moderate bilateral foraminal compromise.  Images were personally reviewed and independently interpreted.    According to Care Everywhere, cervical spine CT from 10/19/2019 demonstrated degenerative changes of the cervical spine without acute fractures.  Head CT at that time was negative for acute pathology.    No additional useful information is available in Care Everywhere, which was reviewed.  PAST MEDICAL/SURGICAL HISTORY:                                                    I personally reviewed patient's past medical and surgical history with the patient at today's  visit.  MEDICATIONS:                                                    I personally reviewed patient's medications and allergies with the patient at today's visit.  ALLERGIES:                                                      Allergies   Allergen Reactions     No Known Allergies      EXAM:                                                    VITAL SIGNS:   LMP 04/04/2019   Mini-Cog Assessment:       General: pt is in NAD, cooperative.  Skin: normal turgor, moist mucous membranes, no lesions/rashes noticed.  HEENT: ATNC, EOMI, PERRL, white sclera, normal conjunctiva, no nystagmus or ptosis. No carotid bruits bilaterally.  Respiratory: lung sounds clear to auscultation bilaterally, no crackles, wheezes, rhonchi. Symmetric lung excursion, no accessory respiratory muscle use.  Cardiovascular: normal S1/S2, no murmurs/rubs/gallops.   Abdomen: Not distended.  : deferred.    Neurological:  Mental: alert, follows commands,  /5 with ***/3 on memory recall, no aphasia or dysarthria. Fund of knowledge is {MYAPPROPRIATE:802598}  Cranial Nerves:  CN II: visual acuity - able to accurately count fingers with each eye. Visual fields intact, fundi: discs sharp, no papilledema and normal vessels bilaterally.  CN III, IV, VI: EOM intact, pupils equal and reactive  CN V: facial sensation nl  CN VII: face symmetric, no facial droop  CN VIII: hearing normal  CN IX: palate elevation symmetric, uvula at midline  CN XI SCM normal, shoulder shrug nl  CN XII: tongue midline  Motor: Strength: 5/5 in all major groups of all extremities. Normal tone. No abnormal movements. No pronator drift b/l.  Reflexes: Triceps, biceps, brachioradialis, patellar, and achilles reflexes normal and symmetric. No clonus noted. Toes are down-going b/l.   Sensory: light touch, pinprick, and vibration intact. Romberg: negative.  Coordination: FNF and heel-shin tests intact b/l.   Gait:  Normal, able to tandem walk *** without difficulty.  DATA:    LABS/EEG/IMAGING/OTHER STUDIES: I reviewed pertinent medical records, as detailed in the history of present illness.  ASSESSMENT AND PLAN:      ASSESSMENT: Tiffani Brasher is a 52 year old female patient with listed above past medical history, who presents with ***.    We had a detailed discussion with the patient regarding her presenting complaints.  The neurological exam today is ***.    DIAGNOSES:  No diagnosis found.  PLAN: There are no Patient Instructions on file for this visit.    Total Time: *** minutes spent on the date of the encounter doing chart review, history and exam, documentation and further activities per the note.    Van Wade MD  [facility]rology  (Chart documentation was completed in part with Dragon voice-recognition software. Even though reviewed, some grammatical, spelling, and word errors may remain.)

## 2023-02-16 ENCOUNTER — OFFICE VISIT (OUTPATIENT)
Dept: URGENT CARE | Facility: URGENT CARE | Age: 53
End: 2023-02-16
Payer: COMMERCIAL

## 2023-02-16 ENCOUNTER — OFFICE VISIT (OUTPATIENT)
Dept: NEUROLOGY | Facility: CLINIC | Age: 53
End: 2023-02-16
Payer: COMMERCIAL

## 2023-02-16 VITALS
RESPIRATION RATE: 16 BRPM | DIASTOLIC BLOOD PRESSURE: 83 MMHG | WEIGHT: 293 LBS | HEART RATE: 69 BPM | TEMPERATURE: 98.2 F | BODY MASS INDEX: 46.22 KG/M2 | SYSTOLIC BLOOD PRESSURE: 122 MMHG | OXYGEN SATURATION: 98 %

## 2023-02-16 VITALS — SYSTOLIC BLOOD PRESSURE: 104 MMHG | DIASTOLIC BLOOD PRESSURE: 68 MMHG | OXYGEN SATURATION: 97 % | HEART RATE: 81 BPM

## 2023-02-16 DIAGNOSIS — R53.83 OTHER FATIGUE: ICD-10-CM

## 2023-02-16 DIAGNOSIS — R20.2 PARESTHESIA OF RIGHT LOWER EXTREMITY: Primary | ICD-10-CM

## 2023-02-16 DIAGNOSIS — R06.02 SOB (SHORTNESS OF BREATH): Primary | ICD-10-CM

## 2023-02-16 LAB
ERYTHROCYTE [DISTWIDTH] IN BLOOD BY AUTOMATED COUNT: 14.1 % (ref 10–15)
HCT VFR BLD AUTO: 40.7 % (ref 35–47)
HGB BLD-MCNC: 13.6 G/DL (ref 11.7–15.7)
MCH RBC QN AUTO: 29.9 PG (ref 26.5–33)
MCHC RBC AUTO-ENTMCNC: 33.4 G/DL (ref 31.5–36.5)
MCV RBC AUTO: 90 FL (ref 78–100)
PLATELET # BLD AUTO: 232 10E3/UL (ref 150–450)
RBC # BLD AUTO: 4.55 10E6/UL (ref 3.8–5.2)
WBC # BLD AUTO: 7.1 10E3/UL (ref 4–11)

## 2023-02-16 PROCEDURE — 36415 COLL VENOUS BLD VENIPUNCTURE: CPT | Performed by: PHYSICIAN ASSISTANT

## 2023-02-16 PROCEDURE — 99205 OFFICE O/P NEW HI 60 MIN: CPT | Performed by: PSYCHIATRY & NEUROLOGY

## 2023-02-16 PROCEDURE — 99214 OFFICE O/P EST MOD 30 MIN: CPT | Performed by: PHYSICIAN ASSISTANT

## 2023-02-16 PROCEDURE — 85027 COMPLETE CBC AUTOMATED: CPT | Performed by: PHYSICIAN ASSISTANT

## 2023-02-16 ASSESSMENT — ENCOUNTER SYMPTOMS
APPETITE CHANGE: 1
DIARRHEA: 0
FEVER: 0
JOINT SWELLING: 0
ABDOMINAL PAIN: 0
HEADACHES: 0
SORE THROAT: 0
SHORTNESS OF BREATH: 1
COUGH: 0
FATIGUE: 1
VOMITING: 0

## 2023-02-16 ASSESSMENT — PAIN SCALES - GENERAL: PAINLEVEL: EXTREME PAIN (8)

## 2023-02-16 NOTE — NURSING NOTE
"Tiffani Brasher is a 52 year old female who presents for:  Chief Complaint   Patient presents with     Numbness     R lower leg and family history of MS        Initial Vitals:  /68 (BP Location: Right arm, Patient Position: Chair, Cuff Size: Adult Large)   Pulse 81   LMP 04/04/2019   SpO2 97%  Estimated body mass index is 45.84 kg/m  as calculated from the following:    Height as of 2/15/23: 1.753 m (5' 9\").    Weight as of 2/15/23: 140.8 kg (310 lb 6.4 oz).. There is no height or weight on file to calculate BSA. BP completed using cuff size:BP CUFF SIZE:507::large  EMG May3rd July 21st. Return  MA  Comments:     Chai Pitts    "

## 2023-02-16 NOTE — PATIENT INSTRUCTIONS
AFTER VISIT SUMMARY (AVS):    At today's visit we thoroughly discussed various diagnostic possibilities for your symptoms, necessary evaluation, and the plan, which includes:  Orders Placed This Encounter   Procedures    EMG     No new medications.     Additional recommendations after the work-up.    Next follow-up appointment is in the next 6-8 weeks or earlier if needed.    Please do not hesitate to call me with any questions or concerns.    Thanks.

## 2023-02-16 NOTE — PROGRESS NOTES
SUBJECTIVE:   Tiffani Brasher is a 52 year old female presenting with a chief complaint of   Chief Complaint   Patient presents with     Fatigue     With some SOB sometimes, wants blood tests for iron/Hgb, taking OTC liquid iron most days- FYI was hospitalized for Influenza A while in [place] December 2022       She is an established patient of [place].  As above.  Patient's last HGB was 3/16 and was 13.1  Patient complains of SOB that is intermittent, fatigue since December.  No CP, no pitting edema        Review of Systems   Constitutional: Positive for appetite change and fatigue. Negative for fever.   HENT: Negative for ear pain and sore throat.    Respiratory: Positive for shortness of breath. Negative for cough.    Cardiovascular: Negative for chest pain.   Gastrointestinal: Negative for abdominal pain, diarrhea and vomiting.   Musculoskeletal: Negative for joint swelling.   Neurological: Negative for headaches.   All other systems reviewed and are negative.      Past Medical History:   Diagnosis Date     Anemia      Anemia, iron deficiency 4/9/2019     Essential hypertension 11/20/2020     JAXON (generalized anxiety disorder) 10/8/2018     Insomnia, unspecified type 11/20/2020     MAIN (obstructive sleep apnea) 4/13/2021     PTSD (post-traumatic stress disorder) 3/1/2019     Severe episode of recurrent major depressive disorder, without psychotic features (H) 10/8/2018     Family History   Problem Relation Age of Onset     Hypertension Mother      Multiple myeloma Mother      Sleep Apnea Sister      Obesity Sister      Current Outpatient Medications   Medication Sig Dispense Refill     aspirin (ASA) 81 MG EC tablet Take 1 tablet (81 mg) by mouth daily 90 tablet 1     Multiple Vitamins-Iron (QC DAILY MULTIVITAMINS/IRON) TABS Take 1 tablet by mouth daily 90 tablet 1     propranolol ER (INDERAL LA) 80 MG 24 hr capsule Take 1 capsule (80 mg) by mouth daily 90 capsule 0     rosuvastatin (CRESTOR) 20 MG tablet Take  1 tablet (20 mg) by mouth daily 90 tablet 3     sertraline (ZOLOFT) 100 MG tablet Take 1.5 tablets (150 mg) by mouth daily Office visit required for further refills 135 tablet 0     ALPRAZolam (XANAX) 0.25 MG tablet Take 1 tablet (0.25 mg) by mouth daily as needed for anxiety (Patient not taking: Reported on 2/16/2023) 15 tablet 0     Calcium Carb-Cholecalciferol (CALCIUM 600/VITAMIN D) 600-400 MG-UNIT CHEW Take 1 tablet by mouth 2 times daily Take one twice daily and at least 2 hours apart from iron. (Patient not taking: Reported on 2/16/2023) 180 tablet 1     mirtazapine (REMERON) 15 MG tablet Take 1 tablet (15 mg) by mouth At Bedtime (Patient not taking: Reported on 2/15/2023) 30 tablet 2     UNABLE TO FIND MEDICATION NAME: Liquid iron - dose unknown       vitamin D2 (ERGOCALCIFEROL) 92254 units (1250 mcg) capsule Take 1 capsule (50,000 Units) by mouth three times a week (Patient not taking: Reported on 2/16/2023) 12 capsule 0     Social History     Tobacco Use     Smoking status: Never     Smokeless tobacco: Never   Substance Use Topics     Alcohol use: Yes     Comment: 9-10 glasses per month       OBJECTIVE  /83 (BP Location: Right arm, Patient Position: Sitting, Cuff Size: Adult Regular)   Pulse 69   Temp 98.2  F (36.8  C) (Oral)   Resp 16   Wt 142 kg (313 lb)   LMP 04/04/2019   SpO2 98%   BMI 46.22 kg/m      Physical Exam  Vitals and nursing note reviewed.   Constitutional:       Appearance: Normal appearance. She is obese.   Eyes:      Extraocular Movements: Extraocular movements intact.      Conjunctiva/sclera: Conjunctivae normal.   Cardiovascular:      Rate and Rhythm: Normal rate and regular rhythm.      Pulses: Normal pulses.      Heart sounds: Normal heart sounds.   Pulmonary:      Effort: Pulmonary effort is normal.      Breath sounds: Normal breath sounds.   Skin:     General: Skin is warm and dry.   Neurological:      General: No focal deficit present.      Mental Status: She is alert.    Psychiatric:         Mood and Affect: Mood normal.         Behavior: Behavior normal.         Labs:  Results for orders placed or performed in visit on 02/16/23 (from the past 24 hour(s))   CBC with platelets   Result Value Ref Range    WBC Count 7.1 4.0 - 11.0 10e3/uL    RBC Count 4.55 3.80 - 5.20 10e6/uL    Hemoglobin 13.6 11.7 - 15.7 g/dL    Hematocrit 40.7 35.0 - 47.0 %    MCV 90 78 - 100 fL    MCH 29.9 26.5 - 33.0 pg    MCHC 33.4 31.5 - 36.5 g/dL    RDW 14.1 10.0 - 15.0 %    Platelet Count 232 150 - 450 10e3/uL       X-Ray was not done.    ASSESSMENT:      ICD-10-CM    1. SOB (shortness of breath)  R06.02 Hemoglobin     CANCELED: Hemoglobin      2. MAIN (obstructive sleep apnea)  G47.33            Medical Decision Making:    Differential Diagnosis:  Depression, hypoventilation syndrome, anemia    Serious Comorbid Conditions:  Adult:  reviewed    PLAN:    Patient reassurance.  Recommended f/u with pcp.  Discussed reasons to seek immediate medical attention.        Followup:    If not improving or if condition worsens, follow up with your Primary Care Provider, If not improving or if conditions worsens over the next 12-24 hours, go to the Emergency Department    There are no Patient Instructions on file for this visit.

## 2023-02-16 NOTE — PROGRESS NOTES
"Dear Dr. Phillips, Megan Elizabeth,      Referring provider:       4/21/2021   Who referred you? Dr. Phillips     I was asked to see the patient regarding obesity by the referring provider above.    I had the pleasure of meeting with your patient Tiffani Brasher in our weight loss surgery office.  This patient is a 52 year old female who has been undergoing our thorough preoperative screening process in anticipation of potential bariatric surgery.    At evaluation we recorded Tiffani Brasher's Height: 175.3 cm (5' 9\"), 310 lbs and 45.84 BMI.  The patient has been unsuccessful with other methods of permanent weight loss and suffers from multiple weight related medical conditions.  Due to lack of success in achieving weight loss through other methods, she is interested in undergoing bariatric surgery.    PREVIOUS WEIGHT LOSS ATTEMPTS:     4/21/2021   I have tried the following methods to lose weight Watching portions or calories, Exercise, Weight Watchers, Atkins type diet (low carb/high protein), OTC Medications, Prescription Medications, Physician directed program       CO-MORBIDITIES OF OBESITY INCLUDE:     4/21/2021   I have the following health issues associated with obesity: High Blood Pressure, High Cholesterol, Sleep Apnea, Polycystic Ovarian Syndrome, Infertility, Stress Incontinence       VITALS:  /80   Pulse 77   Ht 1.753 m (5' 9\")   Wt 140.8 kg (310 lb 6.4 oz)   LMP 04/04/2019   BMI 45.84 kg/m      PE:  GENERAL: Alert and oriented x3. NAD  HEENT exam: Sclerae not icteric. Hearing good. Head normocephalic and atraumatic.   CARDIOVASCULAR: No JVD  RESPIRATORY: Breathing unlabored  GASTROINTESTINAL: Obese  LOWER EXTREMITIES: no deformities  MUSCULOSKELETAL: Normal gait, Moves all 4 extremities equal and strong  NEUROLOGIC: no gross defect  SKIN: warm and dry to touch     In summary, she has undergone an appropriate medical evaluation, dietitian evaluation, as well as psychologic screening. The " patient appears to be an appropriate candidate for bariatric surgery.    In the office today, I discussed the laparoscopic gastric sleeve surgery.  Risks, benefits and anticipated outcomes were outlined including the risk of death, staple line leak (1-2%), PE, DVT, ulcer, worsening GERD, N/V, stricture, hernia, wound infection, weight regain, and vitamin deficiencies. This patient has a good chance of sustaining permanent weight loss due to this procedure.  This should also allow improvement if not resolution of his/her weight related medical conditions.    At present we are going to present your patient's file for prior authorization to insurance.  Pending prior authorization, I anticipate a surgical date in the near future.  We will keep you updated on any progress.  If you have questions regarding care please feel free to contact me.  Total time spent in the clinic was 60 minutes with greater than 50% in face-to-face consultation.        Sincerely,    Al Martinez MD    Please route or send letter to:  Primary Care Provider (PCP) and Referring Provider

## 2023-02-16 NOTE — PROGRESS NOTES
INITIAL NEUROLOGY CONSULTATION    DATE OF VISIT: 2/16/2023  CLINIC LOCATION: [facility]  MRN: [id number]  PATIENT NAME: Tiffani Brasher  YOB: 1970    REASON FOR VISIT:   Chief Complaint   Patient presents with     Numbness     R lower leg and family history of MS     HISTORY OF PRESENT ILLNESS:                                                    Ms. Tiffani Brasher is 52 year old right handed female patient with past medical history of obesity, anxiety, depression, PTSD, obstructive sleep apnea, iron deficiency anemia and hypertension, who was seen today for right lower extremity paresthesia.    Per patient's report, she did not experience any previous neurological symptoms until her fall on September 17, 2021 when she landed on her stomach/pelvis area after tripping over and falling forward.  She could not identify any other possible causes.  Then, around January 2022, she started to experience intermittent itching/ticklish sensation along her right abdominal crease that extends from her groin to her back along with episodes of numbness/tingling and electric shooting pain along the medial aspect of the right thigh radiating down her forelegs/foot on the medial surface.  During these episodes she cannot trust her leg due to her symptoms even though she feels that the strength is relatively preserved.  With one of the episodes, due to pain she briefly lost control of her bladder, but it did not recur since.  Her mother noticed that occasionally the patient limps on the right leg.  She denies any additional focal neurological symptoms.    Father has history of multiple sclerosis.  She was referred to evaluate for possibility of demyelinating conditions.    Laboratory evaluation from March 2022 demonstrates elevated chloride of 110 and glucose of 114, low protein, elevated ), normal TSH (1.09), low vitamin D (17), and non-reactive hep C antibody.  In October 2022 her  ferritin was 45, vitamin D was still low at 15, CBC was unremarkable, and PTH was normal.    Lumbar spine MRI from 5/15/2022 demonstrated degenerative changes, most pronounced at L5-S1 where there is mild to moderate bilateral foraminal compromise.  Images were personally reviewed and independently interpreted.    According to Care Everywhere, cervical spine CT from 10/19/2019 demonstrated degenerative changes of the cervical spine without acute fractures.  Head CT at that time was negative for acute pathology.    No additional useful information is available in Care Everywhere, which was reviewed.  PAST MEDICAL/SURGICAL HISTORY:                                                    I personally reviewed patient's past medical and surgical history with the patient at today's visit.  MEDICATIONS:                                                    I personally reviewed patient's medications and allergies with the patient at today's visit.  ALLERGIES:                                                      Allergies   Allergen Reactions     No Known Allergies      EXAM:                                                    VITAL SIGNS:   /68 (BP Location: Right arm, Patient Position: Chair, Cuff Size: Adult Large)   Pulse 81   LMP 04/04/2019   SpO2 97%   Mini-Cog Assessment:  Mini Cog Assessment  Clock Draw Score: 2 Normal  3 Item Recall: 3 objects recalled  Mini Cog Total Score: 5  Administered by: : David    General: pt is in NAD, cooperative.  Skin: normal turgor, moist mucous membranes, no lesions/rashes noticed.  HEENT: ATNC, EOMI, PERRL, white sclera, normal conjunctiva, no nystagmus or ptosis. No carotid bruits bilaterally.  Respiratory: lung sounds clear to auscultation bilaterally, no crackles, wheezes, rhonchi. Symmetric lung excursion, no accessory respiratory muscle use.  Cardiovascular: normal S1/S2, no murmurs/rubs/gallops.   Abdomen: Not distended.  : deferred.    Neurological:  Mental: alert, follows  commands, Mini Cog Total Score: 5/5 with 3/3 on memory recall, no aphasia or dysarthria. Fund of knowledge is appropriate for age.  Cranial Nerves:  CN II: visual acuity - able to accurately count fingers with each eye. Visual fields intact, fundi: discs sharp, no papilledema and normal vessels bilaterally.  CN III, IV, VI: EOM intact, pupils equal and reactive  CN V: facial sensation nl  CN VII: face symmetric, no facial droop  CN VIII: hearing normal  CN IX: palate elevation symmetric, uvula at midline  CN XI SCM normal, shoulder shrug nl  CN XII: tongue midline  Motor: Strength: 5/5 in all major groups of both upper and left lower extremities.  There is questionable mild weakness versus give way weakness of hip flexion and knee extension of the right leg, the rest of the right lower extremity strength is 5/5. Normal tone. No abnormal movements. No pronator drift b/l.  Reflexes: Triceps, biceps, brachioradialis, patellar, and achilles reflexes normal and symmetric. No clonus noted. Toes are down-going b/l.   Sensory: light touch and pinprick are reduced over the medial thigh extending down the medial surface of the lower leg, vibration intact. Romberg: negative.  Coordination: FNF and heel-shin tests intact b/l.   Gait:  Normal, able to tandem walk without difficulty.  DATA:   LABS/EEG/IMAGING/OTHER STUDIES: I reviewed pertinent medical records, as detailed in the history of present illness.  ASSESSMENT AND PLAN:      ASSESSMENT: Tiffani Brasher is a 52 year old female patient with listed above past medical history, who presents with right leg paresthesias and possible mild weakness of the right iliopsoas and quadriceps femoris muscles in context of previous fall in September 2021.    We had a detailed discussion with the patient regarding her presenting complaints.  The neurological exam today is suggestive of possible involvement of femoral nerve versus lumbosacral plexopathy.  The presentation is not classic  for multiple sclerosis despite positive family history.  I ordered EMG for further clarification of her symptoms.  If negative, we might consider exploring central causes of her symptoms, including cervical, thoracic spine MRI and possibly brain MRI with and without contrast.    DIAGNOSES:    ICD-10-CM    1. Paresthesia of right lower extremity  R20.2 Adult Neurology  Referral     EMG        PLAN: At today's visit we thoroughly discussed various diagnostic possibilities for patient's symptoms, necessary evaluation, and the plan, which includes:  Orders Placed This Encounter   Procedures     EMG     No new medications.     Additional recommendations after the work-up.    Next follow-up appointment is in the next 6-8 weeks or earlier if needed.    Total Time: 69 minutes spent on the date of the encounter doing chart review, history and exam, documentation and further activities per the note.    Van Wade MD  [facility]  (Chart documentation was completed in part with Dragon voice-recognition software. Even though reviewed, some grammatical, spelling, and word errors may remain.)

## 2023-02-16 NOTE — LETTER
2/16/2023         RE: Tiffani Brasher  [address]  [facility] [address]        Dear Colleague,    Thank you for referring your patient, Tiffani Brasher, to the [facility]. Please see a copy of my visit note below.    INITIAL NEUROLOGY CONSULTATION    DATE OF VISIT: 2/16/2023  CLINIC LOCATION: [facility]  MRN: [id number]  PATIENT NAME: Tiffani Brasher  YOB: 1970    REASON FOR VISIT:   Chief Complaint   Patient presents with     Numbness     R lower leg and family history of MS     HISTORY OF PRESENT ILLNESS:                                                    Ms. Tiffani Brasher is 52 year old right handed female patient with past medical history of obesity, anxiety, depression, PTSD, obstructive sleep apnea, iron deficiency anemia and hypertension, who was seen today for right lower extremity paresthesia.    Per patient's report, she did not experience any previous neurological symptoms until her fall on September 17, 2021 when she landed on her stomach/pelvis area after tripping over and falling forward.  She could not identify any other possible causes.  Then, around January 2022, she started to experience intermittent itching/ticklish sensation along her right abdominal crease that extends from her groin to her back along with episodes of numbness/tingling and electric shooting pain along the medial aspect of the right thigh radiating down her forelegs/foot on the medial surface.  During these episodes she cannot trust her leg due to her symptoms even though she feels that the strength is relatively preserved.  With one of the episodes, due to pain she briefly lost control of her bladder, but it did not recur since.  Her mother noticed that occasionally the patient limps on the right leg.  She denies any additional focal neurological symptoms.    Father has history of multiple sclerosis.  She was referred to evaluate for  possibility of demyelinating conditions.    Laboratory evaluation from March 2022 demonstrates elevated chloride of 110 and glucose of 114, low protein, elevated ), normal TSH (1.09), low vitamin D (17), and non-reactive hep C antibody.  In October 2022 her ferritin was 45, vitamin D was still low at 15, CBC was unremarkable, and PTH was normal.    Lumbar spine MRI from 5/15/2022 demonstrated degenerative changes, most pronounced at L5-S1 where there is mild to moderate bilateral foraminal compromise.  Images were personally reviewed and independently interpreted.    According to Care Everywhere, cervical spine CT from 10/19/2019 demonstrated degenerative changes of the cervical spine without acute fractures.  Head CT at that time was negative for acute pathology.    No additional useful information is available in Care Everywhere, which was reviewed.  PAST MEDICAL/SURGICAL HISTORY:                                                    I personally reviewed patient's past medical and surgical history with the patient at today's visit.  MEDICATIONS:                                                    I personally reviewed patient's medications and allergies with the patient at today's visit.  ALLERGIES:                                                      Allergies   Allergen Reactions     No Known Allergies      EXAM:                                                    VITAL SIGNS:   /68 (BP Location: Right arm, Patient Position: Chair, Cuff Size: Adult Large)   Pulse 81   LMP 04/04/2019   SpO2 97%   Mini-Cog Assessment:  Mini Cog Assessment  Clock Draw Score: 2 Normal  3 Item Recall: 3 objects recalled  Mini Cog Total Score: 5  Administered by: : David    General: pt is in NAD, cooperative.  Skin: normal turgor, moist mucous membranes, no lesions/rashes noticed.  HEENT: ATNC, EOMI, PERRL, white sclera, normal conjunctiva, no nystagmus or ptosis. No carotid bruits bilaterally.  Respiratory: lung sounds  clear to auscultation bilaterally, no crackles, wheezes, rhonchi. Symmetric lung excursion, no accessory respiratory muscle use.  Cardiovascular: normal S1/S2, no murmurs/rubs/gallops.   Abdomen: Not distended.  : deferred.    Neurological:  Mental: alert, follows commands, Mini Cog Total Score: 5/5 with 3/3 on memory recall, no aphasia or dysarthria. Fund of knowledge is appropriate for age.  Cranial Nerves:  CN II: visual acuity - able to accurately count fingers with each eye. Visual fields intact, fundi: discs sharp, no papilledema and normal vessels bilaterally.  CN III, IV, VI: EOM intact, pupils equal and reactive  CN V: facial sensation nl  CN VII: face symmetric, no facial droop  CN VIII: hearing normal  CN IX: palate elevation symmetric, uvula at midline  CN XI SCM normal, shoulder shrug nl  CN XII: tongue midline  Motor: Strength: 5/5 in all major groups of both upper and left lower extremities.  There is questionable mild weakness versus give way weakness of hip flexion and knee extension of the right leg, the rest of the right lower extremity strength is 5/5. Normal tone. No abnormal movements. No pronator drift b/l.  Reflexes: Triceps, biceps, brachioradialis, patellar, and achilles reflexes normal and symmetric. No clonus noted. Toes are down-going b/l.   Sensory: light touch and pinprick are reduced over the medial thigh extending down the medial surface of the lower leg, vibration intact. Romberg: negative.  Coordination: FNF and heel-shin tests intact b/l.   Gait:  Normal, able to tandem walk without difficulty.  DATA:   LABS/EEG/IMAGING/OTHER STUDIES: I reviewed pertinent medical records, as detailed in the history of present illness.  ASSESSMENT AND PLAN:      ASSESSMENT: Tiffani Brasher is a 52 year old female patient with listed above past medical history, who presents with right leg paresthesias and possible mild weakness of the right iliopsoas and quadriceps femoris muscles in context of  previous fall in September 2021.    We had a detailed discussion with the patient regarding her presenting complaints.  The neurological exam today is suggestive of possible involvement of femoral nerve versus lumbosacral plexopathy.  The presentation is not classic for multiple sclerosis despite positive family history.  I ordered EMG for further clarification of her symptoms.  If negative, we might consider exploring central causes of her symptoms, including cervical, thoracic spine MRI and possibly brain MRI with and without contrast.    DIAGNOSES:    ICD-10-CM    1. Paresthesia of right lower extremity  R20.2 Adult Neurology  Referral     EMG        PLAN: At today's visit we thoroughly discussed various diagnostic possibilities for patient's symptoms, necessary evaluation, and the plan, which includes:  Orders Placed This Encounter   Procedures     EMG     No new medications.     Additional recommendations after the work-up.    Next follow-up appointment is in the next 6-8 weeks or earlier if needed.    Total Time: 69 minutes spent on the date of the encounter doing chart review, history and exam, documentation and further activities per the note.    Van Wade MD  [facility] Neurology  (Chart documentation was completed in part with Dragon voice-recognition software. Even though reviewed, some grammatical, spelling, and word errors may remain.)            Again, thank you for allowing me to participate in the care of your patient.        Sincerely,        Van Wade MD

## 2023-04-14 ENCOUNTER — VIRTUAL VISIT (OUTPATIENT)
Dept: SURGERY | Facility: CLINIC | Age: 53
End: 2023-04-14
Payer: COMMERCIAL

## 2023-04-14 DIAGNOSIS — E66.01 MORBID OBESITY WITH BMI OF 45.0-49.9, ADULT (H): Primary | ICD-10-CM

## 2023-04-14 PROCEDURE — 99207 PR NO CHARGE NURSE ONLY: CPT | Mod: 93

## 2023-04-14 NOTE — PROGRESS NOTES
Virtual bariatric pre op class completed.  Class power point and patient checklist information gone over with patient.    All questions were answered.  Quiz was completed.    Patient was advised to call if further questions.  Kimberly Mahan MS, RD, RN

## 2023-04-18 ENCOUNTER — MYC MEDICAL ADVICE (OUTPATIENT)
Dept: FAMILY MEDICINE | Facility: CLINIC | Age: 53
End: 2023-04-18
Payer: COMMERCIAL

## 2023-04-19 ENCOUNTER — TELEPHONE (OUTPATIENT)
Dept: PHARMACY | Facility: CLINIC | Age: 53
End: 2023-04-19
Payer: COMMERCIAL

## 2023-04-19 NOTE — TELEPHONE ENCOUNTER
We have been unable to reach this patient for MTM follow-up after several attempts. We will stop reaching out to the patient at this time. Please let us know if we can assist in this patient's care in the future.    Routing to PCP as Paula FieldsD, [facility]  Medication Therapy Management Provider  Pager: [phone]

## 2023-04-24 ENCOUNTER — OFFICE VISIT (OUTPATIENT)
Dept: FAMILY MEDICINE | Facility: CLINIC | Age: 53
End: 2023-04-24
Payer: COMMERCIAL

## 2023-04-24 VITALS
BODY MASS INDEX: 45.42 KG/M2 | OXYGEN SATURATION: 98 % | TEMPERATURE: 96.8 F | DIASTOLIC BLOOD PRESSURE: 84 MMHG | SYSTOLIC BLOOD PRESSURE: 129 MMHG | HEART RATE: 67 BPM | RESPIRATION RATE: 18 BRPM | WEIGHT: 293 LBS

## 2023-04-24 DIAGNOSIS — Z12.11 COLON CANCER SCREENING: ICD-10-CM

## 2023-04-24 DIAGNOSIS — Z12.4 CERVICAL CANCER SCREENING: ICD-10-CM

## 2023-04-24 DIAGNOSIS — F41.1 GAD (GENERALIZED ANXIETY DISORDER): ICD-10-CM

## 2023-04-24 DIAGNOSIS — Z01.818 PRE-OPERATIVE GENERAL PHYSICAL EXAMINATION: Primary | ICD-10-CM

## 2023-04-24 DIAGNOSIS — G47.33 OSA (OBSTRUCTIVE SLEEP APNEA): ICD-10-CM

## 2023-04-24 DIAGNOSIS — Z12.11 SCREEN FOR COLON CANCER: ICD-10-CM

## 2023-04-24 DIAGNOSIS — E66.01 MORBID OBESITY (H): ICD-10-CM

## 2023-04-24 DIAGNOSIS — E78.5 HYPERLIPIDEMIA LDL GOAL <100: ICD-10-CM

## 2023-04-24 DIAGNOSIS — I10 ESSENTIAL HYPERTENSION: ICD-10-CM

## 2023-04-24 LAB
ALBUMIN SERPL BCG-MCNC: 4.2 G/DL (ref 3.5–5.2)
ALP SERPL-CCNC: 78 U/L (ref 35–104)
ALT SERPL W P-5'-P-CCNC: 14 U/L (ref 10–35)
ANION GAP SERPL CALCULATED.3IONS-SCNC: 10 MMOL/L (ref 7–15)
AST SERPL W P-5'-P-CCNC: 24 U/L (ref 10–35)
BILIRUB SERPL-MCNC: 0.3 MG/DL
BUN SERPL-MCNC: 11.5 MG/DL (ref 6–20)
CALCIUM SERPL-MCNC: 9.7 MG/DL (ref 8.6–10)
CHLORIDE SERPL-SCNC: 107 MMOL/L (ref 98–107)
CHOLEST SERPL-MCNC: 262 MG/DL
CREAT SERPL-MCNC: 0.8 MG/DL (ref 0.51–0.95)
DEPRECATED HCO3 PLAS-SCNC: 26 MMOL/L (ref 22–29)
ERYTHROCYTE [DISTWIDTH] IN BLOOD BY AUTOMATED COUNT: 14.3 % (ref 10–15)
GFR SERPL CREATININE-BSD FRML MDRD: 88 ML/MIN/1.73M2
GLUCOSE SERPL-MCNC: 94 MG/DL (ref 70–99)
HCT VFR BLD AUTO: 45.4 % (ref 35–47)
HDLC SERPL-MCNC: 57 MG/DL
HGB BLD-MCNC: 14 G/DL (ref 11.7–15.7)
LDLC SERPL CALC-MCNC: 177 MG/DL
MCH RBC QN AUTO: 30.6 PG (ref 26.5–33)
MCHC RBC AUTO-ENTMCNC: 30.8 G/DL (ref 31.5–36.5)
MCV RBC AUTO: 99 FL (ref 78–100)
NONHDLC SERPL-MCNC: 205 MG/DL
PLATELET # BLD AUTO: 225 10E3/UL (ref 150–450)
POTASSIUM SERPL-SCNC: 4.3 MMOL/L (ref 3.4–5.3)
PROT SERPL-MCNC: 7.5 G/DL (ref 6.4–8.3)
RBC # BLD AUTO: 4.58 10E6/UL (ref 3.8–5.2)
SODIUM SERPL-SCNC: 143 MMOL/L (ref 136–145)
TRIGL SERPL-MCNC: 140 MG/DL
WBC # BLD AUTO: 6.1 10E3/UL (ref 4–11)

## 2023-04-24 PROCEDURE — 85027 COMPLETE CBC AUTOMATED: CPT | Performed by: INTERNAL MEDICINE

## 2023-04-24 PROCEDURE — 80061 LIPID PANEL: CPT | Performed by: INTERNAL MEDICINE

## 2023-04-24 PROCEDURE — 99214 OFFICE O/P EST MOD 30 MIN: CPT | Performed by: INTERNAL MEDICINE

## 2023-04-24 PROCEDURE — 36415 COLL VENOUS BLD VENIPUNCTURE: CPT | Performed by: INTERNAL MEDICINE

## 2023-04-24 PROCEDURE — 80053 COMPREHEN METABOLIC PANEL: CPT | Performed by: INTERNAL MEDICINE

## 2023-04-24 PROCEDURE — 93000 ELECTROCARDIOGRAM COMPLETE: CPT | Performed by: INTERNAL MEDICINE

## 2023-04-24 ASSESSMENT — PATIENT HEALTH QUESTIONNAIRE - PHQ9
SUM OF ALL RESPONSES TO PHQ QUESTIONS 1-9: 8
10. IF YOU CHECKED OFF ANY PROBLEMS, HOW DIFFICULT HAVE THESE PROBLEMS MADE IT FOR YOU TO DO YOUR WORK, TAKE CARE OF THINGS AT HOME, OR GET ALONG WITH OTHER PEOPLE: SOMEWHAT DIFFICULT
SUM OF ALL RESPONSES TO PHQ QUESTIONS 1-9: 8

## 2023-04-24 ASSESSMENT — PAIN SCALES - GENERAL: PAINLEVEL: NO PAIN (0)

## 2023-04-24 NOTE — PROGRESS NOTES
[address], [address]  [facility] [address]  Phone: [phone]  Primary Provider: Iliana Rider  Pre-op Performing Provider: ILIANA RIDER      PREOPERATIVE EVALUATION:  Today's date: 4/24/2023    Tiffani Brasher is a 52 year old female who presents for a preoperative evaluation.       View : No data to display.              Surgical Information:  Surgery/Procedure: Laparoscopic sleeve gastrectomy  Surgery Location: [facility]    Surgeon: Al Martinez MD  Surgery Date: 4/26/2023  Time of Surgery: 7:30 AM  Where patient plans to recover: At home with family  Fax number for surgical facility: Note does not need to be faxed, will be available electronically in Epic.    Assessment & Plan     The proposed surgical procedure is considered INTERMEDIATE risk.    Problem List Items Addressed This Visit        Respiratory    MAIN (obstructive sleep apnea)       Digestive    Morbid obesity (H)       Circulatory    Essential hypertension       Behavioral    JAXON (generalized anxiety disorder)   Other Visit Diagnoses     Pre-operative general physical examination    -  Primary    eval for sleeve gastrectomy    Relevant Orders    EKG 12-lead complete w/read - Clinics (Completed)    Comprehensive metabolic panel (BMP + Alb, Alk Phos, ALT, AST, Total. Bili, TP) (Completed)    CBC with platelets (Completed)    Screen for colon cancer        Cervical cancer screening        Hyperlipidemia LDL goal <100        Relevant Orders    Lipid panel reflex to direct LDL Fasting (Completed)                 Risks and Recommendations:  The patient has the following additional risks and recommendations for perioperative complications:   - Consult Hospitalist / IM to assist with post-op medical management  Obstructive Sleep Apnea:   Use CPAP Post op    Antiplatelet or Anticoagulation Medication Instructions:   - aspirin: Discontinue aspirin 7-10 days prior to  procedure to reduce bleeding risk. It should be resumed postoperatively.     Additional Medication Instructions:   - Beta Blockers: Continue taking on the day of surgery.   - Statins: Continue taking on the day of surgery.    - SSRIs, SNRIs, TCAs, Antipsychotics: Continue without modification.     RECOMMENDATION:  APPROVAL GIVEN to proceed with proposed procedure, without further diagnostic evaluation.          Subjective     HPI related to upcoming procedure:   Sleeve gastrectomy.  Patient denies any dyspnea shortness of breath chest pain palpitation presyncope or syncope.  Denies any blood clots.  Denies any bleeding disorder.  Denies any complication from anesthesia.  Her METS level at least 4-7.  Had cardiac stress test that was negative.      4/24/2023    11:21 AM   Preop Questions   1. Have you ever had a heart attack or stroke? No   2. Have you ever had surgery on your heart or blood vessels, such as a stent placement, a coronary artery bypass, or surgery on an artery in your head, neck, heart, or legs? No   3. Do you have chest pain with activity? No   4. Do you have a history of  heart failure? No   5. Do you currently have a cold, bronchitis or symptoms of other infection? No   6. Do you have a cough, shortness of breath, or wheezing? No   7. Do you or anyone in your family have previous history of blood clots? No   8. Do you or does anyone in your family have a serious bleeding problem such as prolonged bleeding following surgeries or cuts? No   9. Have you ever had problems with anemia or been told to take iron pills? YES -    10. Have you had any abnormal blood loss such as black, tarry or bloody stools, or abnormal vaginal bleeding? YES -    11. Have you ever had a blood transfusion? YES -    11a. Have you ever had a transfusion reaction? No   12. Are you willing to have a blood transfusion if it is medically needed before, during, or after your surgery? Yes   13. Have you or any of your relatives ever  had problems with anesthesia? No   14. Do you have sleep apnea, excessive snoring or daytime drowsiness? YES - uses CPAP   14a. Do you have a CPAP machine? Yes   15. Do you have any artifical heart valves or other implanted medical devices like a pacemaker, defibrillator, or continuous glucose monitor? No   16. Do you have artificial joints? No   17. Are you allergic to latex? No   18. Is there any chance that you may be pregnant? No     Health Care Directive:  Patient does not have a Health Care Directive or Living Will: Discussed advance care planning with patient; information given to patient to review.    Preoperative Review of :   reviewed - no record of controlled substances prescribed.      Status of Chronic Conditions:  DEPRESSION - Patient has a long history of Depression of moderate severity requiring medication for control with recent symptoms being stable..Current symptoms of depression include depressed mood, insomnia, anxiety.     HYPERLIPIDEMIA - Patient has a long history of significant Hyperlipidemia requiring medication for treatment with recent fair control. Patient reports SOME problems or side effects with the medication.     HYPERTENSION - Patient has longstanding history of HTN , currently denies any symptoms referable to elevated blood pressure. Specifically denies chest pain, palpitations, dyspnea, orthopnea, PND or peripheral edema. Blood pressure readings have been in normal range. Current medication regimen is as listed below. Patient denies any side effects of medication.     SLEEP PROBLEM - Patient has a longstanding history of snoring, excessive daytime somnolence, fatigue, lack of concentration, morning headache and depression and mood changes.. Patient has tried OTC medications with limited success.       Review of Systems  Constitutional, neuro, ENT, endocrine, pulmonary, cardiac, gastrointestinal, genitourinary, musculoskeletal, integument and psychiatric systems are  negative, except as otherwise noted.    Patient Active Problem List    Diagnosis Date Noted     MAIN (obstructive sleep apnea) 04/13/2021     Priority: Medium     Intertrigo 12/02/2020     Priority: Medium     Insomnia, unspecified type 11/20/2020     Priority: Medium     Essential hypertension 11/20/2020     Priority: Medium     Morbid obesity (H) 04/11/2019     Priority: Medium     Anemia, iron deficiency 04/09/2019     Priority: Medium     PTSD (post-traumatic stress disorder) 03/01/2019     Priority: Medium     JAXON (generalized anxiety disorder) 10/08/2018     Priority: Medium     Severe episode of recurrent major depressive disorder, without psychotic features (H) 10/08/2018     Priority: Medium      Past Medical History:   Diagnosis Date     Anemia      Anemia, iron deficiency 4/9/2019     Essential hypertension 11/20/2020     JAXON (generalized anxiety disorder) 10/8/2018     Insomnia, unspecified type 11/20/2020     MAIN (obstructive sleep apnea) 4/13/2021     PTSD (post-traumatic stress disorder) 3/1/2019     Severe episode of recurrent major depressive disorder, without psychotic features (H) 10/8/2018     Past Surgical History:   Procedure Laterality Date     GYN SURGERY  2007    myomectomy     LAPAROSCOPIC HYSTERECTOMY TOTAL N/A 4/23/2019    Procedure: single site total laparoscopic hysterectomy, lysis of adhesion, drainage of ovarian cyst;  Surgeon: Vicki Hamilton MD;  Location: [facility] OR     ORTHOPEDIC SURGERY Right     knee      Current Outpatient Medications   Medication Sig Dispense Refill     ALPRAZolam (XANAX) 0.25 MG tablet Take 1 tablet (0.25 mg) by mouth daily as needed for anxiety 15 tablet 0     Calcium Carb-Cholecalciferol (CALCIUM 600/VITAMIN D) 600-400 MG-UNIT CHEW Take 1 tablet by mouth 2 times daily Take one twice daily and at least 2 hours apart from iron. 180 tablet 1     Multiple Vitamins-Iron (QC DAILY MULTIVITAMINS/IRON) TABS Take 1 tablet by mouth daily 90 tablet 1     propranolol ER  (INDERAL LA) 80 MG 24 hr capsule Take 1 capsule (80 mg) by mouth daily 90 capsule 0     rosuvastatin (CRESTOR) 20 MG tablet Take 1 tablet (20 mg) by mouth daily 90 tablet 3     sertraline (ZOLOFT) 100 MG tablet Take 1.5 tablets (150 mg) by mouth daily Office visit required for further refills 135 tablet 0     UNABLE TO FIND MEDICATION NAME: Liquid iron - dose unknown       vitamin D2 (ERGOCALCIFEROL) 89038 units (1250 mcg) capsule Take 1 capsule (50,000 Units) by mouth three times a week 12 capsule 0     aspirin (ASA) 81 MG EC tablet Take 1 tablet (81 mg) by mouth daily (Patient not taking: Reported on 4/24/2023) 90 tablet 1     mirtazapine (REMERON) 15 MG tablet Take 1 tablet (15 mg) by mouth At Bedtime (Patient not taking: Reported on 2/15/2023) 30 tablet 2       Allergies   Allergen Reactions     No Known Allergies         Social History     Tobacco Use     Smoking status: Never     Smokeless tobacco: Never   Vaping Use     Vaping status: Never Used   Substance Use Topics     Alcohol use: Yes     Comment: 9-10 glasses per month     Family History   Problem Relation Age of Onset     Hypertension Mother      Multiple myeloma Mother      Sleep Apnea Sister      Obesity Sister      History   Drug Use Unknown     Comment: before left [place]  April 2018  legalized [place]          Objective     /84 (BP Location: Right arm, Patient Position: Sitting, Cuff Size: Adult Large)   Pulse 67   Temp 96.8  F (36  C) (Temporal)   Resp 18   Wt 139.5 kg (307 lb 9.6 oz)   LMP 04/04/2019   SpO2 98%   BMI 45.42 kg/m      Physical Exam    GENERAL APPEARANCE: healthy, alert and no distress, obese, very pleasant     EYES: EOMI, PERRL     HENT: ear canals and TM's normal and nose and mouth without ulcers or lesions     NECK: no adenopathy, no asymmetry, masses, or scars and thyroid normal to palpation     RESP: lungs clear to auscultation - no rales, rhonchi or wheezes     CV: regular rates and rhythm, normal S1 S2, no S3 or S4 and  no murmur, click or rub     ABDOMEN:  soft, nontender, no HSM or masses and bowel sounds normal     MS: extremities normal- no gross deformities noted, no evidence of inflammation in joints, FROM in all extremities.     SKIN: no suspicious lesions or rashes     NEURO: Normal strength and tone, sensory exam grossly normal, mentation intact and speech normal     PSYCH: mentation appears normal. and affect normal/bright     LYMPHATICS: No cervical adenopathy    Recent Labs   Lab Test 02/16/23  1658 10/13/22  1516 03/21/22  1224 03/16/22  0920 08/30/21  1559   HGB 13.6 13.6  --    < >  --     238  --    < >  --    NA  --   --  141  --  137   POTASSIUM  --   --  4.1  --  4.0   CR  --   --  0.74  --  0.61    < > = values in this interval not displayed.        Diagnostics:  Labs pending at this time.  Results will be reviewed when available.   EKG required for preop and not completed in the last 90 days.     Revised Cardiac Risk Index (RCRI):  The patient has the following serious cardiovascular risks for perioperative complications:   - No serious cardiac risks = 0 points     RCRI Interpretation: 0 points: Class I (very low risk - 0.4% complication rate)           Signed Electronically by: Megan Phillips MD  Copy of this evaluation report is provided to requesting physician.  Total time spent was 40 minutes review of records for preop exam addressing multiple health conditions    Answers for HPI/ROS submitted by the patient on 4/24/2023  If you checked off any problems, how difficult have these problems made it for you to do your work, take care of things at home, or get along with other people?: Somewhat difficult  PHQ9 TOTAL SCORE: 8

## 2023-04-24 NOTE — H&P (VIEW-ONLY)
[address], [address]  [facility] [place] [address]  Phone: [phone]  Primary Provider: Iliana Rider  Pre-op Performing Provider: ILIANA RIDER      PREOPERATIVE EVALUATION:  Today's date: 4/24/2023    Tiffani Brasher is a 52 year old female who presents for a preoperative evaluation.       View : No data to display.              Surgical Information:  Surgery/Procedure: Laparoscopic sleeve gastrectomy  Surgery Location: [facility]    Surgeon: Al Martinez MD  Surgery Date: 4/26/2023  Time of Surgery: 7:30 AM  Where patient plans to recover: At home with family  Fax number for surgical facility: Note does not need to be faxed, will be available electronically in Epic.    Assessment & Plan     The proposed surgical procedure is considered INTERMEDIATE risk.    Problem List Items Addressed This Visit        Respiratory    MAIN (obstructive sleep apnea)       Digestive    Morbid obesity (H)       Circulatory    Essential hypertension       Behavioral    JAXON (generalized anxiety disorder)   Other Visit Diagnoses     Pre-operative general physical examination    -  Primary    eval for sleeve gastrectomy    Relevant Orders    EKG 12-lead complete w/read - Clinics (Completed)    Comprehensive metabolic panel (BMP + Alb, Alk Phos, ALT, AST, Total. Bili, TP) (Completed)    CBC with platelets (Completed)    Screen for colon cancer        Cervical cancer screening        Hyperlipidemia LDL goal <100        Relevant Orders    Lipid panel reflex to direct LDL Fasting (Completed)                 Risks and Recommendations:  The patient has the following additional risks and recommendations for perioperative complications:   - Consult Hospitalist / IM to assist with post-op medical management  Obstructive Sleep Apnea:   Use CPAP Post op    Antiplatelet or Anticoagulation Medication Instructions:   - aspirin: Discontinue aspirin 7-10 days prior to  procedure to reduce bleeding risk. It should be resumed postoperatively.     Additional Medication Instructions:   - Beta Blockers: Continue taking on the day of surgery.   - Statins: Continue taking on the day of surgery.    - SSRIs, SNRIs, TCAs, Antipsychotics: Continue without modification.     RECOMMENDATION:  APPROVAL GIVEN to proceed with proposed procedure, without further diagnostic evaluation.          Subjective     HPI related to upcoming procedure:   Sleeve gastrectomy.  Patient denies any dyspnea shortness of breath chest pain palpitation presyncope or syncope.  Denies any blood clots.  Denies any bleeding disorder.  Denies any complication from anesthesia.  Her METS level at least 4-7.  Had cardiac stress test that was negative.      4/24/2023    11:21 AM   Preop Questions   1. Have you ever had a heart attack or stroke? No   2. Have you ever had surgery on your heart or blood vessels, such as a stent placement, a coronary artery bypass, or surgery on an artery in your head, neck, heart, or legs? No   3. Do you have chest pain with activity? No   4. Do you have a history of  heart failure? No   5. Do you currently have a cold, bronchitis or symptoms of other infection? No   6. Do you have a cough, shortness of breath, or wheezing? No   7. Do you or anyone in your family have previous history of blood clots? No   8. Do you or does anyone in your family have a serious bleeding problem such as prolonged bleeding following surgeries or cuts? No   9. Have you ever had problems with anemia or been told to take iron pills? YES -    10. Have you had any abnormal blood loss such as black, tarry or bloody stools, or abnormal vaginal bleeding? YES -    11. Have you ever had a blood transfusion? YES -    11a. Have you ever had a transfusion reaction? No   12. Are you willing to have a blood transfusion if it is medically needed before, during, or after your surgery? Yes   13. Have you or any of your relatives ever  had problems with anesthesia? No   14. Do you have sleep apnea, excessive snoring or daytime drowsiness? YES - uses CPAP   14a. Do you have a CPAP machine? Yes   15. Do you have any artifical heart valves or other implanted medical devices like a pacemaker, defibrillator, or continuous glucose monitor? No   16. Do you have artificial joints? No   17. Are you allergic to latex? No   18. Is there any chance that you may be pregnant? No     Health Care Directive:  Patient does not have a Health Care Directive or Living Will: Discussed advance care planning with patient; information given to patient to review.    Preoperative Review of :   reviewed - no record of controlled substances prescribed.      Status of Chronic Conditions:  DEPRESSION - Patient has a long history of Depression of moderate severity requiring medication for control with recent symptoms being stable..Current symptoms of depression include depressed mood, insomnia, anxiety.     HYPERLIPIDEMIA - Patient has a long history of significant Hyperlipidemia requiring medication for treatment with recent fair control. Patient reports SOME problems or side effects with the medication.     HYPERTENSION - Patient has longstanding history of HTN , currently denies any symptoms referable to elevated blood pressure. Specifically denies chest pain, palpitations, dyspnea, orthopnea, PND or peripheral edema. Blood pressure readings have been in normal range. Current medication regimen is as listed below. Patient denies any side effects of medication.     SLEEP PROBLEM - Patient has a longstanding history of snoring, excessive daytime somnolence, fatigue, lack of concentration, morning headache and depression and mood changes.. Patient has tried OTC medications with limited success.       Review of Systems  Constitutional, neuro, ENT, endocrine, pulmonary, cardiac, gastrointestinal, genitourinary, musculoskeletal, integument and psychiatric systems are  negative, except as otherwise noted.    Patient Active Problem List    Diagnosis Date Noted     MAIN (obstructive sleep apnea) 04/13/2021     Priority: Medium     Intertrigo 12/02/2020     Priority: Medium     Insomnia, unspecified type 11/20/2020     Priority: Medium     Essential hypertension 11/20/2020     Priority: Medium     Morbid obesity (H) 04/11/2019     Priority: Medium     Anemia, iron deficiency 04/09/2019     Priority: Medium     PTSD (post-traumatic stress disorder) 03/01/2019     Priority: Medium     JAXON (generalized anxiety disorder) 10/08/2018     Priority: Medium     Severe episode of recurrent major depressive disorder, without psychotic features (H) 10/08/2018     Priority: Medium      Past Medical History:   Diagnosis Date     Anemia      Anemia, iron deficiency 4/9/2019     Essential hypertension 11/20/2020     JAXON (generalized anxiety disorder) 10/8/2018     Insomnia, unspecified type 11/20/2020     MAIN (obstructive sleep apnea) 4/13/2021     PTSD (post-traumatic stress disorder) 3/1/2019     Severe episode of recurrent major depressive disorder, without psychotic features (H) 10/8/2018     Past Surgical History:   Procedure Laterality Date     GYN SURGERY  2007    myomectomy     LAPAROSCOPIC HYSTERECTOMY TOTAL N/A 4/23/2019    Procedure: single site total laparoscopic hysterectomy, lysis of adhesion, drainage of ovarian cyst;  Surgeon: Vicki Hamilton MD;  Location: [facility] OR     ORTHOPEDIC SURGERY Right     knee      Current Outpatient Medications   Medication Sig Dispense Refill     ALPRAZolam (XANAX) 0.25 MG tablet Take 1 tablet (0.25 mg) by mouth daily as needed for anxiety 15 tablet 0     Calcium Carb-Cholecalciferol (CALCIUM 600/VITAMIN D) 600-400 MG-UNIT CHEW Take 1 tablet by mouth 2 times daily Take one twice daily and at least 2 hours apart from iron. 180 tablet 1     Multiple Vitamins-Iron (QC DAILY MULTIVITAMINS/IRON) TABS Take 1 tablet by mouth daily 90 tablet 1     propranolol ER  (INDERAL LA) 80 MG 24 hr capsule Take 1 capsule (80 mg) by mouth daily 90 capsule 0     rosuvastatin (CRESTOR) 20 MG tablet Take 1 tablet (20 mg) by mouth daily 90 tablet 3     sertraline (ZOLOFT) 100 MG tablet Take 1.5 tablets (150 mg) by mouth daily Office visit required for further refills 135 tablet 0     UNABLE TO FIND MEDICATION NAME: Liquid iron - dose unknown       vitamin D2 (ERGOCALCIFEROL) 48116 units (1250 mcg) capsule Take 1 capsule (50,000 Units) by mouth three times a week 12 capsule 0     aspirin (ASA) 81 MG EC tablet Take 1 tablet (81 mg) by mouth daily (Patient not taking: Reported on 4/24/2023) 90 tablet 1     mirtazapine (REMERON) 15 MG tablet Take 1 tablet (15 mg) by mouth At Bedtime (Patient not taking: Reported on 2/15/2023) 30 tablet 2       Allergies   Allergen Reactions     No Known Allergies         Social History     Tobacco Use     Smoking status: Never     Smokeless tobacco: Never   Vaping Use     Vaping status: Never Used   Substance Use Topics     Alcohol use: Yes     Comment: 9-10 glasses per month     Family History   Problem Relation Age of Onset     Hypertension Mother      Multiple myeloma Mother      Sleep Apnea Sister      Obesity Sister      History   Drug Use Unknown     Comment: before left [place]  April 2018  [place]ized [place]          Objective     /84 (BP Location: Right arm, Patient Position: Sitting, Cuff Size: Adult Large)   Pulse 67   Temp 96.8  F (36  C) (Temporal)   Resp 18   Wt 139.5 kg (307 lb 9.6 oz)   LMP 04/04/2019   SpO2 98%   BMI 45.42 kg/m      Physical Exam    GENERAL APPEARANCE: healthy, alert and no distress, obese, very pleasant     EYES: EOMI, PERRL     HENT: ear canals and TM's normal and nose and mouth without ulcers or lesions     NECK: no adenopathy, no asymmetry, masses, or scars and thyroid normal to palpation     RESP: lungs clear to auscultation - no rales, rhonchi or wheezes     CV: regular rates and rhythm, normal S1 S2, no S3 or S4 and  no murmur, click or rub     ABDOMEN:  soft, nontender, no HSM or masses and bowel sounds normal     MS: extremities normal- no gross deformities noted, no evidence of inflammation in joints, FROM in all extremities.     SKIN: no suspicious lesions or rashes     NEURO: Normal strength and tone, sensory exam grossly normal, mentation intact and speech normal     PSYCH: mentation appears normal. and affect normal/bright     LYMPHATICS: No cervical adenopathy    Recent Labs   Lab Test 02/16/23  1658 10/13/22  1516 03/21/22  1224 03/16/22  0920 08/30/21  1559   HGB 13.6 13.6  --    < >  --     238  --    < >  --    NA  --   --  141  --  137   POTASSIUM  --   --  4.1  --  4.0   CR  --   --  0.74  --  0.61    < > = values in this interval not displayed.        Diagnostics:  Labs pending at this time.  Results will be reviewed when available.   EKG required for preop and not completed in the last 90 days.     Revised Cardiac Risk Index (RCRI):  The patient has the following serious cardiovascular risks for perioperative complications:   - No serious cardiac risks = 0 points     RCRI Interpretation: 0 points: Class I (very low risk - 0.4% complication rate)           Signed Electronically by: Megan Phillips MD  Copy of this evaluation report is provided to requesting physician.  Total time spent was 40 minutes review of records for preop exam addressing multiple health conditions    Answers for HPI/ROS submitted by the patient on 4/24/2023  If you checked off any problems, how difficult have these problems made it for you to do your work, take care of things at home, or get along with other people?: Somewhat difficult  PHQ9 TOTAL SCORE: 8

## 2023-04-25 PROBLEM — F33.2 SEVERE EPISODE OF RECURRENT MAJOR DEPRESSIVE DISORDER, WITHOUT PSYCHOTIC FEATURES (H): Status: RESOLVED | Noted: 2018-10-08 | Resolved: 2023-04-25

## 2023-04-25 NOTE — PROGRESS NOTES
PTA medications updated by Medication Scribe prior to surgery via phone call with patient (last doses completed by Nurse)     Medication history sources: Patient, Surescripts and H&P  In the past week, patient estimated taking medication this percent of the time: Greater than 90%      Significant changes made to the medication list:  Patient reports no longer taking the following meds (med scribe removed from PTA med list): aspirin, remeron, mulitivit w/iron ergocalciferol, liquid iron.      Additional medication history information:   Patient was advised to bring: clear eye drops     Medication reconciliation completed by provider prior to medication history? No    Time spent in this activity: 25 minutes    The information provided in this note is only as accurate as the sources available at the time of update(s)      Prior to Admission medications    Medication Sig Last Dose Taking? Auth Provider Long Term End Date   ALPRAZolam (XANAX) 0.25 MG tablet Take 1 tablet (0.25 mg) by mouth daily as needed for anxiety Unknown at prn Yes Kye Montalvo PA-C     Calcium Carb-Cholecalciferol (CALCIUM 600/VITAMIN D) 600-400 MG-UNIT CHEW Take 1 tablet by mouth 2 times daily Take one twice daily and at least 2 hours apart from iron. 4/19/2023 at am Yes Megan Phillips MD     Naphazoline HCl (CLEAR EYES OP) Place 1 drop into both eyes as needed (for dry eye) Unknown at prn Yes Reported, Patient     propranolol ER (INDERAL LA) 80 MG 24 hr capsule Take 1 capsule (80 mg) by mouth daily 4/25/2023 at pm Yes Megan Phillips MD Yes    rosuvastatin (CRESTOR) 20 MG tablet Take 1 tablet (20 mg) by mouth daily Unknown at unknown Yes Megan Phillips MD Yes    sertraline (ZOLOFT) 100 MG tablet Take 1.5 tablets (150 mg) by mouth daily Office visit required for further refills 4/25/2023 at pm Yes Megan Phillips MD Yes

## 2023-04-26 ENCOUNTER — ANESTHESIA EVENT (OUTPATIENT)
Dept: SURGERY | Facility: CLINIC | Age: 53
End: 2023-04-26
Payer: COMMERCIAL

## 2023-04-26 ENCOUNTER — APPOINTMENT (OUTPATIENT)
Dept: SURGERY | Facility: PHYSICIAN GROUP | Age: 53
End: 2023-04-26
Payer: COMMERCIAL

## 2023-04-26 ENCOUNTER — ANESTHESIA (OUTPATIENT)
Dept: SURGERY | Facility: CLINIC | Age: 53
End: 2023-04-26
Payer: COMMERCIAL

## 2023-04-26 ENCOUNTER — HOSPITAL ENCOUNTER (INPATIENT)
Facility: CLINIC | Age: 53
LOS: 5 days | Discharge: HOME | End: 2023-05-01
Attending: SURGERY | Admitting: SURGERY
Payer: COMMERCIAL

## 2023-04-26 DIAGNOSIS — Z98.890 PONV (POSTOPERATIVE NAUSEA AND VOMITING): Primary | ICD-10-CM

## 2023-04-26 DIAGNOSIS — I10 ESSENTIAL HYPERTENSION: ICD-10-CM

## 2023-04-26 DIAGNOSIS — R11.2 PONV (POSTOPERATIVE NAUSEA AND VOMITING): Primary | ICD-10-CM

## 2023-04-26 DIAGNOSIS — E66.01 MORBID OBESITY WITH BMI OF 40.0-44.9, ADULT (H): ICD-10-CM

## 2023-04-26 LAB
CREAT SERPL-MCNC: 0.8 MG/DL (ref 0.51–0.95)
GFR SERPL CREATININE-BSD FRML MDRD: 88 ML/MIN/1.73M2
GLUCOSE SERPL-MCNC: 88 MG/DL (ref 70–99)
POTASSIUM SERPL-SCNC: 3.8 MMOL/L (ref 3.4–5.3)

## 2023-04-26 PROCEDURE — 258N000003 HC RX IP 258 OP 636: Performed by: ANESTHESIOLOGY

## 2023-04-26 PROCEDURE — 250N000013 HC RX MED GY IP 250 OP 250 PS 637: Performed by: SURGERY

## 2023-04-26 PROCEDURE — 258N000003 HC RX IP 258 OP 636

## 2023-04-26 PROCEDURE — 250N000011 HC RX IP 250 OP 636: Performed by: PHYSICIAN ASSISTANT

## 2023-04-26 PROCEDURE — 250N000009 HC RX 250: Performed by: PHYSICIAN ASSISTANT

## 2023-04-26 PROCEDURE — 370N000017 HC ANESTHESIA TECHNICAL FEE, PER MIN: Performed by: SURGERY

## 2023-04-26 PROCEDURE — 94660 CPAP INITIATION&MGMT: CPT

## 2023-04-26 PROCEDURE — 88305 TISSUE EXAM BY PATHOLOGIST: CPT | Mod: TC | Performed by: SURGERY

## 2023-04-26 PROCEDURE — 272N000001 HC OR GENERAL SUPPLY STERILE: Performed by: SURGERY

## 2023-04-26 PROCEDURE — 250N000025 HC SEVOFLURANE, PER MIN: Performed by: SURGERY

## 2023-04-26 PROCEDURE — 82565 ASSAY OF CREATININE: CPT | Performed by: PHYSICIAN ASSISTANT

## 2023-04-26 PROCEDURE — 88305 TISSUE EXAM BY PATHOLOGIST: CPT | Mod: 26 | Performed by: PATHOLOGY

## 2023-04-26 PROCEDURE — 0DB64Z3 EXCISION OF STOMACH, PERCUTANEOUS ENDOSCOPIC APPROACH, VERTICAL: ICD-10-PCS | Performed by: SURGERY

## 2023-04-26 PROCEDURE — 258N000001 HC RX 258: Performed by: SURGERY

## 2023-04-26 PROCEDURE — 250N000013 HC RX MED GY IP 250 OP 250 PS 637: Performed by: PHYSICIAN ASSISTANT

## 2023-04-26 PROCEDURE — 250N000011 HC RX IP 250 OP 636: Performed by: SURGERY

## 2023-04-26 PROCEDURE — 250N000011 HC RX IP 250 OP 636

## 2023-04-26 PROCEDURE — 120N000001 HC R&B MED SURG/OB

## 2023-04-26 PROCEDURE — 710N000010 HC RECOVERY PHASE 1, LEVEL 2, PER MIN: Performed by: SURGERY

## 2023-04-26 PROCEDURE — 250N000013 HC RX MED GY IP 250 OP 250 PS 637

## 2023-04-26 PROCEDURE — 250N000011 HC RX IP 250 OP 636: Performed by: ANESTHESIOLOGY

## 2023-04-26 PROCEDURE — 999N000141 HC STATISTIC PRE-PROCEDURE NURSING ASSESSMENT: Performed by: SURGERY

## 2023-04-26 PROCEDURE — 999N000157 HC STATISTIC RCP TIME EA 10 MIN

## 2023-04-26 PROCEDURE — 250N000009 HC RX 250: Performed by: SURGERY

## 2023-04-26 PROCEDURE — 360N000077 HC SURGERY LEVEL 4, PER MIN: Performed by: SURGERY

## 2023-04-26 PROCEDURE — 250N000009 HC RX 250

## 2023-04-26 PROCEDURE — 36415 COLL VENOUS BLD VENIPUNCTURE: CPT | Performed by: ANESTHESIOLOGY

## 2023-04-26 PROCEDURE — 258N000003 HC RX IP 258 OP 636: Performed by: PHYSICIAN ASSISTANT

## 2023-04-26 PROCEDURE — 84132 ASSAY OF SERUM POTASSIUM: CPT | Performed by: ANESTHESIOLOGY

## 2023-04-26 PROCEDURE — 82947 ASSAY GLUCOSE BLOOD QUANT: CPT | Performed by: ANESTHESIOLOGY

## 2023-04-26 PROCEDURE — 43775 LAP SLEEVE GASTRECTOMY: CPT | Mod: AS | Performed by: PHYSICIAN ASSISTANT

## 2023-04-26 PROCEDURE — 36415 COLL VENOUS BLD VENIPUNCTURE: CPT | Performed by: PHYSICIAN ASSISTANT

## 2023-04-26 PROCEDURE — 43775 LAP SLEEVE GASTRECTOMY: CPT | Performed by: SURGERY

## 2023-04-26 RX ORDER — SIMETHICONE 40MG/0.6ML
40 SUSPENSION, DROPS(FINAL DOSAGE FORM)(ML) ORAL 4 TIMES DAILY
Status: DISCONTINUED | OUTPATIENT
Start: 2023-04-26 | End: 2023-04-27

## 2023-04-26 RX ORDER — ACETAMINOPHEN 325 MG/1
650 TABLET ORAL EVERY 4 HOURS PRN
Status: DISCONTINUED | OUTPATIENT
Start: 2023-04-26 | End: 2023-05-01 | Stop reason: HOSPADM

## 2023-04-26 RX ORDER — PROPOFOL 10 MG/ML
INJECTION, EMULSION INTRAVENOUS PRN
Status: DISCONTINUED | OUTPATIENT
Start: 2023-04-26 | End: 2023-04-26

## 2023-04-26 RX ORDER — NALOXONE HYDROCHLORIDE 0.4 MG/ML
0.2 INJECTION, SOLUTION INTRAMUSCULAR; INTRAVENOUS; SUBCUTANEOUS
Status: DISCONTINUED | OUTPATIENT
Start: 2023-04-26 | End: 2023-05-01 | Stop reason: HOSPADM

## 2023-04-26 RX ORDER — ENALAPRILAT 1.25 MG/ML
1.25 INJECTION INTRAVENOUS EVERY 6 HOURS PRN
Status: DISCONTINUED | OUTPATIENT
Start: 2023-04-26 | End: 2023-05-01 | Stop reason: HOSPADM

## 2023-04-26 RX ORDER — SODIUM CHLORIDE, SODIUM LACTATE, POTASSIUM CHLORIDE, CALCIUM CHLORIDE 600; 310; 30; 20 MG/100ML; MG/100ML; MG/100ML; MG/100ML
INJECTION, SOLUTION INTRAVENOUS CONTINUOUS
Status: DISCONTINUED | OUTPATIENT
Start: 2023-04-26 | End: 2023-04-26 | Stop reason: HOSPADM

## 2023-04-26 RX ORDER — LIDOCAINE 40 MG/G
CREAM TOPICAL
Status: DISCONTINUED | OUTPATIENT
Start: 2023-04-26 | End: 2023-05-01 | Stop reason: HOSPADM

## 2023-04-26 RX ORDER — HYDROMORPHONE HYDROCHLORIDE 1 MG/ML
.3-.5 INJECTION, SOLUTION INTRAMUSCULAR; INTRAVENOUS; SUBCUTANEOUS
Status: DISCONTINUED | OUTPATIENT
Start: 2023-04-26 | End: 2023-05-01 | Stop reason: HOSPADM

## 2023-04-26 RX ORDER — METHOCARBAMOL 500 MG/1
500 TABLET, FILM COATED ORAL 4 TIMES DAILY PRN
Status: DISCONTINUED | OUTPATIENT
Start: 2023-04-26 | End: 2023-04-28

## 2023-04-26 RX ORDER — DIAZEPAM 10 MG/2ML
2.5 INJECTION, SOLUTION INTRAMUSCULAR; INTRAVENOUS EVERY 6 HOURS PRN
Status: DISCONTINUED | OUTPATIENT
Start: 2023-04-26 | End: 2023-05-01 | Stop reason: HOSPADM

## 2023-04-26 RX ORDER — PROPRANOLOL HYDROCHLORIDE 20 MG/1
20 TABLET ORAL 2 TIMES DAILY
Status: DISCONTINUED | OUTPATIENT
Start: 2023-04-26 | End: 2023-05-01 | Stop reason: HOSPADM

## 2023-04-26 RX ORDER — FENTANYL CITRATE 0.05 MG/ML
50 INJECTION, SOLUTION INTRAMUSCULAR; INTRAVENOUS EVERY 5 MIN PRN
Status: DISCONTINUED | OUTPATIENT
Start: 2023-04-26 | End: 2023-04-26 | Stop reason: HOSPADM

## 2023-04-26 RX ORDER — ALBUTEROL SULFATE 90 UG/1
AEROSOL, METERED RESPIRATORY (INHALATION) PRN
Status: DISCONTINUED | OUTPATIENT
Start: 2023-04-26 | End: 2023-04-26

## 2023-04-26 RX ORDER — CARBOXYMETHYLCELLULOSE SODIUM 5 MG/ML
1 SOLUTION/ DROPS OPHTHALMIC
Status: DISCONTINUED | OUTPATIENT
Start: 2023-04-26 | End: 2023-05-01 | Stop reason: HOSPADM

## 2023-04-26 RX ORDER — FENTANYL CITRATE 50 UG/ML
INJECTION, SOLUTION INTRAMUSCULAR; INTRAVENOUS PRN
Status: DISCONTINUED | OUTPATIENT
Start: 2023-04-26 | End: 2023-04-26

## 2023-04-26 RX ORDER — ONDANSETRON 4 MG/1
4 TABLET, ORALLY DISINTEGRATING ORAL EVERY 30 MIN PRN
Status: DISCONTINUED | OUTPATIENT
Start: 2023-04-26 | End: 2023-04-26 | Stop reason: HOSPADM

## 2023-04-26 RX ORDER — FENTANYL CITRATE 0.05 MG/ML
25 INJECTION, SOLUTION INTRAMUSCULAR; INTRAVENOUS EVERY 5 MIN PRN
Status: DISCONTINUED | OUTPATIENT
Start: 2023-04-26 | End: 2023-04-26 | Stop reason: HOSPADM

## 2023-04-26 RX ORDER — NALOXONE HYDROCHLORIDE 0.4 MG/ML
0.4 INJECTION, SOLUTION INTRAMUSCULAR; INTRAVENOUS; SUBCUTANEOUS
Status: DISCONTINUED | OUTPATIENT
Start: 2023-04-26 | End: 2023-05-01 | Stop reason: HOSPADM

## 2023-04-26 RX ORDER — KETOROLAC TROMETHAMINE 15 MG/ML
15 INJECTION, SOLUTION INTRAMUSCULAR; INTRAVENOUS EVERY 6 HOURS PRN
Status: COMPLETED | OUTPATIENT
Start: 2023-04-26 | End: 2023-04-29

## 2023-04-26 RX ORDER — DIPHENHYDRAMINE HCL 25 MG
25 CAPSULE ORAL EVERY 6 HOURS PRN
Status: DISCONTINUED | OUTPATIENT
Start: 2023-04-26 | End: 2023-05-01 | Stop reason: HOSPADM

## 2023-04-26 RX ORDER — OXYCODONE HYDROCHLORIDE 5 MG/1
10 TABLET ORAL
Status: DISCONTINUED | OUTPATIENT
Start: 2023-04-26 | End: 2023-05-01 | Stop reason: HOSPADM

## 2023-04-26 RX ORDER — CEFAZOLIN SODIUM/WATER 3 G/30 ML
3 SYRINGE (ML) INTRAVENOUS
Status: COMPLETED | OUTPATIENT
Start: 2023-04-26 | End: 2023-04-26

## 2023-04-26 RX ORDER — ALPRAZOLAM 0.25 MG
0.25 TABLET ORAL DAILY PRN
Status: DISCONTINUED | OUTPATIENT
Start: 2023-04-26 | End: 2023-05-01 | Stop reason: HOSPADM

## 2023-04-26 RX ORDER — ACETAMINOPHEN 325 MG/1
975 TABLET ORAL ONCE
Status: COMPLETED | OUTPATIENT
Start: 2023-04-26 | End: 2023-04-26

## 2023-04-26 RX ORDER — ENOXAPARIN SODIUM 100 MG/ML
40 INJECTION SUBCUTANEOUS EVERY 12 HOURS
Status: DISCONTINUED | OUTPATIENT
Start: 2023-04-27 | End: 2023-05-01 | Stop reason: HOSPADM

## 2023-04-26 RX ORDER — DIPHENHYDRAMINE HYDROCHLORIDE 50 MG/ML
25 INJECTION INTRAMUSCULAR; INTRAVENOUS EVERY 6 HOURS PRN
Status: DISCONTINUED | OUTPATIENT
Start: 2023-04-26 | End: 2023-05-01 | Stop reason: HOSPADM

## 2023-04-26 RX ORDER — SODIUM CHLORIDE, SODIUM LACTATE, POTASSIUM CHLORIDE, CALCIUM CHLORIDE 600; 310; 30; 20 MG/100ML; MG/100ML; MG/100ML; MG/100ML
INJECTION, SOLUTION INTRAVENOUS CONTINUOUS
Status: DISCONTINUED | OUTPATIENT
Start: 2023-04-26 | End: 2023-05-01 | Stop reason: HOSPADM

## 2023-04-26 RX ORDER — PROCHLORPERAZINE MALEATE 10 MG
10 TABLET ORAL EVERY 6 HOURS PRN
Status: DISCONTINUED | OUTPATIENT
Start: 2023-04-26 | End: 2023-05-01 | Stop reason: HOSPADM

## 2023-04-26 RX ORDER — HYDROMORPHONE HCL IN WATER/PF 6 MG/30 ML
0.4 PATIENT CONTROLLED ANALGESIA SYRINGE INTRAVENOUS EVERY 5 MIN PRN
Status: DISCONTINUED | OUTPATIENT
Start: 2023-04-26 | End: 2023-04-26 | Stop reason: HOSPADM

## 2023-04-26 RX ORDER — HYDROMORPHONE HYDROCHLORIDE 1 MG/ML
INJECTION, SOLUTION INTRAMUSCULAR; INTRAVENOUS; SUBCUTANEOUS PRN
Status: DISCONTINUED | OUTPATIENT
Start: 2023-04-26 | End: 2023-04-26

## 2023-04-26 RX ORDER — BUPIVACAINE HYDROCHLORIDE 2.5 MG/ML
INJECTION, SOLUTION INFILTRATION; PERINEURAL PRN
Status: DISCONTINUED | OUTPATIENT
Start: 2023-04-26 | End: 2023-04-26 | Stop reason: HOSPADM

## 2023-04-26 RX ORDER — ONDANSETRON 2 MG/ML
INJECTION INTRAMUSCULAR; INTRAVENOUS PRN
Status: DISCONTINUED | OUTPATIENT
Start: 2023-04-26 | End: 2023-04-26

## 2023-04-26 RX ORDER — LIDOCAINE HYDROCHLORIDE 20 MG/ML
INJECTION, SOLUTION INFILTRATION; PERINEURAL PRN
Status: DISCONTINUED | OUTPATIENT
Start: 2023-04-26 | End: 2023-04-26

## 2023-04-26 RX ORDER — PROPOFOL 10 MG/ML
INJECTION, EMULSION INTRAVENOUS CONTINUOUS PRN
Status: DISCONTINUED | OUTPATIENT
Start: 2023-04-26 | End: 2023-04-26

## 2023-04-26 RX ORDER — HEPARIN SODIUM 5000 [USP'U]/.5ML
5000 INJECTION, SOLUTION INTRAVENOUS; SUBCUTANEOUS
Status: COMPLETED | OUTPATIENT
Start: 2023-04-26 | End: 2023-04-26

## 2023-04-26 RX ORDER — SODIUM CHLORIDE, SODIUM LACTATE, POTASSIUM CHLORIDE, CALCIUM CHLORIDE 600; 310; 30; 20 MG/100ML; MG/100ML; MG/100ML; MG/100ML
INJECTION, SOLUTION INTRAVENOUS CONTINUOUS PRN
Status: DISCONTINUED | OUTPATIENT
Start: 2023-04-26 | End: 2023-04-26

## 2023-04-26 RX ORDER — MAGNESIUM HYDROXIDE 1200 MG/15ML
LIQUID ORAL PRN
Status: DISCONTINUED | OUTPATIENT
Start: 2023-04-26 | End: 2023-04-26 | Stop reason: HOSPADM

## 2023-04-26 RX ORDER — ONDANSETRON 4 MG/1
4 TABLET, ORALLY DISINTEGRATING ORAL EVERY 6 HOURS PRN
Status: DISCONTINUED | OUTPATIENT
Start: 2023-04-26 | End: 2023-05-01 | Stop reason: HOSPADM

## 2023-04-26 RX ORDER — ONDANSETRON 2 MG/ML
4 INJECTION INTRAMUSCULAR; INTRAVENOUS EVERY 30 MIN PRN
Status: DISCONTINUED | OUTPATIENT
Start: 2023-04-26 | End: 2023-04-26 | Stop reason: HOSPADM

## 2023-04-26 RX ORDER — DEXAMETHASONE SODIUM PHOSPHATE 4 MG/ML
INJECTION, SOLUTION INTRA-ARTICULAR; INTRALESIONAL; INTRAMUSCULAR; INTRAVENOUS; SOFT TISSUE PRN
Status: DISCONTINUED | OUTPATIENT
Start: 2023-04-26 | End: 2023-04-26

## 2023-04-26 RX ORDER — CEFAZOLIN SODIUM/WATER 3 G/30 ML
3 SYRINGE (ML) INTRAVENOUS SEE ADMIN INSTRUCTIONS
Status: DISCONTINUED | OUTPATIENT
Start: 2023-04-26 | End: 2023-04-26 | Stop reason: HOSPADM

## 2023-04-26 RX ORDER — HYDROMORPHONE HCL IN WATER/PF 6 MG/30 ML
0.2 PATIENT CONTROLLED ANALGESIA SYRINGE INTRAVENOUS EVERY 5 MIN PRN
Status: DISCONTINUED | OUTPATIENT
Start: 2023-04-26 | End: 2023-04-26 | Stop reason: HOSPADM

## 2023-04-26 RX ORDER — ONDANSETRON 2 MG/ML
4 INJECTION INTRAMUSCULAR; INTRAVENOUS EVERY 6 HOURS PRN
Status: DISCONTINUED | OUTPATIENT
Start: 2023-04-26 | End: 2023-05-01 | Stop reason: HOSPADM

## 2023-04-26 RX ORDER — ONDANSETRON 2 MG/ML
4 INJECTION INTRAMUSCULAR; INTRAVENOUS
Status: DISCONTINUED | OUTPATIENT
Start: 2023-04-26 | End: 2023-04-26 | Stop reason: HOSPADM

## 2023-04-26 RX ORDER — OXYCODONE HYDROCHLORIDE 5 MG/1
5 TABLET ORAL
Status: DISCONTINUED | OUTPATIENT
Start: 2023-04-26 | End: 2023-05-01 | Stop reason: HOSPADM

## 2023-04-26 RX ADMIN — SIMETHICONE 40 MG: 20 EMULSION ORAL at 22:22

## 2023-04-26 RX ADMIN — ALBUTEROL SULFATE 6 PUFF: 90 AEROSOL, METERED RESPIRATORY (INHALATION) at 10:17

## 2023-04-26 RX ADMIN — MIDAZOLAM 2 MG: 1 INJECTION INTRAMUSCULAR; INTRAVENOUS at 07:39

## 2023-04-26 RX ADMIN — ONDANSETRON 4 MG: 2 INJECTION INTRAMUSCULAR; INTRAVENOUS at 08:03

## 2023-04-26 RX ADMIN — PROPRANOLOL HYDROCHLORIDE 20 MG: 20 TABLET ORAL at 20:49

## 2023-04-26 RX ADMIN — ALBUTEROL SULFATE 6 PUFF: 90 AEROSOL, METERED RESPIRATORY (INHALATION) at 07:55

## 2023-04-26 RX ADMIN — PHENYLEPHRINE HYDROCHLORIDE 100 MCG: 10 INJECTION INTRAVENOUS at 09:25

## 2023-04-26 RX ADMIN — ONDANSETRON 4 MG: 2 INJECTION INTRAMUSCULAR; INTRAVENOUS at 16:08

## 2023-04-26 RX ADMIN — PROPOFOL 200 MG: 10 INJECTION, EMULSION INTRAVENOUS at 07:50

## 2023-04-26 RX ADMIN — PHENYLEPHRINE HYDROCHLORIDE 100 MCG: 10 INJECTION INTRAVENOUS at 08:46

## 2023-04-26 RX ADMIN — ROCURONIUM BROMIDE 50 MG: 50 INJECTION, SOLUTION INTRAVENOUS at 07:50

## 2023-04-26 RX ADMIN — SODIUM CHLORIDE, POTASSIUM CHLORIDE, SODIUM LACTATE AND CALCIUM CHLORIDE: 600; 310; 30; 20 INJECTION, SOLUTION INTRAVENOUS at 06:56

## 2023-04-26 RX ADMIN — PROCHLORPERAZINE EDISYLATE 5 MG: 5 INJECTION INTRAMUSCULAR; INTRAVENOUS at 11:11

## 2023-04-26 RX ADMIN — HYOSCYAMINE SULFATE 125 MCG: 0.12 TABLET SUBLINGUAL at 18:31

## 2023-04-26 RX ADMIN — PHENYLEPHRINE HYDROCHLORIDE 150 MCG: 10 INJECTION INTRAVENOUS at 08:09

## 2023-04-26 RX ADMIN — SUGAMMADEX 400 MG: 100 INJECTION, SOLUTION INTRAVENOUS at 10:07

## 2023-04-26 RX ADMIN — DEXAMETHASONE SODIUM PHOSPHATE 4 MG: 4 INJECTION, SOLUTION INTRA-ARTICULAR; INTRALESIONAL; INTRAMUSCULAR; INTRAVENOUS; SOFT TISSUE at 08:03

## 2023-04-26 RX ADMIN — ACETAMINOPHEN 975 MG: 325 TABLET ORAL at 06:31

## 2023-04-26 RX ADMIN — HYDROMORPHONE HYDROCHLORIDE 0.5 MG: 1 INJECTION, SOLUTION INTRAMUSCULAR; INTRAVENOUS; SUBCUTANEOUS at 13:48

## 2023-04-26 RX ADMIN — ROCURONIUM BROMIDE 20 MG: 50 INJECTION, SOLUTION INTRAVENOUS at 08:52

## 2023-04-26 RX ADMIN — SODIUM CHLORIDE, POTASSIUM CHLORIDE, SODIUM LACTATE AND CALCIUM CHLORIDE: 600; 310; 30; 20 INJECTION, SOLUTION INTRAVENOUS at 07:39

## 2023-04-26 RX ADMIN — SODIUM CHLORIDE, POTASSIUM CHLORIDE, SODIUM LACTATE AND CALCIUM CHLORIDE: 600; 310; 30; 20 INJECTION, SOLUTION INTRAVENOUS at 08:46

## 2023-04-26 RX ADMIN — HYDROMORPHONE HYDROCHLORIDE 0.5 MCG: 1 INJECTION, SOLUTION INTRAMUSCULAR; INTRAVENOUS; SUBCUTANEOUS at 09:04

## 2023-04-26 RX ADMIN — ENALAPRILAT 1.25 MG: 1.25 INJECTION INTRAVENOUS at 16:28

## 2023-04-26 RX ADMIN — SODIUM CHLORIDE, POTASSIUM CHLORIDE, SODIUM LACTATE AND CALCIUM CHLORIDE: 600; 310; 30; 20 INJECTION, SOLUTION INTRAVENOUS at 12:26

## 2023-04-26 RX ADMIN — HEPARIN SODIUM 5000 UNITS: 5000 INJECTION, SOLUTION INTRAVENOUS; SUBCUTANEOUS at 06:32

## 2023-04-26 RX ADMIN — SODIUM CHLORIDE, POTASSIUM CHLORIDE, SODIUM LACTATE AND CALCIUM CHLORIDE: 600; 310; 30; 20 INJECTION, SOLUTION INTRAVENOUS at 21:07

## 2023-04-26 RX ADMIN — PHENYLEPHRINE HYDROCHLORIDE 100 MCG: 10 INJECTION INTRAVENOUS at 08:05

## 2023-04-26 RX ADMIN — PROCHLORPERAZINE EDISYLATE 10 MG: 5 INJECTION INTRAMUSCULAR; INTRAVENOUS at 20:13

## 2023-04-26 RX ADMIN — FAMOTIDINE 20 MG: 10 INJECTION INTRAVENOUS at 06:56

## 2023-04-26 RX ADMIN — HYDROMORPHONE HYDROCHLORIDE 0.5 MG: 1 INJECTION, SOLUTION INTRAMUSCULAR; INTRAVENOUS; SUBCUTANEOUS at 17:18

## 2023-04-26 RX ADMIN — KETOROLAC TROMETHAMINE 15 MG: 15 INJECTION, SOLUTION INTRAMUSCULAR; INTRAVENOUS at 20:18

## 2023-04-26 RX ADMIN — FAMOTIDINE 20 MG: 10 INJECTION INTRAVENOUS at 20:22

## 2023-04-26 RX ADMIN — FENTANYL CITRATE 100 MCG: 50 INJECTION, SOLUTION INTRAMUSCULAR; INTRAVENOUS at 07:50

## 2023-04-26 RX ADMIN — PHENYLEPHRINE HYDROCHLORIDE 150 MCG: 10 INJECTION INTRAVENOUS at 08:18

## 2023-04-26 RX ADMIN — SIMETHICONE 40 MG: 20 EMULSION ORAL at 18:30

## 2023-04-26 RX ADMIN — Medication 3 G: at 07:56

## 2023-04-26 RX ADMIN — PROPOFOL 30 MCG/KG/MIN: 10 INJECTION, EMULSION INTRAVENOUS at 08:03

## 2023-04-26 RX ADMIN — ALBUTEROL SULFATE 6 PUFF: 90 AEROSOL, METERED RESPIRATORY (INHALATION) at 10:05

## 2023-04-26 RX ADMIN — LIDOCAINE HYDROCHLORIDE 100 MG: 20 INJECTION, SOLUTION INFILTRATION; PERINEURAL at 07:50

## 2023-04-26 RX ADMIN — ONDANSETRON 4 MG: 2 INJECTION INTRAMUSCULAR; INTRAVENOUS at 10:51

## 2023-04-26 RX ADMIN — ROCURONIUM BROMIDE 10 MG: 50 INJECTION, SOLUTION INTRAVENOUS at 09:32

## 2023-04-26 ASSESSMENT — ACTIVITIES OF DAILY LIVING (ADL)
ADLS_ACUITY_SCORE: 26
ADLS_ACUITY_SCORE: 18
ADLS_ACUITY_SCORE: 26
ADLS_ACUITY_SCORE: 18
ADLS_ACUITY_SCORE: 18
ADLS_ACUITY_SCORE: 28
ADLS_ACUITY_SCORE: 18

## 2023-04-26 NOTE — ANESTHESIA POSTPROCEDURE EVALUATION
Patient: Tiffani Brasher    Procedure: Procedure(s):  Laparoscopic sleeve gastrectomy       Anesthesia Type:  General    Note:  Disposition: Admission   Postop Pain Control: Uneventful            Sign Out: Well controlled pain   PONV: No   Neuro/Psych: Uneventful            Sign Out: Acceptable/Baseline neuro status   Airway/Respiratory: Uneventful            Sign Out: Acceptable/Baseline resp. status   CV/Hemodynamics: Uneventful            Sign Out: Acceptable CV status   Other NRE:    DID A NON-ROUTINE EVENT OCCUR? No           Last vitals:  Vitals Value Taken Time   /99 04/26/23 1130   Temp 36.7  C (98  F) 04/26/23 1130   Pulse 62 04/26/23 1142   Resp 14 04/26/23 1142   SpO2 93 % 04/26/23 1142   Vitals shown include unvalidated device data.    Electronically Signed By: Kemi Ruiz  April 26, 2023  2:07 PM

## 2023-04-26 NOTE — ANESTHESIA CARE TRANSFER NOTE
Patient: Tiffani Brasher    Procedure: Procedure(s):  Laparoscopic sleeve gastrectomy       Diagnosis: Morbid obesity with BMI of 45.0-49.9, adult (H) [E66.01, Z68.42]  Diagnosis Additional Information: No value filed.    Anesthesia Type:   General     Note:    Oropharynx: oropharynx clear of all foreign objects and spontaneously breathing  Level of Consciousness: drowsy  Oxygen Supplementation: face mask  Level of Supplemental Oxygen (L/min / FiO2): 6  Independent Airway: airway patency satisfactory and stable  Dentition: dentition unchanged  Vital Signs Stable: post-procedure vital signs reviewed and stable  Report to RN Given: handoff report given  Patient transferred to: PACU    Handoff Report: Identifed the Patient, Identified the Reponsible Provider, Reviewed the pertinent medical history, Discussed the surgical course, Reviewed Intra-OP anesthesia mangement and issues during anesthesia, Set expectations for post-procedure period and Allowed opportunity for questions and acknowledgement of understanding      Vitals:  Vitals Value Taken Time   /101 04/26/23 1031   Temp     Pulse 61 04/26/23 1034   Resp 18 04/26/23 1034   SpO2 100 % 04/26/23 1034   Vitals shown include unvalidated device data.    Electronically Signed By: HALLE Hu CRNA  April 26, 2023  10:35 AM

## 2023-04-26 NOTE — ANESTHESIA PROCEDURE NOTES
Airway         Procedure Start/Stop Times: 4/26/2023 7:53 AM  Staff -        Anesthesiologist:  Kemi Ruiz       CRNA: Jane Jenkins APRN CRNA       Other Anesthesia Staff: Nena Cartagena       Performed By: PETER  Consent for Airway        Urgency: elective  Indications and Patient Condition       Indications for airway management: rahel-procedural       Induction type:intravenous       Mask difficulty assessment: 1 - vent by mask    Final Airway Details       Final airway type: endotracheal airway       Successful airway: ETT - single  Endotracheal Airway Details        ETT size (mm): 7.0       Cuffed: yes       Successful intubation technique: video laryngoscopy       VL Blade Size: Glidescope 3       Grade View of Cords: 1       Adjucts: stylet       Position: Right       Measured from: lips       Secured at (cm): 23       Bite block used: None    Post intubation assessment        Placement verified by: capnometry, equal breath sounds and chest rise        Number of attempts at approach: 1       Number of other approaches attempted: 0       Secured with: pink tape       Ease of procedure: easy       Dentition: Intact and Unchanged    Medication(s) Administered   Medication Administration Time: 4/26/2023 7:53 AM

## 2023-04-26 NOTE — ANESTHESIA PREPROCEDURE EVALUATION
Anesthesia Pre-Procedure Evaluation    Patient: Tiffani Brasher   MRN: [id number] : 1970        Procedure : Procedure(s):  Laparoscopic sleeve gastrectomy          Past Medical History:   Diagnosis Date     Anemia      Anemia, iron deficiency 2019     Depressive disorder      Essential hypertension 2020     JAXON (generalized anxiety disorder) 10/08/2018     Insomnia, unspecified type 2020     Obese      MAIN (obstructive sleep apnea) 2021     PTSD (post-traumatic stress disorder) 2019     Severe episode of recurrent major depressive disorder, without psychotic features (H) 10/08/2018      Past Surgical History:   Procedure Laterality Date     GYN SURGERY      myomectomy     LAPAROSCOPIC HYSTERECTOMY TOTAL N/A 2019    Procedure: single site total laparoscopic hysterectomy, lysis of adhesion, drainage of ovarian cyst;  Surgeon: Vicki Hamilton MD;  Location: [facility] OR     ORTHOPEDIC SURGERY Right     knee       Allergies   Allergen Reactions     No Known Allergies       Social History     Tobacco Use     Smoking status: Never     Smokeless tobacco: Never   Vaping Use     Vaping status: Never Used   Substance Use Topics     Alcohol use: Yes     Comment: 9-10 glasses per month      Wt Readings from Last 1 Encounters:   23 137.9 kg (304 lb)        Anesthesia Evaluation            ROS/MED HX  ENT/Pulmonary:     (+) sleep apnea, uses CPAP, asthma     Neurologic:       Cardiovascular:     (+) Dyslipidemia hypertension-----    METS/Exercise Tolerance:     Hematologic:     (+) anemia,     Musculoskeletal:       GI/Hepatic:    (-) GERD and liver disease   Renal/Genitourinary:     (+) renal disease (episode of kidney failure with antibiotics ),     Endo:     (+) Obesity,     Psychiatric/Substance Use:     (+) psychiatric history anxiety and depression (ptsd)     Infectious Disease:       Malignancy:       Other:            Physical Exam    Airway        Mallampati: III   TM  distance: > 3 FB   Neck ROM: full     Respiratory Devices and Support         Dental       (+) Minor Abnormalities - some fillings, tiny chips      Cardiovascular   cardiovascular exam normal          Pulmonary   pulmonary exam normal                OUTSIDE LABS:  CBC:   Lab Results   Component Value Date    WBC 6.1 04/24/2023    WBC 7.1 02/16/2023    HGB 14.0 04/24/2023    HGB 13.6 02/16/2023    HCT 45.4 04/24/2023    HCT 40.7 02/16/2023     04/24/2023     02/16/2023     BMP:   Lab Results   Component Value Date     04/24/2023     03/21/2022    POTASSIUM 4.3 04/24/2023    POTASSIUM 4.1 03/21/2022    CHLORIDE 107 04/24/2023    CHLORIDE 110 (H) 03/21/2022    CO2 26 04/24/2023    CO2 27 03/21/2022    BUN 11.5 04/24/2023    BUN 13 03/21/2022    CR 0.80 04/24/2023    CR 0.74 03/21/2022    GLC 94 04/24/2023     (H) 03/21/2022     COAGS: No results found for: PTT, INR, FIBR  POC:   Lab Results   Component Value Date    HCG Negative 04/23/2019     HEPATIC:   Lab Results   Component Value Date    ALBUMIN 4.2 04/24/2023    PROTTOTAL 7.5 04/24/2023    ALT 14 04/24/2023    AST 24 04/24/2023    ALKPHOS 78 04/24/2023    BILITOTAL 0.3 04/24/2023     OTHER:   Lab Results   Component Value Date    A1C 5.4 03/30/2021    IDALIA 9.7 04/24/2023    TSH 1.09 03/21/2022       Anesthesia Plan    ASA Status:  3      Anesthesia Type: General.     - Airway: ETT         Techniques and Equipment:     - Airway: Video-Laryngoscope         Consents    Anesthesia Plan(s) and associated risks, benefits, and realistic alternatives discussed. Questions answered and patient/representative(s) expressed understanding.    - Discussed:     - Discussed with:  Patient         Postoperative Care       PONV prophylaxis: Ondansetron (or other 5HT-3), Dexamethasone or Solumedrol, Background Propofol Infusion     Comments:                Kemi Ruiz

## 2023-04-26 NOTE — OP NOTE
Surgeon: Al Martinez MD.  1st Assistant: Tika Bland PA-C, The physicians assistant was medically necessary for their expertise in camera management, suctioning, suturing, and retraction.    PREOPERATIVE DIAGNOSES:   1. Morbid obesity.   Indication for operation:  Body mass index is 44.89 kg/m .  Tiffani Brasher has been previously unsuccessful with medical treatment for obesity and some of her comorbidities are directly related to obesity.    Past Medical History:   Diagnosis Date     Anemia      Anemia, iron deficiency 04/09/2019     Depressive disorder      Essential hypertension 11/20/2020     JAXON (generalized anxiety disorder) 10/08/2018     Insomnia, unspecified type 11/20/2020     Obese      MAIN (obstructive sleep apnea) 04/13/2021     PTSD (post-traumatic stress disorder) 03/01/2019     Severe episode of recurrent major depressive disorder, without psychotic features (H) 10/08/2018       POSTOPERATIVE DIAGNOSES:   1. Morbid obesity.   OPERATIVE PROCEDURE: Laparoscopic sleeve gastrectomy for morbid obesity.   ANESTHESIA: General.   ESTIMATED BLOOD LOSS: Less than 5 mL.   OPERATIVE PROCEDURE: After induction of general endotracheal anesthesia, Tiffani Brasher abdomen was prepped and draped in the usual sterile fashion. With a direct visualization trocar in the left upper quadrant, the abdomen was entered and pneumoperitoneum was established. Initial survey of the abdomen showed a normal appearance to the liver and omentum. A 12 mm trocar was placed in the right mid abdomen, and in the left mid abdomen, and a 5 mm trocar was placed in the left flank. A Carl retractor was placed through a subxiphoid incision and used to elevate the left lobe of the liver anterior and to the right.  We then took down the angle of His with electrocautery and blunt dissection. This was followed by evaluation of the pylorus and using cautery to gennaro the stomach 6 cm proximal to the pylorus. We then took down the short  gastric vessels with LigaSure device all the way from our gennaro 6 cm proximal to the pylorus up to the GE junction. Once the stomach was mobilized, we ensured no posterior adhesions to the pancreas were inhibiting adequate positioning of the stomach. At this point, a 36-Dominican orogastric tube was advanced into the patient's stomach and down into the duodenum and utilized to decompress any remaining stomach content and to gauge the size of our sleeve gastrectomy. We start firing reinforced staplers which had incorporated buttressing material from the 6 cm gennaro, proximal to the pylorus all the way up to the GE junction. At first we utilized green loads, later on blue loads. We ensured no twisting of the sleeve, good size of the sleeve and hemostatic and secure apposition of staples. Once the resection was completed, the amputated stomach was removed from the patient's abdomen and sent to pathology. We then occluded the stomach with the blunt grasper just prior to the pylorus and serially dilated and evacuated the sleeve with saline, air and methylene blue containing solution showing no evidence of hemorrhage, nor air leakage, nor fluid leakage. Orogastric tube was removed and we then further secured the staple line from top to bottom utilizing Vistaseal material while at the same time adhering the adjacent omentum to the staple line. Gallbladder evaluated and found to be normal.  The abdomen was surveyed 1 more time no evidence of injuries noted. The trocars were removed under direct visualization without evidence of bleeding and the one in the right abdomen was closed with Saroj-Miguelangel device using an 0 Vicryl suture. The remaining trocars were removed under direct visualization without evidence of bleeding. Pneumoperitoneum was released and skin was approximated in all port sites with 4-0 Monocryl. Steri-Strips and sterile dressing applied. No immediate complications. Sponge and needle count reported correct.

## 2023-04-26 NOTE — INTERVAL H&P NOTE
"I have reviewed the surgical (or preoperative) H&P that is linked to this encounter, and examined the patient. There are no significant changes    Clinical Conditions Present on Arrival:  Clinically Significant Risk Factors Present on Admission                  # Severe Obesity: Estimated body mass index is 44.89 kg/m  as calculated from the following:    Height as of this encounter: 1.753 m (5' 9\").    Weight as of this encounter: 137.9 kg (304 lb).       "

## 2023-04-26 NOTE — PLAN OF CARE
Goal Outcome Evaluation:             Pt is POD 0 Laparoscopic sleeve gastrectomy.Pt is alert and oriented.Nauseated all the time . PRN medication given with some relief.Took 30 ml water. Voided in the bed pan and incontinent at times.BP was high PRN medication given.IVF running 150 ml/hr. CPAP at night. Will monitor

## 2023-04-27 LAB
ANION GAP SERPL CALCULATED.3IONS-SCNC: 11 MMOL/L (ref 7–15)
CHLORIDE SERPL-SCNC: 99 MMOL/L (ref 98–107)
DEPRECATED HCO3 PLAS-SCNC: 27 MMOL/L (ref 22–29)
GLUCOSE BLDC GLUCOMTR-MCNC: 93 MG/DL (ref 70–99)
HGB BLD-MCNC: 12.7 G/DL (ref 11.7–15.7)
PATH REPORT.COMMENTS IMP SPEC: NORMAL
PATH REPORT.COMMENTS IMP SPEC: NORMAL
PATH REPORT.FINAL DX SPEC: NORMAL
PATH REPORT.GROSS SPEC: NORMAL
PATH REPORT.MICROSCOPIC SPEC OTHER STN: NORMAL
PATH REPORT.RELEVANT HX SPEC: NORMAL
PHOTO IMAGE: NORMAL
POTASSIUM SERPL-SCNC: 3.5 MMOL/L (ref 3.4–5.3)
SODIUM SERPL-SCNC: 137 MMOL/L (ref 136–145)

## 2023-04-27 PROCEDURE — 82374 ASSAY BLOOD CARBON DIOXIDE: CPT | Performed by: PHYSICIAN ASSISTANT

## 2023-04-27 PROCEDURE — 250N000009 HC RX 250: Performed by: PHYSICIAN ASSISTANT

## 2023-04-27 PROCEDURE — 250N000013 HC RX MED GY IP 250 OP 250 PS 637: Performed by: PHYSICIAN ASSISTANT

## 2023-04-27 PROCEDURE — 258N000003 HC RX IP 258 OP 636: Performed by: PHYSICIAN ASSISTANT

## 2023-04-27 PROCEDURE — 85018 HEMOGLOBIN: CPT | Performed by: PHYSICIAN ASSISTANT

## 2023-04-27 PROCEDURE — 120N000001 HC R&B MED SURG/OB

## 2023-04-27 PROCEDURE — 36415 COLL VENOUS BLD VENIPUNCTURE: CPT | Performed by: PHYSICIAN ASSISTANT

## 2023-04-27 PROCEDURE — 250N000011 HC RX IP 250 OP 636: Performed by: PHYSICIAN ASSISTANT

## 2023-04-27 PROCEDURE — 250N000011 HC RX IP 250 OP 636: Performed by: SURGERY

## 2023-04-27 RX ORDER — SIMETHICONE 40MG/0.6ML
40 SUSPENSION, DROPS(FINAL DOSAGE FORM)(ML) ORAL 4 TIMES DAILY PRN
Status: DISCONTINUED | OUTPATIENT
Start: 2023-04-27 | End: 2023-05-01 | Stop reason: HOSPADM

## 2023-04-27 RX ORDER — PROPRANOLOL HYDROCHLORIDE 20 MG/1
20 TABLET ORAL 2 TIMES DAILY
Qty: 60 TABLET | Refills: 0 | Status: SHIPPED | OUTPATIENT
Start: 2023-04-27 | End: 2023-12-18

## 2023-04-27 RX ORDER — DEXAMETHASONE SODIUM PHOSPHATE 4 MG/ML
6 INJECTION, SOLUTION INTRA-ARTICULAR; INTRALESIONAL; INTRAMUSCULAR; INTRAVENOUS; SOFT TISSUE EVERY 6 HOURS
Status: COMPLETED | OUTPATIENT
Start: 2023-04-27 | End: 2023-04-28

## 2023-04-27 RX ORDER — SCOLOPAMINE TRANSDERMAL SYSTEM 1 MG/1
1 PATCH, EXTENDED RELEASE TRANSDERMAL
Status: DISCONTINUED | OUTPATIENT
Start: 2023-04-27 | End: 2023-05-01 | Stop reason: HOSPADM

## 2023-04-27 RX ORDER — METHOCARBAMOL 500 MG/1
500 TABLET, FILM COATED ORAL 4 TIMES DAILY PRN
Qty: 12 TABLET | Refills: 0 | Status: SHIPPED | OUTPATIENT
Start: 2023-04-27 | End: 2023-04-28

## 2023-04-27 RX ORDER — ACETAMINOPHEN 325 MG/1
650 TABLET ORAL EVERY 6 HOURS PRN
COMMUNITY
Start: 2023-04-27

## 2023-04-27 RX ORDER — SIMETHICONE 40MG/0.6ML
40 SUSPENSION, DROPS(FINAL DOSAGE FORM)(ML) ORAL 4 TIMES DAILY PRN
Qty: 45 ML | Refills: 0 | Status: SHIPPED | OUTPATIENT
Start: 2023-04-27 | End: 2023-05-31

## 2023-04-27 RX ORDER — ONDANSETRON 4 MG/1
4 TABLET, ORALLY DISINTEGRATING ORAL EVERY 6 HOURS PRN
Qty: 15 TABLET | Refills: 0 | Status: SHIPPED | OUTPATIENT
Start: 2023-04-27 | End: 2023-05-31

## 2023-04-27 RX ADMIN — SIMETHICONE 40 MG: 20 EMULSION ORAL at 12:56

## 2023-04-27 RX ADMIN — HYOSCYAMINE SULFATE 125 MCG: 0.12 TABLET SUBLINGUAL at 12:56

## 2023-04-27 RX ADMIN — ONDANSETRON 4 MG: 2 INJECTION INTRAMUSCULAR; INTRAVENOUS at 09:57

## 2023-04-27 RX ADMIN — HYDROMORPHONE HYDROCHLORIDE 0.3 MG: 1 INJECTION, SOLUTION INTRAMUSCULAR; INTRAVENOUS; SUBCUTANEOUS at 01:52

## 2023-04-27 RX ADMIN — METHOCARBAMOL 500 MG: 500 TABLET ORAL at 10:05

## 2023-04-27 RX ADMIN — ENOXAPARIN SODIUM 40 MG: 40 INJECTION SUBCUTANEOUS at 20:28

## 2023-04-27 RX ADMIN — HYOSCYAMINE SULFATE 125 MCG: 0.12 TABLET SUBLINGUAL at 01:07

## 2023-04-27 RX ADMIN — PROPRANOLOL HYDROCHLORIDE 20 MG: 20 TABLET ORAL at 10:05

## 2023-04-27 RX ADMIN — SODIUM CHLORIDE, POTASSIUM CHLORIDE, SODIUM LACTATE AND CALCIUM CHLORIDE: 600; 310; 30; 20 INJECTION, SOLUTION INTRAVENOUS at 10:03

## 2023-04-27 RX ADMIN — ONDANSETRON 4 MG: 2 INJECTION INTRAMUSCULAR; INTRAVENOUS at 20:47

## 2023-04-27 RX ADMIN — DEXAMETHASONE SODIUM PHOSPHATE 6 MG: 4 INJECTION, SOLUTION INTRAMUSCULAR; INTRAVENOUS at 12:56

## 2023-04-27 RX ADMIN — DEXAMETHASONE SODIUM PHOSPHATE 6 MG: 4 INJECTION, SOLUTION INTRAMUSCULAR; INTRAVENOUS at 19:59

## 2023-04-27 RX ADMIN — SCOPALAMINE 1 PATCH: 1 PATCH, EXTENDED RELEASE TRANSDERMAL at 10:54

## 2023-04-27 RX ADMIN — SERTRALINE HYDROCHLORIDE 150 MG: 50 TABLET ORAL at 10:05

## 2023-04-27 RX ADMIN — ONDANSETRON 4 MG: 2 INJECTION INTRAMUSCULAR; INTRAVENOUS at 00:57

## 2023-04-27 RX ADMIN — OXYCODONE HYDROCHLORIDE 5 MG: 5 TABLET ORAL at 20:47

## 2023-04-27 RX ADMIN — HYOSCYAMINE SULFATE 125 MCG: 0.12 TABLET SUBLINGUAL at 06:40

## 2023-04-27 RX ADMIN — METHOCARBAMOL 500 MG: 500 TABLET ORAL at 19:59

## 2023-04-27 RX ADMIN — KETOROLAC TROMETHAMINE 15 MG: 15 INJECTION, SOLUTION INTRAMUSCULAR; INTRAVENOUS at 04:15

## 2023-04-27 RX ADMIN — ENOXAPARIN SODIUM 40 MG: 40 INJECTION SUBCUTANEOUS at 09:59

## 2023-04-27 RX ADMIN — OMEPRAZOLE 20 MG: 20 CAPSULE, DELAYED RELEASE ORAL at 06:40

## 2023-04-27 RX ADMIN — KETOROLAC TROMETHAMINE 15 MG: 15 INJECTION, SOLUTION INTRAMUSCULAR; INTRAVENOUS at 13:33

## 2023-04-27 RX ADMIN — PROCHLORPERAZINE EDISYLATE 10 MG: 5 INJECTION INTRAMUSCULAR; INTRAVENOUS at 04:11

## 2023-04-27 RX ADMIN — SODIUM CHLORIDE, POTASSIUM CHLORIDE, SODIUM LACTATE AND CALCIUM CHLORIDE: 600; 310; 30; 20 INJECTION, SOLUTION INTRAVENOUS at 03:45

## 2023-04-27 RX ADMIN — PROPRANOLOL HYDROCHLORIDE 20 MG: 20 TABLET ORAL at 20:28

## 2023-04-27 RX ADMIN — SIMETHICONE 40 MG: 20 EMULSION ORAL at 10:05

## 2023-04-27 ASSESSMENT — ACTIVITIES OF DAILY LIVING (ADL)
ADLS_ACUITY_SCORE: 26
ADLS_ACUITY_SCORE: 26
ADLS_ACUITY_SCORE: 22
ADLS_ACUITY_SCORE: 22
ADLS_ACUITY_SCORE: 23
ADLS_ACUITY_SCORE: 28
ADLS_ACUITY_SCORE: 20
ADLS_ACUITY_SCORE: 22
ADLS_ACUITY_SCORE: 20
ADLS_ACUITY_SCORE: 22
ADLS_ACUITY_SCORE: 20
ADLS_ACUITY_SCORE: 23

## 2023-04-27 NOTE — PROGRESS NOTES
"Bariatric Surgery Progress Note    Admission Date: 4/26/2023  Today's Date: 4/27/2023         Assessment:      Tiffani Brasher is a 52 year old female POD 1 s/p laparoscopic sleeve gastrectomy for morbid obesity with medical comorbid conditions including hypertension, obstructive sleep apnea.         Plan:   - Work on management of nausea. Apply Scop patch. Compazine and Zofran available. Avoid narcotics and try IV valium or PO robaxin for pain control. IV toradol also available. Change simethicone and levsin to as-needed due to nausea with PO intake  - Bariatric clear liquids available, advance intake as able  - Continue maintenance IV fluids  - Lovenox, ambulate QID, PCDs while resting, encourage IS use  - Prilosec daily  - Monitor blood pressure - improving. On home propranolol (changed from 24hr dosing to BID)    Dispo: will reassess this afternoon, but likely will need additional night in hospital for improvement and adequate oral hydration        Interval History:   Tmax 99.2 overnight, vitals stable with exception of hypertension - has been improving. Did receive one dose of vasotec yesterday at 1630. Tiffani has been quite nauseous and has one small episode of emesis - nonbloody. Nausea has been persistent; she hasn't been able to take much in by mouth, but she is very motivated. Voiding fine, up and moving well. She is in good spirits today. Main concern was pain radiating from her lateral right port site with movement - discussed that this is expected given the stretching of her muscle and fascial closure.          Physical Exam:   /85 (BP Location: Right arm)   Pulse 72   Temp 98.8  F (37.1  C) (Oral)   Resp 18   Ht 1.753 m (5' 9\")   Wt 137.9 kg (304 lb)   LMP 04/04/2019   SpO2 94%   BMI 44.89 kg/m    I/O last 3 completed shifts:  In: 4528 [P.O.:270; I.V.:4258]  Out: 1410 [Urine:1400; Blood:10]  General: NAD, pleasant, alert and oriented x3  Respiratory: non-labored " breathing  Cardiovascular: regular heart rate and rhythm  Abdomen: soft, nondistended, appropriately tender around incisions. Wearing abdominal binder  Incisions: clean, dry, and intact. No erythema or drainage  Extremities: no lower extremity edema, no calf tenderness. Wearing PCDs    LABS:  Recent Labs   Lab Test 04/27/23  0753 04/24/23  1228 02/16/23  1658 10/13/22  1516   WBC  --  6.1 7.1 7.2   HGB 12.7 14.0 13.6 13.6   MCV  --  99 90 97   PLT  --  225 232 238      Recent Labs   Lab Test 04/27/23  0753 04/26/23  1236 04/26/23  0621 04/24/23  1228 03/21/22  1224 08/30/21  1559   POTASSIUM 3.5  --  3.8 4.3 4.1 4.0   CHLORIDE 99  --   --  107 110* 107   CO2 27  --   --  26 27 26   BUN  --   --   --  11.5 13 9   CR  --  0.80  --  0.80 0.74 0.61   ANIONGAP 11  --   --  10 4 4      Recent Labs   Lab Test 04/24/23  1228 03/21/22  1224 03/16/22  0920 08/30/21  1559 03/30/21  0855   ALBUMIN 4.2 3.3*  --   --  3.3*   BILITOTAL 0.3 0.2  --   --  0.4   ALT 14 25  --  31 35   AST 24 14 12  --  21   ALKPHOS 78 81  --   --  75   -------------------------------    Tika Bland PA-C  [facility]  [phone]

## 2023-04-27 NOTE — PROGRESS NOTES
General Surgery - PM Check    Spoke with bedside RN. Tiffani is still having fairly persistent nausea. Avoiding narcotics, Scop patch is in place. Decadron added. She has taken in approximately 120cc so far today, no further emesis.     Plan to remain in hospital tonight. Anticipate discharge sometime tomorrow if improvement in N/V and better PO intake.       Tika Bland PA-C  [facility]  [phone]

## 2023-04-27 NOTE — PROGRESS NOTES
General surgery clinic note    Discussed operative findings, she is doing well with the exception of small emesis yesterday and continued nausea and dizziness today.  She is awake alert, in good spirits.  Abdomen is soft  We will add IV steroids short-term for her PONV.

## 2023-04-27 NOTE — PLAN OF CARE
Goal Outcome Evaluation:      Plan of Care Reviewed With: patient    Overall Patient Progress: improvingOverall Patient Progress: improving     POD # 1 of a laparoscopic sleeve gastrectomy. A&Ox4. VSS on RA- except HTN. CPAP used for part of the night- set up by respiratory. Pain managed with PRN Toradol x2, IV Dilaudid x1, abdominal binder, & ice packs. On a bariatric clear liquid diet. Intermittently nauseous & 1 small yellow emesis - relieved with PRN Zofran & Compazine. Poor oral intake. Intake during shift: 210 mL of water. PIV infusing LR at 150 mL/hr. Up Ax1 with a gait belt & IV pole- ambulated in room x2. Encourage ambulation. Adequate voiding in the bathroom. Hypoactive BS- reports not passing gas & no BM. Continue to monitor.

## 2023-04-28 LAB — GLUCOSE BLDC GLUCOMTR-MCNC: 143 MG/DL (ref 70–99)

## 2023-04-28 PROCEDURE — 250N000011 HC RX IP 250 OP 636: Performed by: PHYSICIAN ASSISTANT

## 2023-04-28 PROCEDURE — 99231 SBSQ HOSP IP/OBS SF/LOW 25: CPT | Performed by: NURSE PRACTITIONER

## 2023-04-28 PROCEDURE — 250N000011 HC RX IP 250 OP 636: Performed by: SURGERY

## 2023-04-28 PROCEDURE — 258N000003 HC RX IP 258 OP 636: Performed by: PHYSICIAN ASSISTANT

## 2023-04-28 PROCEDURE — 250N000013 HC RX MED GY IP 250 OP 250 PS 637: Performed by: PHYSICIAN ASSISTANT

## 2023-04-28 PROCEDURE — 120N000001 HC R&B MED SURG/OB

## 2023-04-28 RX ORDER — METHOCARBAMOL 500 MG/1
500 TABLET, FILM COATED ORAL EVERY 6 HOURS
Qty: 20 TABLET | Refills: 0 | Status: SHIPPED | OUTPATIENT
Start: 2023-04-28 | End: 2023-05-03

## 2023-04-28 RX ORDER — METHOCARBAMOL 500 MG/1
500 TABLET, FILM COATED ORAL 4 TIMES DAILY
Status: DISCONTINUED | OUTPATIENT
Start: 2023-04-28 | End: 2023-04-29

## 2023-04-28 RX ADMIN — HYDROMORPHONE HYDROCHLORIDE 0.5 MG: 1 INJECTION, SOLUTION INTRAMUSCULAR; INTRAVENOUS; SUBCUTANEOUS at 14:23

## 2023-04-28 RX ADMIN — METHOCARBAMOL 500 MG: 500 TABLET ORAL at 21:35

## 2023-04-28 RX ADMIN — HYOSCYAMINE SULFATE 125 MCG: 0.12 TABLET SUBLINGUAL at 18:16

## 2023-04-28 RX ADMIN — METHOCARBAMOL 500 MG: 500 TABLET ORAL at 13:25

## 2023-04-28 RX ADMIN — DEXAMETHASONE SODIUM PHOSPHATE 6 MG: 4 INJECTION, SOLUTION INTRAMUSCULAR; INTRAVENOUS at 00:22

## 2023-04-28 RX ADMIN — SODIUM CHLORIDE, POTASSIUM CHLORIDE, SODIUM LACTATE AND CALCIUM CHLORIDE: 600; 310; 30; 20 INJECTION, SOLUTION INTRAVENOUS at 07:30

## 2023-04-28 RX ADMIN — ONDANSETRON 4 MG: 2 INJECTION INTRAMUSCULAR; INTRAVENOUS at 14:23

## 2023-04-28 RX ADMIN — OXYCODONE HYDROCHLORIDE 5 MG: 5 TABLET ORAL at 03:16

## 2023-04-28 RX ADMIN — PROPRANOLOL HYDROCHLORIDE 20 MG: 20 TABLET ORAL at 08:36

## 2023-04-28 RX ADMIN — SERTRALINE HYDROCHLORIDE 150 MG: 50 TABLET ORAL at 08:36

## 2023-04-28 RX ADMIN — SODIUM CHLORIDE, POTASSIUM CHLORIDE, SODIUM LACTATE AND CALCIUM CHLORIDE: 600; 310; 30; 20 INJECTION, SOLUTION INTRAVENOUS at 00:22

## 2023-04-28 RX ADMIN — PROPRANOLOL HYDROCHLORIDE 20 MG: 20 TABLET ORAL at 20:27

## 2023-04-28 RX ADMIN — ONDANSETRON 4 MG: 4 TABLET, ORALLY DISINTEGRATING ORAL at 03:16

## 2023-04-28 RX ADMIN — SODIUM CHLORIDE, POTASSIUM CHLORIDE, SODIUM LACTATE AND CALCIUM CHLORIDE: 600; 310; 30; 20 INJECTION, SOLUTION INTRAVENOUS at 20:28

## 2023-04-28 RX ADMIN — METHOCARBAMOL 500 MG: 500 TABLET ORAL at 18:16

## 2023-04-28 RX ADMIN — HYOSCYAMINE SULFATE 125 MCG: 0.12 TABLET SUBLINGUAL at 21:35

## 2023-04-28 RX ADMIN — ENOXAPARIN SODIUM 40 MG: 40 INJECTION SUBCUTANEOUS at 08:36

## 2023-04-28 RX ADMIN — METHOCARBAMOL 500 MG: 500 TABLET ORAL at 08:36

## 2023-04-28 RX ADMIN — HYOSCYAMINE SULFATE 125 MCG: 0.12 TABLET SUBLINGUAL at 08:36

## 2023-04-28 RX ADMIN — OXYCODONE HYDROCHLORIDE 10 MG: 5 TABLET ORAL at 20:26

## 2023-04-28 RX ADMIN — ENOXAPARIN SODIUM 40 MG: 40 INJECTION SUBCUTANEOUS at 20:26

## 2023-04-28 RX ADMIN — SODIUM CHLORIDE, POTASSIUM CHLORIDE, SODIUM LACTATE AND CALCIUM CHLORIDE: 600; 310; 30; 20 INJECTION, SOLUTION INTRAVENOUS at 14:24

## 2023-04-28 RX ADMIN — OXYCODONE HYDROCHLORIDE 10 MG: 5 TABLET ORAL at 07:28

## 2023-04-28 RX ADMIN — OMEPRAZOLE 20 MG: 20 CAPSULE, DELAYED RELEASE ORAL at 05:56

## 2023-04-28 RX ADMIN — HYOSCYAMINE SULFATE 125 MCG: 0.12 TABLET SUBLINGUAL at 13:25

## 2023-04-28 ASSESSMENT — ACTIVITIES OF DAILY LIVING (ADL)
ADLS_ACUITY_SCORE: 22

## 2023-04-28 NOTE — PLAN OF CARE
"Goal Outcome Evaluation:      Plan of Care Reviewed With: patient    Overall Patient Progress: no changeOverall Patient Progress: no change     [id number]  POD#2 of a laparoscopic sleeve gastrectomy. A&Ox4. VSS on RA- except HTN. CPAP declined after pt tried initially this shift. Pain managed with PRN oxy x1, abdominal binder, & ice packs. On a bariatric clear liquid diet. Intermittently nauseous & 1 small clear emesis - relieved with PRN Zofran x1. PIV infusing LR at 150 mL/hr. Up Ind to bathroom initially but now SBA. Voids adequately. BS hypoactive. Reports some passing of flatus. Pt reported having a sudden bout of pain in R abdomen while she stood to go to bathroom that made her \"slowly go to my knees. I crawled back to bed.\" Pt denies being injured, denies hitting head, vitally stable, bed alarm on and education provided. Provider notified. Will continue to monitor.     "

## 2023-04-28 NOTE — PLAN OF CARE
Goal Outcome Evaluation: POD #1 lap gastric sleeve. Pt A/O x4, VSS ex elevated BP, improves slightly with scheduled meds- no PRN available. Sats high 90's% RA. Cont pulse Ox at NOC-can use CPAP. Pain managed with PRN Robaxin, Oxycodone x1. Passing small amount of gas- no BM. Intake slow, but improving. Mild nausea managed with ant-emetics- No vomiting. ABD in place. Up ambulating SBA+ IV pole. Plan: encourage intake and ambulation. Monitor BP for PRN need      Plan of Care Reviewed With: patient

## 2023-04-28 NOTE — DISCHARGE SUMMARY
[facility] Discharge Summary    Tiffani Brasher MRN# [id number]   Age: 52 year old YOB: 1970     Date of Admission:  4/26/2023  Date of Discharge:  4/28/2023  Admitting Provider:  Al Martinez MD  Discharge Provider:  Lashawn Palma PA-C with Dr. Martinez  Discharging Service: Bariatric Surgery     Primary Provider: Megan Phillips  Primary Care Physician Phone Number: [phone]          Admission Diagnoses:   Principle Diagnosis: Morbid obesity with BMI of 45.0-49.9 with medical comorbid conditions    Secondary Diagnoses:  Hypertension  Postoperative nausea/vomiting  Depression  PTSD  Insomnia  Obstructive sleep apnea            Discharge Diagnosis:   Same as above          Procedures:   Procedure(s): Laparoscopic sleeve gastrectomy            Discharge Medications:     Current Discharge Medication List      START taking these medications    Details   acetaminophen (TYLENOL) 325 MG tablet Take 2 tablets (650 mg) by mouth every 6 hours as needed for pain    Associated Diagnoses: Morbid obesity with BMI of 40.0-44.9, adult (H)      hyoscyamine (LEVSIN/SL) 0.125 MG sublingual tablet Place 1 tablet (125 mcg) under the tongue every 6 hours for 5 days  Qty: 20 tablet, Refills: 0    Associated Diagnoses: Morbid obesity with BMI of 40.0-44.9, adult (H)      methocarbamol (ROBAXIN) 500 MG tablet Take 1 tablet (500 mg) by mouth every 6 hours for 5 days  Qty: 20 tablet, Refills: 0    Associated Diagnoses: Morbid obesity with BMI of 40.0-44.9, adult (H)      omeprazole (PRILOSEC) 20 MG DR capsule Take 1 capsule (20 mg) by mouth every morning (before breakfast)  Qty: 90 capsule, Refills: 0    Associated Diagnoses: Morbid obesity with BMI of 40.0-44.9, adult (H)      ondansetron (ZOFRAN ODT) 4 MG ODT tab Take 1 tablet (4 mg) by mouth every 6 hours as needed for nausea or vomiting  Qty: 15 tablet, Refills: 0    Associated Diagnoses: Morbid obesity with BMI of 40.0-44.9, adult (H)      propranolol  (INDERAL) 20 MG tablet Take 1 tablet (20 mg) by mouth 2 times daily for 30 days  Qty: 60 tablet, Refills: 0    Associated Diagnoses: Morbid obesity with BMI of 40.0-44.9, adult (H)      simethicone (MYLICON) 40 MG/0.6ML suspension Take 0.6 mLs (40 mg) by mouth 4 times daily as needed for cramping or flatulence (gas pain)  Qty: 45 mL, Refills: 0    Associated Diagnoses: Morbid obesity with BMI of 40.0-44.9, adult (H)         CONTINUE these medications which have NOT CHANGED    Details   ALPRAZolam (XANAX) 0.25 MG tablet Take 1 tablet (0.25 mg) by mouth daily as needed for anxiety  Qty: 15 tablet, Refills: 0    Associated Diagnoses: JAXON (generalized anxiety disorder); PTSD (post-traumatic stress disorder)      Calcium Carb-Cholecalciferol (CALCIUM 600/VITAMIN D) 600-400 MG-UNIT CHEW Take 1 tablet by mouth 2 times daily Take one twice daily and at least 2 hours apart from iron.  Qty: 180 tablet, Refills: 1    Associated Diagnoses: Morbid obesity with BMI of 45.0-49.9, adult (H)      Naphazoline HCl (CLEAR EYES OP) Place 1 drop into both eyes as needed (for dry eye)      rosuvastatin (CRESTOR) 20 MG tablet Take 1 tablet (20 mg) by mouth daily  Qty: 90 tablet, Refills: 3    Comments: To replace atorvastatin  Associated Diagnoses: Hyperlipidemia LDL goal <100      sertraline (ZOLOFT) 100 MG tablet Take 1.5 tablets (150 mg) by mouth daily Office visit required for further refills  Qty: 135 tablet, Refills: 0    Associated Diagnoses: JAXON (generalized anxiety disorder)         STOP taking these medications       propranolol ER (INDERAL LA) 80 MG 24 hr capsule Comments:   Reason for Stopping:                   Allergies:         Allergies   Allergen Reactions     No Known Allergies              Brief History of Illness:   Tiffani Brasher is a 52 year old-year-old female who underwent preoperative screening in anticipation for potential bariatric surgery. She was deemed an appropriate candidate for bariatric surgery by the  "consensus of our [place] [facility] team. In the office, surgical options were thoroughly discussed. She was furnished with a copy of our specialized consent form for bariatric surgery. The potential risks as outlined in that document were all thoroughly reviewed. All questions were answered and she wished to proceed.    On the day of surgery, after reviewing the risks, benefits, and possible complications, informed consent was obtained and the patient underwent the above procedure.  There were no complications.  Please see the Operative Report for full details.           Hospital Course:   Tiffani Brasher's hospital course was significant for postoperative nausea with one episode of emesis. She was given anti-emetics, continued on IV fluids, and given 3 doses of decadron. Her nausea improved and she was able to tolerate adequate oral intake of clear liquids. On the morning of POD 2 she did have one episode of dizziness; she slid herself to the ground and landed on her knees. She did not sustain any injuries. She was evaluated by our [place] APRN BEBETO. Tiffani's hospital course was otherwise unremarkable. She did not experience any significant postoperative complications.    On the date of discharge, the patient was discharged to home in stable condition and afebrile.  She verbalized understanding of all discharge instructions and felt comfortable with the discharge plan.  She was asked to call with any further questions or concerns.           Condition on Discharge:      Discharge condition: Stable   Discharge vitals: Blood pressure (!) 152/94, pulse 72, temperature 99.1  F (37.3  C), temperature source Oral, resp. rate 16, height 1.753 m (5' 9\"), weight 137.9 kg (304 lb), last menstrual period 04/04/2019, SpO2 93 %, unknown if currently breastfeeding.           Discharge Disposition:   Discharged to home          Discharge Instructions and Follow-Up:      Tiffani Brasher was asked to follow up with the bariatric " surgical team within 1 week. This appointment has already been scheduled. See AVS for complete discharge instructions.    Tika Bland PA-C  [facility]  [phone]

## 2023-04-28 NOTE — DISCHARGE SUMMARY
[facility] Discharge Summary    Tiffani Brasher MRN# [id number]   Age: 52 year old YOB: 1970     Date of Admission:  4/26/2023  Date of Discharge:  5/1/2023  Admitting Provider:  Al Martinez MD  Discharge Provider:  Tika Bland PA-C with Dr. Martinez  Discharging Service: Bariatric Surgery     Primary Provider: Megan Phillips  Primary Care Physician Phone Number: [phone]          Admission Diagnoses:   Principle Diagnosis: Morbid obesity with BMI of 45.0-49.9 with medical comorbid conditions     Secondary Diagnoses:  Hypertension  Postoperative nausea/vomiting  Depression  PTSD  Insomnia  Obstructive sleep apnea          Discharge Diagnosis:   Same as above          Procedures:   Procedure(s): Laparoscopic sleeve gastrectomy            Discharge Medications:     Current Discharge Medication List      START taking these medications    Details   acetaminophen (TYLENOL) 325 MG tablet Take 2 tablets (650 mg) by mouth every 6 hours as needed for pain    Associated Diagnoses: Morbid obesity with BMI of 40.0-44.9, adult (H)      hyoscyamine (LEVSIN/SL) 0.125 MG sublingual tablet Place 1 tablet (125 mcg) under the tongue every 6 hours for 5 days  Qty: 20 tablet, Refills: 0    Associated Diagnoses: Morbid obesity with BMI of 40.0-44.9, adult (H)      methocarbamol (ROBAXIN) 500 MG tablet Take 1 tablet (500 mg) by mouth every 6 hours for 5 days  Qty: 20 tablet, Refills: 0    Associated Diagnoses: Morbid obesity with BMI of 40.0-44.9, adult (H)      methylPREDNISolone (MEDROL DOSEPAK) 4 MG tablet therapy pack Follow Package Directions  Qty: 21 tablet, Refills: 0    Associated Diagnoses: PONV (postoperative nausea and vomiting)      omeprazole (PRILOSEC) 20 MG DR capsule Take 1 capsule (20 mg) by mouth every morning (before breakfast)  Qty: 90 capsule, Refills: 0    Associated Diagnoses: Morbid obesity with BMI of 40.0-44.9, adult (H)      ondansetron (ZOFRAN ODT) 4 MG ODT tab Take 1 tablet  (4 mg) by mouth every 6 hours as needed for nausea or vomiting  Qty: 15 tablet, Refills: 0    Associated Diagnoses: Morbid obesity with BMI of 40.0-44.9, adult (H)      prochlorperazine (COMPAZINE) 10 MG tablet Take 1 tablet (10 mg) by mouth every 6 hours as needed for nausea or vomiting  Qty: 10 tablet, Refills: 0    Associated Diagnoses: PONV (postoperative nausea and vomiting)      propranolol (INDERAL) 20 MG tablet Take 1 tablet (20 mg) by mouth 2 times daily for 30 days  Qty: 60 tablet, Refills: 0    Associated Diagnoses: Morbid obesity with BMI of 40.0-44.9, adult (H)      simethicone (MYLICON) 40 MG/0.6ML suspension Take 0.6 mLs (40 mg) by mouth 4 times daily as needed for cramping or flatulence (gas pain)  Qty: 45 mL, Refills: 0    Associated Diagnoses: Morbid obesity with BMI of 40.0-44.9, adult (H)         CONTINUE these medications which have NOT CHANGED    Details   ALPRAZolam (XANAX) 0.25 MG tablet Take 1 tablet (0.25 mg) by mouth daily as needed for anxiety  Qty: 15 tablet, Refills: 0    Associated Diagnoses: JAXON (generalized anxiety disorder); PTSD (post-traumatic stress disorder)      Calcium Carb-Cholecalciferol (CALCIUM 600/VITAMIN D) 600-400 MG-UNIT CHEW Take 1 tablet by mouth 2 times daily Take one twice daily and at least 2 hours apart from iron.  Qty: 180 tablet, Refills: 1    Associated Diagnoses: Morbid obesity with BMI of 45.0-49.9, adult (H)      Naphazoline HCl (CLEAR EYES OP) Place 1 drop into both eyes as needed (for dry eye)      rosuvastatin (CRESTOR) 20 MG tablet Take 1 tablet (20 mg) by mouth daily  Qty: 90 tablet, Refills: 3    Comments: To replace atorvastatin  Associated Diagnoses: Hyperlipidemia LDL goal <100      sertraline (ZOLOFT) 100 MG tablet Take 1.5 tablets (150 mg) by mouth daily Office visit required for further refills  Qty: 135 tablet, Refills: 0    Associated Diagnoses: JAXON (generalized anxiety disorder)         STOP taking these medications       propranolol ER  (INDERAL LA) 80 MG 24 hr capsule Comments:   Reason for Stopping:                   Allergies:         Allergies   Allergen Reactions     No Known Allergies              Brief History of Illness:   Tiffani Brasher is a 52 year old-year-old female who underwent preoperative screening in anticipation for potential bariatric surgery. She was deemed an appropriate candidate for bariatric surgery by the consensus of our [place] [facility] team. In the office, surgical options were thoroughly discussed. She was furnished with a copy of our specialized consent form for bariatric surgery. The potential risks as outlined in that document were all thoroughly reviewed. All questions were answered and she wished to proceed.    On the day of surgery, after reviewing the risks, benefits, and possible complications, informed consent was obtained and the patient underwent the above procedure.  There were no complications.  Please see the Operative Report for full details.           Hospital Course:   Tiffani Brasher's hospital course was significant for postoperative nausea emesis. She was given anti-emetics, continued on IV fluids, and given IV steroids during her hospital stay. Her nausea slowly improved, and she was eventually able to tolerate adequate oral intake of clear and full liquids. Tiffani also had difficulty with adequate pain control at times; this gradually improved and was significantly better by the day of discharge. Of note, on the morning of POD 2 she did have one episode of dizziness and slid herself to the ground and landed on her knees. She did not sustain any injuries, no lingering pain in her knees. She was evaluated by our [place] APRN CNP. She did not experience any significant postoperative complications. The remainder of her hospitalization was unremarkable.     On POD 5, Tiffani was discharged to home in stable condition and afebrile.  She verbalized understanding of all discharge instructions and  "felt comfortable with the discharge plan.  She was asked to call with any further questions or concerns.           Condition on Discharge:      Discharge condition: Stable   Discharge vitals: Blood pressure 123/80, pulse 78, temperature 98.5  F (36.9  C), temperature source Oral, resp. rate 18, height 1.753 m (5' 9\"), weight 137.9 kg (304 lb), last menstrual period 04/04/2019, SpO2 96 %, unknown if currently breastfeeding.           Discharge Disposition:   Discharged to home          Discharge Instructions and Follow-Up:      Tiffani Brasher was asked to follow up with the bariatric surgical team within 1 week. This appointment has already been scheduled. See AVS for complete discharge instructions.    Tika Bland PA-C  [facility]  [phone]      "

## 2023-04-28 NOTE — PROGRESS NOTES
General Surgery - Chart Update    Spoke with bedside RN. Patient continues to have poor oral intake and had a small emesis today. She has not taken in adequate PO for discharge.   - Continue current cares with IV fluids, anti-emetics, pain meds as-needed (minimize/avoid narcotics)  - Plan to remain in hospital tonight  - I will reassess patient tomorrow morning. Hopefully nausea will improve and she will be able to discharge home tomorrow      Tika Bland PA-C  [facility]  [phone]

## 2023-04-28 NOTE — PROGRESS NOTES
"Bariatric Surgery Progress Note    Admission Date: 4/26/2023  Today's Date: 4/27/2023         Assessment:      Tiffani Brasher is a 52 year old female POD 2 s/p laparoscopic sleeve gastrectomy for morbid obesity with medical comorbid conditions including hypertension, obstructive sleep apnea.         Plan:   - Nausea improved with Scop patch. Compazine and Zofran available.  - Avoid narcotics as able. Robaxin and levsin scheduled. IV toradol available.  - Okay for combination of clear liquid and full liquid diet today.  - Continue maintenance IV fluids until reaches 500 ml goal.  - Lovenox, ambulate QID, PCDs while resting, encourage IS use  - Prilosec daily  - Monitor blood pressure - improving. On home propranolol (changed from 24hr dosing to BID)    Dispo:  - Anticipate discharge home today if able to reach 500 ml oral intake.  - Follow up with weight loss clinic next week and PCP for BP check in 3-4 weeks.  - Sent with oxycodone, robaxin, levsin, omeprazole, simethicone, and zofran.  - Instructions reviewed. Questions answered.        Interval History:   Tiffani Brasher is seen on surgical rounds. She states some improvement in nausea today, only single emesis yesterday. She did have pain on the right side with moving which caused her knees to buckle and for her to slowly fall on her knees. She denies having pain in the knees following this and denies hitting any other area of her body. She states improvement in dizziness and was able to ambulate twice in halls yesterday. Only flatus on toilet. Voiding well. Hypertensive, vitals otherwise wnl.         Physical Exam:   BP (!) 152/85 (BP Location: Right arm, Patient Position: Semi-Dawson's, Cuff Size: Adult Large)   Pulse 67   Temp 98.7  F (37.1  C) (Oral)   Resp 18   Ht 1.753 m (5' 9\")   Wt 137.9 kg (304 lb)   LMP 04/04/2019   SpO2 93%   BMI 44.89 kg/m    I/O last 3 completed shifts:  In: 1536 [P.O.:360; I.V.:1176]  Out: 1500 [Urine:1500]  General: NAD, " pleasant, alert and oriented x3  Respiratory: non-labored breathing  Cardiovascular: regular heart rate and rhythm  Abdomen: soft, nondistended, appropriately tender around incisions. Wearing abdominal binder  Incisions: clean, dry, and intact. No erythema or drainage  Extremities: no lower extremity edema, no ecchymosis or pain at knees, no calf tenderness. Wearing PCDs    LABS:  Recent Labs   Lab Test 04/27/23  0753 04/24/23  1228 02/16/23  1658 10/13/22  1516   WBC  --  6.1 7.1 7.2   HGB 12.7 14.0 13.6 13.6   MCV  --  99 90 97   PLT  --  225 232 238      Recent Labs   Lab Test 04/27/23  0753 04/26/23  1236 04/26/23  0621 04/24/23  1228 03/21/22  1224 08/30/21  1559   POTASSIUM 3.5  --  3.8 4.3 4.1 4.0   CHLORIDE 99  --   --  107 110* 107   CO2 27  --   --  26 27 26   BUN  --   --   --  11.5 13 9   CR  --  0.80  --  0.80 0.74 0.61   ANIONGAP 11  --   --  10 4 4      Recent Labs   Lab Test 04/24/23  1228 03/21/22  1224 03/16/22  0920 08/30/21  1559 03/30/21  0855   ALBUMIN 4.2 3.3*  --   --  3.3*   BILITOTAL 0.3 0.2  --   --  0.4   ALT 14 25  --  31 35   AST 24 14 12  --  21   ALKPHOS 78 81  --   --  75   -------------------------------    Lashawn Palma PA-C  [facility]  [phone]

## 2023-04-28 NOTE — PROGRESS NOTES
[facility] NP Note:     Tiffani Brasher is a 52yoF with a past medical history of HTN, obesity and MAIN. She is POD #2 s/p lapraoscopic sleeve gastrectomy for morbid obesity.     We were called to the bedside this morning to evaluate the patient after she attempted to independently use the bathroom, got dizzy and slid herself to the ground, landing on her knees. On exam, she is resting comfortably in bed, moving all 4 extremities spontaneously, and denies any pain. She reports that she changed positions too quickly, causing her dizziness. She denies any SOB, chest pain, N/V before, during or following event.     Physical Exam  Vitals and nursing note reviewed.   Constitutional:       General: She is awake.      Appearance: She is obese.   Cardiovascular:      Rate and Rhythm: Normal rate and regular rhythm.      Heart sounds: Normal heart sounds.   Pulmonary:      Effort: Pulmonary effort is normal.      Breath sounds: Normal breath sounds.   Abdominal:      Palpations: Abdomen is soft.   Skin:     General: Skin is warm and dry.   Neurological:      Mental Status: She is alert and oriented to person, place, and time.      Gait: Gait is intact.   Psychiatric:         Behavior: Behavior is cooperative.       Patient was assisted to the washroom by myself and HALLE Suero CNP.  Patient complained of some dizziness when ambulating back to bed. Will obtain orthostatics period. She returned to bed without incident. Educated patient on pressing the call light for assistance if she needs to get out of bed for any reason. Please notify surgical team if orthostatics are positive to consider fluid bolus.       HALLE Moraes CNP  [facility]  Securely message with the Vocera Web Console (learn more here)  Text page via [employer] Paging/Directory

## 2023-04-29 LAB
GLUCOSE BLDC GLUCOMTR-MCNC: 97 MG/DL (ref 70–99)
PLATELET # BLD AUTO: 206 10E3/UL (ref 150–450)

## 2023-04-29 PROCEDURE — 36415 COLL VENOUS BLD VENIPUNCTURE: CPT | Performed by: PHYSICIAN ASSISTANT

## 2023-04-29 PROCEDURE — 258N000003 HC RX IP 258 OP 636: Performed by: PHYSICIAN ASSISTANT

## 2023-04-29 PROCEDURE — 85049 AUTOMATED PLATELET COUNT: CPT | Performed by: PHYSICIAN ASSISTANT

## 2023-04-29 PROCEDURE — 250N000013 HC RX MED GY IP 250 OP 250 PS 637: Performed by: PHYSICIAN ASSISTANT

## 2023-04-29 PROCEDURE — 250N000011 HC RX IP 250 OP 636: Performed by: PHYSICIAN ASSISTANT

## 2023-04-29 PROCEDURE — 120N000001 HC R&B MED SURG/OB

## 2023-04-29 RX ORDER — METHYLPREDNISOLONE 4 MG
TABLET, DOSE PACK ORAL
Qty: 21 TABLET | Refills: 0 | Status: SHIPPED | OUTPATIENT
Start: 2023-04-29 | End: 2023-05-31

## 2023-04-29 RX ORDER — METHOCARBAMOL 500 MG/1
500 TABLET, FILM COATED ORAL 4 TIMES DAILY PRN
Status: DISCONTINUED | OUTPATIENT
Start: 2023-04-29 | End: 2023-05-01 | Stop reason: HOSPADM

## 2023-04-29 RX ORDER — METHYLPREDNISOLONE SODIUM SUCCINATE 40 MG/ML
8 INJECTION, POWDER, LYOPHILIZED, FOR SOLUTION INTRAMUSCULAR; INTRAVENOUS EVERY 8 HOURS
Status: COMPLETED | OUTPATIENT
Start: 2023-04-29 | End: 2023-04-30

## 2023-04-29 RX ADMIN — KETOROLAC TROMETHAMINE 15 MG: 15 INJECTION, SOLUTION INTRAMUSCULAR; INTRAVENOUS at 10:01

## 2023-04-29 RX ADMIN — OMEPRAZOLE 20 MG: 20 CAPSULE, DELAYED RELEASE ORAL at 06:35

## 2023-04-29 RX ADMIN — PROPRANOLOL HYDROCHLORIDE 20 MG: 20 TABLET ORAL at 08:27

## 2023-04-29 RX ADMIN — HYOSCYAMINE SULFATE 125 MCG: 0.12 TABLET SUBLINGUAL at 17:20

## 2023-04-29 RX ADMIN — SIMETHICONE 40 MG: 20 EMULSION ORAL at 08:24

## 2023-04-29 RX ADMIN — SODIUM CHLORIDE, POTASSIUM CHLORIDE, SODIUM LACTATE AND CALCIUM CHLORIDE: 600; 310; 30; 20 INJECTION, SOLUTION INTRAVENOUS at 08:24

## 2023-04-29 RX ADMIN — SODIUM CHLORIDE, POTASSIUM CHLORIDE, SODIUM LACTATE AND CALCIUM CHLORIDE: 600; 310; 30; 20 INJECTION, SOLUTION INTRAVENOUS at 21:37

## 2023-04-29 RX ADMIN — HYOSCYAMINE SULFATE 125 MCG: 0.12 TABLET SUBLINGUAL at 14:25

## 2023-04-29 RX ADMIN — METHYLPREDNISOLONE SODIUM SUCCINATE 8 MG: 40 INJECTION, POWDER, FOR SOLUTION INTRAMUSCULAR; INTRAVENOUS at 10:01

## 2023-04-29 RX ADMIN — OXYCODONE HYDROCHLORIDE 10 MG: 5 TABLET ORAL at 23:47

## 2023-04-29 RX ADMIN — SODIUM CHLORIDE, POTASSIUM CHLORIDE, SODIUM LACTATE AND CALCIUM CHLORIDE: 600; 310; 30; 20 INJECTION, SOLUTION INTRAVENOUS at 17:21

## 2023-04-29 RX ADMIN — ENOXAPARIN SODIUM 40 MG: 40 INJECTION SUBCUTANEOUS at 21:36

## 2023-04-29 RX ADMIN — DIAZEPAM 2.5 MG: 5 INJECTION INTRAMUSCULAR; INTRAVENOUS at 21:36

## 2023-04-29 RX ADMIN — OXYCODONE HYDROCHLORIDE 10 MG: 5 TABLET ORAL at 02:05

## 2023-04-29 RX ADMIN — METHYLPREDNISOLONE SODIUM SUCCINATE 8 MG: 40 INJECTION, POWDER, FOR SOLUTION INTRAMUSCULAR; INTRAVENOUS at 17:19

## 2023-04-29 RX ADMIN — PROCHLORPERAZINE EDISYLATE 10 MG: 5 INJECTION INTRAMUSCULAR; INTRAVENOUS at 08:24

## 2023-04-29 RX ADMIN — ENOXAPARIN SODIUM 40 MG: 40 INJECTION SUBCUTANEOUS at 08:24

## 2023-04-29 RX ADMIN — HYOSCYAMINE SULFATE 125 MCG: 0.12 TABLET SUBLINGUAL at 21:36

## 2023-04-29 RX ADMIN — SODIUM CHLORIDE, POTASSIUM CHLORIDE, SODIUM LACTATE AND CALCIUM CHLORIDE: 600; 310; 30; 20 INJECTION, SOLUTION INTRAVENOUS at 02:05

## 2023-04-29 RX ADMIN — SERTRALINE HYDROCHLORIDE 150 MG: 50 TABLET ORAL at 08:27

## 2023-04-29 RX ADMIN — PROPRANOLOL HYDROCHLORIDE 20 MG: 20 TABLET ORAL at 21:36

## 2023-04-29 RX ADMIN — HYOSCYAMINE SULFATE 125 MCG: 0.12 TABLET SUBLINGUAL at 08:27

## 2023-04-29 RX ADMIN — ONDANSETRON 4 MG: 2 INJECTION INTRAMUSCULAR; INTRAVENOUS at 05:06

## 2023-04-29 ASSESSMENT — ACTIVITIES OF DAILY LIVING (ADL)
ADLS_ACUITY_SCORE: 25
ADLS_ACUITY_SCORE: 22
ADLS_ACUITY_SCORE: 22
ADLS_ACUITY_SCORE: 25
ADLS_ACUITY_SCORE: 22
ADLS_ACUITY_SCORE: 22
ADLS_ACUITY_SCORE: 25
ADLS_ACUITY_SCORE: 22

## 2023-04-29 NOTE — PROGRESS NOTES
Overnight   [id number]    Pt vital signs stable on room air. Able to sleep between cares. Pain well controlled utilizing oxycodone. Alert and oriented x4. Independent in the room. Voiding adequately. IV fluids infusing. No significant oral intake overnight. Reports intermittent nausea. Zofran given. Abd incisions CDI. Denies flatus.

## 2023-04-29 NOTE — PROGRESS NOTES
VSS. A/O. SBA. Minimal pain. Tolerating some clears now. IVF cont. Voiding fine. -bs/gas. Possible d'c home tomorrow.

## 2023-04-29 NOTE — PROGRESS NOTES
"Bariatric Surgery Progress Note    Admission Date: 4/26/2023  Today's Date: 4/29/2023         Assessment:      Tiffani Brasher is a 52 year old female POD 3 s/p laparoscopic sleeve gastrectomy for morbid obesity with medical comorbid conditions including hypertension, obstructive sleep apnea.         Plan:   - Continue Scop patch. Compazine and Zofran available.  - Avoid narcotics as able. Robaxin and levsin scheduled. IV toradol available, try IV valium today.  - Clear liquid diet as able, currently minimal PO intake  - Continue maintenance IV fluids   - Lovenox, ambulate QID, PCDs while resting, encourage IS use  - Prilosec daily  - Monitor blood pressure - improving. On home propranolol (changed from 24hr dosing to BID)  - Discussed with Dr. Martinez. Will give methylprednisolone to improve swelling/nausea  - Recheck BMP and hemoglobin tomorrow     Dispo: do not anticipate discharge home today. Needs significant improvement in nausea and PO intake prior to discharge        Interval History:   Afebrile overnight, vitals stable on room air with exception of some hypertension. Pressures 140s/80s-90s overnight and this morning. Tiffani is discouraged by the length of her hospital stay and persistent nausea. She has a hard time after taking pills, even gets nauseous from swallowing saliva at times. Knees are not sore today. Surgical pain mainly on her left side.          Physical Exam:   BP (!) 143/87 (BP Location: Right arm)   Pulse 63   Temp 98.7  F (37.1  C) (Oral)   Resp 18   Ht 1.753 m (5' 9\")   Wt 137.9 kg (304 lb)   LMP 04/04/2019   SpO2 95%   BMI 44.89 kg/m    I/O last 3 completed shifts:  In: 7659 [P.O.:490; I.V.:7169]  Out: 530 [Urine:500; Emesis/NG output:30]  General: NAD, pleasant, alert and oriented x3  Cardiovascular: regular rate and rhythm; S1 and S2 distinct without murmur   Respiratory: lungs clear to auscultation bilaterally without wheezes, rales or rhonchi   Abdomen: soft, appropriately " tender around incisions, non distended   Incisions: clean, dry, and intact. No erythema or drainage  Extremities: no lower extremity edema, no calf tenderness    LABS:  Recent Labs   Lab Test 04/29/23  0603 04/27/23  0753 04/24/23  1228 02/16/23  1658 10/13/22  1516   WBC  --   --  6.1 7.1 7.2   HGB  --  12.7 14.0 13.6 13.6   MCV  --   --  99 90 97     --  225 232 238      -------------------------------    Tika Bland PA-C  [facility]  [phone]

## 2023-04-29 NOTE — PROGRESS NOTES
VSS ex HTN. A/O. SBA. Dilaudid/oxy given. Poor oral intake, improving, Nausea once, zofran given. abdo incisions CDI. -bs/gas. Voiding fine. Orthostatic neg.

## 2023-04-30 LAB
ANION GAP SERPL CALCULATED.3IONS-SCNC: 12 MMOL/L (ref 7–15)
BUN SERPL-MCNC: 12.2 MG/DL (ref 6–20)
CALCIUM SERPL-MCNC: 9 MG/DL (ref 8.6–10)
CHLORIDE SERPL-SCNC: 104 MMOL/L (ref 98–107)
CREAT SERPL-MCNC: 0.62 MG/DL (ref 0.51–0.95)
DEPRECATED HCO3 PLAS-SCNC: 23 MMOL/L (ref 22–29)
GFR SERPL CREATININE-BSD FRML MDRD: >90 ML/MIN/1.73M2
GLUCOSE SERPL-MCNC: 98 MG/DL (ref 70–99)
HGB BLD-MCNC: 10.3 G/DL (ref 11.7–15.7)
POTASSIUM SERPL-SCNC: 4.1 MMOL/L (ref 3.4–5.3)
SODIUM SERPL-SCNC: 139 MMOL/L (ref 136–145)

## 2023-04-30 PROCEDURE — 250N000011 HC RX IP 250 OP 636: Performed by: PHYSICIAN ASSISTANT

## 2023-04-30 PROCEDURE — 999N000127 HC STATISTIC PERIPHERAL IV START W US GUIDANCE

## 2023-04-30 PROCEDURE — 250N000013 HC RX MED GY IP 250 OP 250 PS 637: Performed by: PHYSICIAN ASSISTANT

## 2023-04-30 PROCEDURE — 85018 HEMOGLOBIN: CPT | Performed by: PHYSICIAN ASSISTANT

## 2023-04-30 PROCEDURE — 258N000003 HC RX IP 258 OP 636: Performed by: PHYSICIAN ASSISTANT

## 2023-04-30 PROCEDURE — 80048 BASIC METABOLIC PNL TOTAL CA: CPT | Performed by: PHYSICIAN ASSISTANT

## 2023-04-30 PROCEDURE — 250N000009 HC RX 250: Performed by: PHYSICIAN ASSISTANT

## 2023-04-30 PROCEDURE — 120N000001 HC R&B MED SURG/OB

## 2023-04-30 PROCEDURE — 36415 COLL VENOUS BLD VENIPUNCTURE: CPT | Performed by: PHYSICIAN ASSISTANT

## 2023-04-30 RX ADMIN — OXYCODONE HYDROCHLORIDE 5 MG: 5 TABLET ORAL at 17:25

## 2023-04-30 RX ADMIN — PROPRANOLOL HYDROCHLORIDE 20 MG: 20 TABLET ORAL at 08:04

## 2023-04-30 RX ADMIN — ENOXAPARIN SODIUM 40 MG: 40 INJECTION SUBCUTANEOUS at 08:03

## 2023-04-30 RX ADMIN — SODIUM CHLORIDE, POTASSIUM CHLORIDE, SODIUM LACTATE AND CALCIUM CHLORIDE: 600; 310; 30; 20 INJECTION, SOLUTION INTRAVENOUS at 22:54

## 2023-04-30 RX ADMIN — ENOXAPARIN SODIUM 40 MG: 40 INJECTION SUBCUTANEOUS at 21:40

## 2023-04-30 RX ADMIN — OXYCODONE HYDROCHLORIDE 5 MG: 5 TABLET ORAL at 13:04

## 2023-04-30 RX ADMIN — METHYLPREDNISOLONE SODIUM SUCCINATE 8 MG: 40 INJECTION, POWDER, FOR SOLUTION INTRAMUSCULAR; INTRAVENOUS at 00:31

## 2023-04-30 RX ADMIN — SCOPALAMINE 1 PATCH: 1 PATCH, EXTENDED RELEASE TRANSDERMAL at 10:23

## 2023-04-30 RX ADMIN — SODIUM CHLORIDE, POTASSIUM CHLORIDE, SODIUM LACTATE AND CALCIUM CHLORIDE: 600; 310; 30; 20 INJECTION, SOLUTION INTRAVENOUS at 03:48

## 2023-04-30 RX ADMIN — OXYCODONE HYDROCHLORIDE 5 MG: 5 TABLET ORAL at 05:19

## 2023-04-30 RX ADMIN — OXYCODONE HYDROCHLORIDE 5 MG: 5 TABLET ORAL at 22:49

## 2023-04-30 RX ADMIN — HYOSCYAMINE SULFATE 125 MCG: 0.12 TABLET SUBLINGUAL at 08:03

## 2023-04-30 RX ADMIN — OMEPRAZOLE 20 MG: 20 CAPSULE, DELAYED RELEASE ORAL at 06:36

## 2023-04-30 RX ADMIN — PROPRANOLOL HYDROCHLORIDE 20 MG: 20 TABLET ORAL at 21:40

## 2023-04-30 RX ADMIN — SODIUM CHLORIDE, POTASSIUM CHLORIDE, SODIUM LACTATE AND CALCIUM CHLORIDE: 600; 310; 30; 20 INJECTION, SOLUTION INTRAVENOUS at 10:24

## 2023-04-30 RX ADMIN — SODIUM CHLORIDE, POTASSIUM CHLORIDE, SODIUM LACTATE AND CALCIUM CHLORIDE: 600; 310; 30; 20 INJECTION, SOLUTION INTRAVENOUS at 08:04

## 2023-04-30 RX ADMIN — SERTRALINE HYDROCHLORIDE 150 MG: 50 TABLET ORAL at 08:03

## 2023-04-30 ASSESSMENT — ACTIVITIES OF DAILY LIVING (ADL)
ADLS_ACUITY_SCORE: 25
ADLS_ACUITY_SCORE: 23
ADLS_ACUITY_SCORE: 25
ADLS_ACUITY_SCORE: 23
ADLS_ACUITY_SCORE: 25
ADLS_ACUITY_SCORE: 23
ADLS_ACUITY_SCORE: 23
ADLS_ACUITY_SCORE: 25
ADLS_ACUITY_SCORE: 25
ADLS_ACUITY_SCORE: 23
ADLS_ACUITY_SCORE: 25
ADLS_ACUITY_SCORE: 25

## 2023-04-30 NOTE — PROGRESS NOTES
VSS. A/O. Ind. abdo incisions CDI. Minimal pain, oxy given. Tolerating diet, denies N/V. Walking ind. Took a shower. Refused to go home, doesn't feel safe and concern to be discharged home with no help.

## 2023-04-30 NOTE — PROGRESS NOTES
"Bariatric Surgery Progress Note    Admission Date: 4/26/2023  Today's Date: 4/30/2023         Assessment:      Tiffani Brasher is a 52 year old female POD 4 s/p laparoscopic sleeve gastrectomy for morbid obesity with medical comorbid conditions including hypertension, obstructive sleep apnea.         Plan:   - Tiffani is having some improvement in pain control, nausea, and PO intake. Still not quite taking adequate PO   - Continue to monitor today. Possible discharge home this afternoon vs remain in hospital overnight  - Postop appointments with weight loss clinic are scheduled for tomorrow morning  - Clear / full liquids as tolerated. Continue maintenance IV fluids  - Lovenox, ambulate QID, PCDs while resting, encourage IS use  - Prilosec daily  - Blood pressure stable (spikes when pain uncontrolled). Continue propranolol BID  - Complete methylprednisolone x 3 doses. Plan for Medrol dose kezia at discharge  - BMP and hemoglobin stable. Hemoglobin has drifted some - likely dilutional  - Change Scop patch today. Compazine and zofran prn nausea  - Pain control with tylenol, robaxin, oxycodone. Levsin and simethicone available  - OK to shower today    Dispo: pending improvement today. Possibly home later today vs remain in hospital overnight  - I will check back in with patient this afternoon        Interval History:   Tmax 100.2 overnight, likely some atelectasis - working on IS. Intermittent hypertension, mainly when pain is uncontrolled. Tiffani feels some improvement and has been able to tolerate more liquids, but she is still struggling at times with significant nausea. Needs to stay on top of the pain medication or she gets behind. Walking well in halls. She is spitting saliva, but has not vomited since the one episode yesterday.          Physical Exam:   /72 (BP Location: Right arm)   Pulse 69   Temp 98.4  F (36.9  C) (Oral)   Resp 18   Ht 1.753 m (5' 9\")   Wt 137.9 kg (304 lb)   LMP 04/04/2019   " SpO2 98%   BMI 44.89 kg/m    I/O last 3 completed shifts:  In: 2628 [P.O.:350; I.V.:2278]  Out: 850 [Urine:800; Emesis/NG output:50]  General: NAD, pleasant, alert and oriented x3  Cardiovascular: regular rate and rhythm; S1 and S2 distinct without murmur   Respiratory: lungs clear to auscultation bilaterally without wheezes, rales or rhonchi   Abdomen: soft, appropriately tender around incisions, non distended   Incisions: clean, dry, and intact. No erythema or drainage  Extremities: no lower extremity edema, no calf tenderness. Wearing PCDs    LABS:  Recent Labs   Lab Test 04/30/23  0740 04/29/23  0603 04/27/23  0753 04/24/23  1228 02/16/23  1658 10/13/22  1516   WBC  --   --   --  6.1 7.1 7.2   HGB 10.3*  --  12.7 14.0 13.6 13.6   MCV  --   --   --  99 90 97   PLT  --  206  --  225 232 238      Recent Labs   Lab Test 04/30/23  0740 04/27/23  0753 04/26/23  1236 04/26/23  0621 04/24/23  1228 03/21/22  1224 03/21/22  1224   POTASSIUM 4.1 3.5  --  3.8 4.3   < > 4.1   CHLORIDE 104 99  --   --  107  --  110*   CO2 23 27  --   --  26  --  27   BUN 12.2  --   --   --  11.5  --  13   CR 0.62  --  0.80  --  0.80  --  0.74   ANIONGAP 12 11  --   --  10  --  4    < > = values in this interval not displayed.      Recent Labs   Lab Test 04/24/23  1228 03/21/22  1224 03/16/22  0920 08/30/21  1559 03/30/21  0855   ALBUMIN 4.2 3.3*  --   --  3.3*   BILITOTAL 0.3 0.2  --   --  0.4   ALT 14 25  --  31 35   AST 24 14 12  --  21   ALKPHOS 78 81  --   --  75      No lab results found.  No lab results found.    -------------------------------    Tika Bland PA-C  [facility]  [phone]

## 2023-04-30 NOTE — PROGRESS NOTES
Overnight    Pt vital signs stable on room air. Able to sleep between cares. Pain well controlled utilizing oxycodone. No significant episodes of nausea / vomiting. Reports no relief utilizing valium. Alert and oriented x4. Independent in the room. Voiding adequately. IV fluids infusing. Reports passing little gas. Expressing concerns regarding pain control and overall length of stay.

## 2023-04-30 NOTE — PROGRESS NOTES
General Surgery - Chart Update    Patient is having a better day today. Per RN, she has been walking in the hallway, having good intake of full liquids. Denies nausea.    If patient continues to do well this afternoon, plan for discharge home later today. Will have close follow-up with her tomorrow morning in the weight loss clinic and can continue to manage any ongoing symptoms as an outpatient. Start Medrol tomorrow morning.      Tika Bland PA-C  [facility]  [phone]

## 2023-05-01 ENCOUNTER — DOCUMENTATION ONLY (OUTPATIENT)
Dept: SURGERY | Facility: CLINIC | Age: 53
End: 2023-05-01

## 2023-05-01 ENCOUNTER — TELEPHONE (OUTPATIENT)
Dept: NEUROLOGY | Facility: CLINIC | Age: 53
End: 2023-05-01

## 2023-05-01 VITALS
HEART RATE: 73 BPM | RESPIRATION RATE: 18 BRPM | BODY MASS INDEX: 43.4 KG/M2 | DIASTOLIC BLOOD PRESSURE: 75 MMHG | SYSTOLIC BLOOD PRESSURE: 128 MMHG | WEIGHT: 293 LBS | OXYGEN SATURATION: 96 % | HEIGHT: 69 IN | TEMPERATURE: 98.5 F

## 2023-05-01 PROCEDURE — 250N000013 HC RX MED GY IP 250 OP 250 PS 637: Performed by: PHYSICIAN ASSISTANT

## 2023-05-01 PROCEDURE — 258N000003 HC RX IP 258 OP 636: Performed by: PHYSICIAN ASSISTANT

## 2023-05-01 PROCEDURE — 250N000011 HC RX IP 250 OP 636: Performed by: PHYSICIAN ASSISTANT

## 2023-05-01 RX ORDER — BISACODYL 10 MG
10 SUPPOSITORY, RECTAL RECTAL ONCE
Status: COMPLETED | OUTPATIENT
Start: 2023-05-01 | End: 2023-05-01

## 2023-05-01 RX ORDER — PROCHLORPERAZINE MALEATE 10 MG
10 TABLET ORAL EVERY 6 HOURS PRN
Qty: 10 TABLET | Refills: 0 | Status: SHIPPED | OUTPATIENT
Start: 2023-05-01 | End: 2023-05-31

## 2023-05-01 RX ORDER — OXYCODONE HYDROCHLORIDE 5 MG/1
5 TABLET ORAL EVERY 6 HOURS PRN
Qty: 15 TABLET | Refills: 0 | Status: SHIPPED | OUTPATIENT
Start: 2023-05-01 | End: 2023-12-12

## 2023-05-01 RX ADMIN — OXYCODONE HYDROCHLORIDE 5 MG: 5 TABLET ORAL at 04:01

## 2023-05-01 RX ADMIN — PROPRANOLOL HYDROCHLORIDE 20 MG: 20 TABLET ORAL at 09:55

## 2023-05-01 RX ADMIN — OMEPRAZOLE 20 MG: 20 CAPSULE, DELAYED RELEASE ORAL at 06:36

## 2023-05-01 RX ADMIN — SERTRALINE HYDROCHLORIDE 150 MG: 50 TABLET ORAL at 09:55

## 2023-05-01 RX ADMIN — SODIUM CHLORIDE, POTASSIUM CHLORIDE, SODIUM LACTATE AND CALCIUM CHLORIDE: 600; 310; 30; 20 INJECTION, SOLUTION INTRAVENOUS at 10:30

## 2023-05-01 RX ADMIN — ENOXAPARIN SODIUM 40 MG: 40 INJECTION SUBCUTANEOUS at 09:55

## 2023-05-01 RX ADMIN — BISACODYL 10 MG: 10 SUPPOSITORY RECTAL at 09:55

## 2023-05-01 RX ADMIN — SODIUM CHLORIDE, POTASSIUM CHLORIDE, SODIUM LACTATE AND CALCIUM CHLORIDE: 600; 310; 30; 20 INJECTION, SOLUTION INTRAVENOUS at 04:05

## 2023-05-01 RX ADMIN — MAGNESIUM HYDROXIDE 30 ML: 400 SUSPENSION ORAL at 09:55

## 2023-05-01 RX ADMIN — OXYCODONE HYDROCHLORIDE 5 MG: 5 TABLET ORAL at 10:30

## 2023-05-01 RX ADMIN — OXYCODONE HYDROCHLORIDE 5 MG: 5 TABLET ORAL at 16:23

## 2023-05-01 ASSESSMENT — ACTIVITIES OF DAILY LIVING (ADL)
ADLS_ACUITY_SCORE: 23

## 2023-05-01 NOTE — TELEPHONE ENCOUNTER
Health Call Center    Phone Message    May a detailed message be left on voicemail: no     Reason for Call: Other: Patient is schedule to have an EMG done on 05/03/23 with Dr. Guo. Patient is being discharged from hospital on 05/02/23 due to complications from surgery.     Patient is unsure if she should have this EMG completed. Please call patient back to advise at # [phone]    Action Taken: Message routed to:  [facility] ([facility] neurology    Travel Screening: Not Applicable

## 2023-05-01 NOTE — PROGRESS NOTES
MARISOL Villela called to inform clinic that pt to be discharged today.  Will go home with Medrol Dose Pack today.  Also received steroids when inpt - felt to be a bit tight.    Per Dr. Martinez, pt to be seen Thurs 5/4/23 for 1st week PO.  [facility] to call to set up tomorrow.  Kimberly Mahan, MS, RD, RN

## 2023-05-01 NOTE — PROGRESS NOTES
"General Surgery Progress Note    Admission Date: 4/26/2023  Today's Date: 5/1/2023         Assessment:      Tiffani Brasher is a 52 year old female POD 5 s/p laparoscopic sleeve gastrectomy for morbid obesity with medical comorbid conditions including hypertension, obstructive sleep apnea.         Plan:   - Discharge home today. Instructions reviewed, questions answered. Weight loss clinic will call Tiffani to set up next appointment  - Prior to discharge, stimulate BM. Will give dulcolax suppository and milk of mag this morning, patient in agreement  - Start Medrol dose kezia tomorrow morning  - Continue with pain and nausea medication as-needed. Scop patch in place at discharge, will prescribe zofran and compazine. Work on minimizing oxycodone, use robaxin and tylenol around the clock for the next couple days  - Prilosec daily  - Bariatric full liquids, work on hydration and protein. Intake improving  - Blood pressures stable, continue propranolol BID    Dispo: home today        Interval History:   Afebrile overnight, vitals stable on room air. Tiffani tells me that she is feeling much better today! She is no longer having to spit up saliva. Her nausea is still present at times, but has significantly improved. Pain control is improving as well, she has been able to decrease her dose and frequency of oxycodone. The nausea makes her feel a bit dizzy at times, but she is up walking well and feels safe going home. Tiffani reports passing gas yesterday, but still has not had a BM since surgery.          Physical Exam:   /80 (BP Location: Right arm)   Pulse 78   Temp 98.5  F (36.9  C) (Oral)   Resp 18   Ht 1.753 m (5' 9\")   Wt 137.9 kg (304 lb)   LMP 04/04/2019   SpO2 96%   BMI 44.89 kg/m    I/O last 3 completed shifts:  In: 3581 [P.O.:500; I.V.:3081]  Out: -   General: NAD, pleasant, alert and oriented x3  Cardiovascular: regular rate and rhythm; S1 and S2 distinct without murmur   Respiratory: lungs clear " to auscultation bilaterally without wheezes, rales or rhonchi   Abdomen: soft, appropriately tender around incisions, non distended   Incisions: clean, dry, and intact. No erythema or drainage  Extremities: no lower extremity edema, no calf tenderness. Wearing PCDs        -------------------------------    Tika Bland PA-C  [facility]  [phone]

## 2023-05-01 NOTE — PROGRESS NOTES
Overnight    No significant changes overnight. VSS on RA. Oxycodone for pain control. Ind. Voiding. Denies N/V.

## 2023-05-01 NOTE — PLAN OF CARE
Goal Outcome Evaluation:       A/Ox4. VSS on RA. Pain controlled w/ prn oxy and ice. 5 abdominal lap sites CDI. One BM today. Ambulating well. Good oral intake. Went over discharge paperwork and meds. Removed IV. Discharged w/ friend down to door 6

## 2023-05-02 ENCOUNTER — PATIENT OUTREACH (OUTPATIENT)
Dept: FAMILY MEDICINE | Facility: CLINIC | Age: 53
End: 2023-05-02
Payer: COMMERCIAL

## 2023-05-02 NOTE — TELEPHONE ENCOUNTER
Called patient regarding PCP message below. She verbalized understanding and will call if concerns, otherwise will see PCP at upcoming visit.     Sarah Burnett RN on 5/2/2023 at 1:49 PM

## 2023-05-02 NOTE — TELEPHONE ENCOUNTER
"To PCP    Patient would like you to review propanolol in chart as it was changed in the hospital stay.    Appointments in Next Year    May 31, 2023  3:00 PM  (Arrive by 2:40 PM)  Provider Visit with Megan Phillips MD  [facility] ([facility] [place] ) [phone]          ED/Discharge Protocol    \"Hi, my name is Sarah Burnett RN, a registered nurse, and I am calling on behalf of Dr. Phillips's office at [facility].  I am calling to follow up and see how things are going for you after your recent visit.\"    \"I see that you were in the (ER/UC/IP) on 04/26/23.    How are you doing now that you are home?\" \"First of all, please thank Dr. Phillips regarding this call. I see a huge difference being outside of the hospital. They were cycling me with narcotics and I feel better being out. We are now at 5 mg of oxy and I'm doing that once every 7-8 hours which is manageable.\"    Is patient experiencing symptoms that may require a hospital visit?  No    Discharge Instructions    \"Let's review your discharge instructions.  What is/are the follow-up recommendations?  Pt. Response: They sent me home with some drugs    \"Were you instructed to make a follow-up appointment?\"  Pt. Response: Yes.  Has appointment been made?   Yes      \"When you see the provider, I would recommend that you bring your discharge instructions with you.    Medications    \"How many new medications are you on since your hospitalization/ED visit?\"    0-1  \"How many of your current medicines changed (dose, timing, name, etc.) while you were in the hospital/ED visit?\"   0-1  \"Do you have questions about your medications?\"   No  \"Were you newly diagnosed with heart failure, COPD, diabetes or did you have a heart attack?\"   No  For patients on insulin: \"Did you start on insulin in the hospital or did you have your insulin dose changed?\"   No  Post Discharge Medication Reconciliation Status: discharge medications reconciled, " "continue medications without change.    Was MTM referral placed (*Make sure to put transitions as reason for referral)?   No    Call Summary    \"Do you have any questions or concerns about your condition or care plan at the moment?\"    No  Triage nurse advice given: Please keep follow up appointments.     Patient was in ER 1 in the past year (assess appropriateness of ER visits.)      \"If you have questions or things don't continue to improve, we encourage you contact us through the main clinic number,  [phone].  Even if the clinic is not open, triage nurses are available 24/7 to help you.     We would like you to know that our clinic has extended hours (provide information).  We also have urgent care (provide details on closest location and hours/contact info)\"      \"Thank you for your time and take care!\"      Sarah Burnett RN on 5/2/2023 at 11:22 AM      "

## 2023-05-02 NOTE — TELEPHONE ENCOUNTER
What type of discharge? Inpatient  Risk of Hospital admission or ED visit: 52%  Is a TCM episode required? Yes  When should the patient follow up with PCP? 7 days of discharge.    Sarah Burnett RN  [facility]

## 2023-05-02 NOTE — TELEPHONE ENCOUNTER
Megan Phillips MD  You; Cs Triage Im 1 hour ago (11:47 AM)     KS  I reviewed  propranolol you dose 20 mg twice daily in the chart.  Hold if dizzy or lightheaded or heart rate less than 60 bpm.   Dr Phillips

## 2023-05-03 ENCOUNTER — TELEPHONE (OUTPATIENT)
Dept: SURGERY | Facility: CLINIC | Age: 53
End: 2023-05-03

## 2023-05-03 DIAGNOSIS — G89.18 POSTOPERATIVE PAIN: Primary | ICD-10-CM

## 2023-05-03 DIAGNOSIS — Z98.84 BARIATRIC SURGERY STATUS: ICD-10-CM

## 2023-05-05 RX ORDER — TRAMADOL HYDROCHLORIDE 50 MG/1
50 TABLET ORAL EVERY 6 HOURS PRN
Qty: 10 TABLET | Refills: 0 | Status: SHIPPED | OUTPATIENT
Start: 2023-05-05 | End: 2023-05-08

## 2023-05-05 NOTE — TELEPHONE ENCOUNTER
Called pt to inform that 10 Tramadol were sent and to use judiciously due with Zoloft can have increased effect on Tramadol per [employer].  Patient verbalized understanding and is agreeable to plan.  Kimberly Mahan, MS, RD, RN

## 2023-05-07 ENCOUNTER — HEALTH MAINTENANCE LETTER (OUTPATIENT)
Age: 53
End: 2023-05-07

## 2023-05-08 ENCOUNTER — TELEPHONE (OUTPATIENT)
Dept: SURGERY | Facility: CLINIC | Age: 53
End: 2023-05-08
Payer: COMMERCIAL

## 2023-05-08 NOTE — TELEPHONE ENCOUNTER
Pt had gastric sleeve 4/26/23.    Calls today with sharp pain to right lower abdomen.  It is near the area where there are bruises from Lovenox injections.  The bruises are fading.   The discomfort is tender in one specific area.   This morning was 9/10 when got up - when horizontal in bed and careful - no discomfort on left - right side lots of discomfort.     Occurs when moves in a certain way and bends knees or moves in bed.  Explained that movement is muscular in nature.     Recommended use heat, ice on off for 20-30 minutes, Icy Hot, and Tyl up to 3000 mg daily.    Pt states took one Tramadol and one Tyl this am.  Is trying to get drugs out of her body - wants to know if having discomfort and not covering it up.     Wears binder to assist with support.     Had first  BM yesterday - was hard and compacted and all rest of BM was not.    Remain on full liquids for 2 weeks - 5/10/23  Pt states is taking in the following: Ensure Max - Geletein Jello - yogurt, crm of wheat and water.    Recommended start vitamins one by one - start with vitamin D, then B12, then calcium chews, then MVI.    Pt to call with additional concerns.  Patient verbalized understanding and is agreeable to plan.    Kimberly Mahan, MS, RD, RN

## 2023-05-10 ENCOUNTER — TELEPHONE (OUTPATIENT)
Dept: SURGERY | Facility: CLINIC | Age: 53
End: 2023-05-10

## 2023-05-10 NOTE — TELEPHONE ENCOUNTER
Called pt to check why cancelled appt with RN and diet today.  Pt stated that clinic cx'd.  Discussed with pt that last conversation we had 5/3/23 that pt would let clinic know what day worked for pt - either 9th of 10th.  Those appts were held for pt who didn't inform clinic which worked.    Will work with pt to get her in to see nurse for visit.  RD can be virtual.  Pt to contact clinic when able to work out with family.  Kimberly Mahan, MS, RD, RN

## 2023-05-12 ENCOUNTER — TELEPHONE (OUTPATIENT)
Dept: SURGERY | Facility: CLINIC | Age: 53
End: 2023-05-12
Payer: COMMERCIAL

## 2023-05-12 NOTE — TELEPHONE ENCOUNTER
"Patient returned called.  Stated that was told this writer was going to call her back.  Was upset that was told to be seen today.  This writer didn't remember that was told but was not knowing pt was staying with relatives 3.5 hours away.  Was not able to inform pt of this as she had another agenda.     It is difficult to call this pt as she stated in her  msg that her  mailbox is left full. \"my voicemail is permanently disabled due to my career. I don't take voicemail messages at all.\"  Was not able to let pt know this as she wanted to discuss why she was not seen 5/1/23.  I did inform pt that I had sent  msg after her call today.  She did not acknowledge that she heard me say this.    Pt was informed that pt was still in hospital May 1st and that is why appointment was cancelled.  Wanted to know why Dr. Martinez didn't see her.  Informed pt that Tika, his PA saw her in the hospital and was informed to let pt know to be seen 5/4/23.  Pt stated this didn't occur.  On pt's AVS stated to \"Follow-up tomorrow in the weight loss clinic as scheduled. Call the office at [phone] with any questions.\"    It was difficult to steer pt away from 5/1/23 - discussed hydration status briefly.  Pt stated mouth was really dry and dark urine.    Informed pt that if she is far away and not within a hospital or clinic within  system that would recommend going to an emergency room for IVF's.  Pt stated she was in [facility].    Pt asked if our clinic would provide transportation to an appointment in [facility].   Informed pt that this was not done.  Pt was requested to be seen in clinic.  Has been out of hospital for 2 weeks and had been requesting pt be seen during that time.  Pt asked what other pts do that are out state.  Informed pt that they get a hotel as they know ahead of time that they have appointment. Ex: of [place] was given.  Pt didn't seem to appreciate this comment.    Pt stated that she was a person of " "color (black as stated by pt) and didn't have financial means as someone in [place].  Informed pt that we have no idea of a pt's financial situation - just that they made it here.  Informed pt that coming back home to her place of residence in [facility] that she had would not require her to stay in a hotel.     This writer was not able to discuss much re: pt's dehydration.  She wanted to discuss her appointments and why she wasn't seen on 5/1/23.  Pt wanted to call back at later date when felt would be heard.    Was not able to discuss the following with pt:     Pt had appt 5/1/23 but was cancelled due to still in hospital as discussed above.    Pt had appt 5/4 but was cancelled by [facility] - suspect due to pt was not in town.     Pt was sent  msg 5/2/23 and requested to see provider 5/9/23 at 1430.  Pt stated in  msg: \"I can do any visit virtually at any time and will work with family to see if I can get an answer for the ninth at three ASAP. If not, I can definitely do it virtually.\"  Pt was sent  msg thanking her for working with family to get in person appt done.     5/3/23 this writer called pt for std PO call.  Pt requested pain meds as oxy was not well-tolerated by her.  Tramadol prescribed to [facility] per pt request.  Called [facility] spoke with Catrachito Irizarry who stated pt didn't  Tramadol.   Not sure what Tramadol pt was taking when pain was discussed 5/8/23.  Per discussion with pt will  when receives confirmation which day works.     5/8/23 called with concern of pain. Appt not addressed.    5/10/23  Pt cancelled appt for 5/10/23 via  msg from [place] (I was told this visit was cancelled and attempted to be scheduled for the 9th. I am available virtually if that works.)  Pt did not let clinic know which appt would work as she stated would on 5/2/23.    5/12/23 sent pt  msg requesting pt let clinic know what time worked for her re: appts the next week. Pt read this " msg 1152 5/12/23.    5/15/23 pt called and wanted to discuss hydration.  Was unable to take phone call and requested pt be seen today.  WAS NOT AWARE pt was 3.5 hours away.   Sent pt  ms after couldn't get through to pt.   Pt called clinic due didn't feel we had tried calling her when we had as noted above.  Kimberly Mahan, MS, RD, RN

## 2023-05-12 NOTE — TELEPHONE ENCOUNTER
Called pt to get scheduled for PO visit with RN in person and diet virtual.  Unable to LM due to mailbox full.  Sent  msg in regards to RN availability.  Kimberly Mahan, MS, RD, RN

## 2023-05-23 NOTE — PROGRESS NOTES
"Virtual Visit Details    Type of service:  Video Visit   Video Start Time: 10:59  Video End Time:10:31    Originating Location (pt. Location): Home  Distant Location (provider location):  Off-site  Platform used for Video Visit: [facility]     BARIATRIC PROGRESS NOTE - 4 Week Post Op  DATE OF VISIT: May 23, 2023    Tiffani Brasher  1970  female  [id number]  52 year old    ASSESSMENT:    REASON FOR VISIT:  Tiffani Brasher is a 52 year old year old female presents today for a 4 Week Post Op at ~ 5 weeks Post Op nutrition follow-up appointment. Patient is accompanied by self      DIAGNOSIS:  Status: post gastric sleeve surgery.   Obesity Grade III BMI >40    ANTHROPOMETRICS:  Height: 69.5 lb   Initial weight: 319 lb  Current Weight: 278 lb    BMI: 41.05 kg/(m^2).    VITAMINS AND MINERALS:    [liquid] Multivitamin with Minerals (product does not meet bariatric guidelines) - dailyMultivitamin with Minerals  Not getting periods        NUTRITION HISTORY:  Tolerating diet: Bariatric pureed -full liquid diet  Fluids/water intake: 40-48 ounces water or enhanced water/day, protein drink, tomato juice  Small bites: Yes  Portion size: 1/2 cup or less  20-30 minute meals: Yes  Fluids and meals  by 30 minutes: Yes  Chew foods thoroughly: Yes  Breakfast: 2 scrambled eggs or 1/4 cup Lactose free cottage cheese  Lunch: skips of Gelatine (high protein jello) or tomato juice or unsweetened applesauce  Dinner: Ensure Max or 1/4-1/2 cup minestrone or lentil soup pureed  Snacks: denies  Nausea: daily  Vomiting: 3 X per week   Constipation: denies  Additional Information: Patient cancelled 2 week post-op RD visit. Patient went to stay with her mom after surgery due to needing help, but her mom had a \"brain bleed\" and was in the ICU for 1 week.  Patient report she stayed on full liquid diet for 2 weeks, then went to some pureed foods for ~ 2 weeks. Struggling with nausea daily and vomiting 3 times per week. Vomited on Ensure Max " today. Not taking Zofran for nausea, but will discuss with RN tomorrow. Tried tuna tartare and tolerated it.       PHYSICAL ACTIVITY:  Type: none      DIAGNOSIS:   *Previous Nutrition Diagnosis: n/a- 2 week PO visit was cancelled    Previous Goals:  N/a- 2 week PO visit cancelled    Current Nutrition Diagnosis: Altered gastrointestinal function related to alternation in gastrointestinal structure as evidenced by history of gastric sleeve.     INTERVENTION  Nutrition Prescription: Recommended bariatric soft diet. Staff message sent to provider requesting that vitamins be prescribed.    Goals:  Log if vomiting and what ate or drank before episode  Start soft diet at day 28 post op.  Start prescribed multivitamin/ mineral ,calcium with vitamin D, vitamin B12, vitamin D, and thiamin  Aim for 60 to 90 grams protein.  Start 48 to 64 oz of fluid per day.    Implementation:  Discussed transition to soft diet.  Emphasized importance of adequate protein.  Reviewed required vitamins and mineral supplements.  Verbalizes fair understanding of surgery diet guidelines.  Assessed learning needs and learning preferences.      NUTRITION MONITORING AND EVALUATION:   Monitor diet tolerance and weight loss.      Anticipated Compliance: fair  Follow Up: Continue to monitor patient closely regarding weight loss and diet.  1 month and  At 3 months Post Op with RD and provider    TIME SPENT WITH PATIENT: 32 minutes.  Scotty Martinez RD, LD  [facility], [place]

## 2023-05-30 ENCOUNTER — OFFICE VISIT (OUTPATIENT)
Dept: NEUROLOGY | Facility: CLINIC | Age: 53
End: 2023-05-30
Payer: COMMERCIAL

## 2023-05-30 ENCOUNTER — TELEPHONE (OUTPATIENT)
Dept: SURGERY | Facility: CLINIC | Age: 53
End: 2023-05-30

## 2023-05-30 ENCOUNTER — VIRTUAL VISIT (OUTPATIENT)
Dept: SURGERY | Facility: CLINIC | Age: 53
End: 2023-05-30
Payer: COMMERCIAL

## 2023-05-30 VITALS — BODY MASS INDEX: 41.05 KG/M2 | WEIGHT: 278 LBS

## 2023-05-30 DIAGNOSIS — R20.2 PARESTHESIA OF RIGHT LOWER EXTREMITY: ICD-10-CM

## 2023-05-30 DIAGNOSIS — Z98.84 BARIATRIC SURGERY STATUS: Primary | ICD-10-CM

## 2023-05-30 DIAGNOSIS — E66.01 MORBID OBESITY WITH BMI OF 45.0-49.9, ADULT (H): ICD-10-CM

## 2023-05-30 PROCEDURE — 95908 NRV CNDJ TST 3-4 STUDIES: CPT | Performed by: PSYCHIATRY & NEUROLOGY

## 2023-05-30 PROCEDURE — 95886 MUSC TEST DONE W/N TEST COMP: CPT | Performed by: PSYCHIATRY & NEUROLOGY

## 2023-05-30 PROCEDURE — 97803 MED NUTRITION INDIV SUBSEQ: CPT | Mod: VID

## 2023-05-30 RX ORDER — LANOLIN ALCOHOL/MO/W.PET/CERES
100 CREAM (GRAM) TOPICAL DAILY
Qty: 30 TABLET | Refills: 0 | Status: CANCELLED | OUTPATIENT
Start: 2023-05-30

## 2023-05-30 RX ORDER — MENTHOL 5.8 MG/1
2 LOZENGE ORAL DAILY
Qty: 180 TABLET | Refills: 3 | Status: CANCELLED | OUTPATIENT
Start: 2023-05-30

## 2023-05-30 RX ORDER — MENTHOL 5.8 MG/1
2 LOZENGE ORAL DAILY
Qty: 180 TABLET | Refills: 3 | Status: SHIPPED | OUTPATIENT
Start: 2023-05-30 | End: 2023-07-20

## 2023-05-30 RX ORDER — CALCIUM CARBONATE/VITAMIN D3 600 MG-10
1 TABLET ORAL 2 TIMES DAILY
Qty: 180 TABLET | Refills: 3 | Status: CANCELLED | OUTPATIENT
Start: 2023-05-30

## 2023-05-30 RX ORDER — CALCIUM CARBONATE/VITAMIN D3 500 MG-10
1 TABLET ORAL 3 TIMES DAILY
Qty: 270 TABLET | Refills: 3 | Status: SHIPPED | OUTPATIENT
Start: 2023-05-30 | End: 2023-07-20

## 2023-05-30 NOTE — LETTER
2023       RE: Tiffani Brasher  [address]  [facility] [address]       Dear Colleague,    Thank you for referring your patient, Tiffani Barsher, to the [facility] [place] [facility]. Please see a copy of my visit note below.                        [facility]  Electrodiagnostic Laboratory                 Department of Neurology                                                                                                         Test Date:  2023    Patient: Tiffani Brasher : 1970 Physician: Tristin Fong MD   Sex: Female AGE: 52 year Ref Phys: Arden Dee, DO   ID#: [id number]   Technician: Rey Salazar     History and Examination:    52-year-old woman with chief complaint of pain at the right groin, with paresthesias at the right medial thigh, and medial leg below the knee, towards the ankle.  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    Results:    Right fibular (EDB) and tibial (AH) motor nerve conduction studies were normal.  Right superficial fibular and sural antidromic sensory nerve conduction studies were normal.  Needle EMG of the right TA, vastus lateralis, adductor longus, iliopsoas, and vastus medialis, were normal.  Needle EMG of the right L4 paraspinals showed increased insertional activity and a possible myotonic discharge.    Interpretation:    Abnormal but not diagnostic study.  There is increased insertional activity with a possible myotonic discharge at the right upper lumbar paraspinal muscles (L3-L4).  This finding may be seen with right lumbar radiculopathy at those levels, but it is not diagnostic in isolation.  There is no electrodiagnostic evidence of right femoral mononeuropathy or lumbar plexopathy based on this study.  Clinical correlation is required.      ___________________________  Tristin Fong  MD        Nerve Conduction Studies  Motor Sites      Latency Amplitude Neg. Amp Diff Segment Distance Velocity Neg. Dur Neg Area Diff Temperature Comment   Site (ms) Norm (mV) Norm %  cm m/s Norm ms %  C    Right Fibular (EDB) Motor   Ankle 2.6  < 6.0 2.6  > 2.5  Ankle-EDB 8   4.6  31.5    Bel Fib Head 10.3 - 1.84 - -29.2 Bel Fib Head-Ankle 36.5 47  > 38 5.1 -29.6 31.4    Pop Fossa 12.0 - 1.74 - -5.4 Pop Fossa-Bel Fib Head 10 59  > 38 5.5 -2.0 31.3    Right Tibial (AHB) Motor   Ankle 4.7  < 6.5 9.5  > 5.0  Ankle-AHB 8   3.5  31.3    Knee 12.3 - 8.7 - -8.4 Knee-Ankle 40 53  > 38 5.4 23.0 31.3      Sensory Sites      Onset Lat Peak Lat Amp (O-P) Amp (P-P) Segment Distance Velocity Temperature Comment   Site ms ms  V Norm  V  cm m/s Norm  C    Right Superficial Fibular Sensory   14 cm-Ankle 2.5 3.1 8  > 3 8 14 cm-Ankle 12.5 50  > 38 32.9    Right Sural Sensory   Calf-Lat Mall 2.9 3.6 13  > 5 13 Calf-Lat Mall 14 48  > 38 30.8        Electromyography     Side Muscle Ins Act Fibs/PSW Fasc HF Amp Dur Poly Recrt Int Pat   Right Tib Anterior Nml None Nml 0 Nml Nml 0 Nml Nml   Right Vastus Lat Nml None Nml 0 Nml Nml 0 Nml Nml   Right Add Longus Nml None Nml 0 Nml Nml 0 Nml Nml   Right Iliopsoas Nml None Nml 0 Nml Nml 0 Nml Nml   Right Vastus Med Nml None Nml 0 Nml Nml 0 Nml Nml   Right L4 Parasp Incr None Nml Myot              NCS Waveforms:    Motor         Sensory           Ultrasound Images:            Again, thank you for allowing me to participate in the care of your patient.      Sincerely,    Tristin Fong MD

## 2023-05-30 NOTE — TELEPHONE ENCOUNTER
Pended vitamins for provider, will route for review.    Bettye Rivera RN on 5/30/2023 at 1:02 PM

## 2023-05-30 NOTE — PROGRESS NOTES
[facility]  Electrodiagnostic Laboratory                 Department of Neurology                                                                                                         Test Date:  2023    Patient: Tiffani Brasher : 1970 Physician: Tristin Fong MD   Sex: Female AGE: 52 year Ref Phys: Arden Dee    ID#: [id number]   Technician: Rey Salazar     History and Examination:    52-year-old woman with chief complaint of pain at the right groin, with paresthesias at the right medial thigh, and medial leg below the knee, towards the ankle.  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    Results:    Right fibular (EDB) and tibial (AH) motor nerve conduction studies were normal.  Right superficial fibular and sural antidromic sensory nerve conduction studies were normal.  Needle EMG of the right TA, vastus lateralis, adductor longus, iliopsoas, and vastus medialis, were normal.  Needle EMG of the right L4 paraspinals showed increased insertional activity and a possible myotonic discharge.    Interpretation:    Abnormal but not diagnostic study.  There is increased insertional activity with a possible myotonic discharge at the right upper lumbar paraspinal muscles (L3-L4).  This finding may be seen with right lumbar radiculopathy at those levels, but it is not diagnostic in isolation.  There is no electrodiagnostic evidence of right femoral mononeuropathy or lumbar plexopathy based on this study.  Clinical correlation is required.      ___________________________  Tristin Fong MD        Nerve Conduction Studies  Motor Sites      Latency Amplitude Neg. Amp Diff Segment Distance Velocity Neg. Dur Neg Area Diff Temperature Comment   Site (ms) Norm (mV) Norm %  cm m/s Norm ms %  C    Right Fibular (EDB) Motor   Ankle 2.6  < 6.0 2.6  > 2.5  Ankle-EDB 8   4.6  31.5    Bel Fib Head 10.3 - 1.84 - -29.2 Bel  Fib Head-Ankle 36.5 47  > 38 5.1 -29.6 31.4    Pop Fossa 12.0 - 1.74 - -5.4 Pop Fossa-Bel Fib Head 10 59  > 38 5.5 -2.0 31.3    Right Tibial (AHB) Motor   Ankle 4.7  < 6.5 9.5  > 5.0  Ankle-AHB 8   3.5  31.3    Knee 12.3 - 8.7 - -8.4 Knee-Ankle 40 53  > 38 5.4 23.0 31.3      Sensory Sites      Onset Lat Peak Lat Amp (O-P) Amp (P-P) Segment Distance Velocity Temperature Comment   Site ms ms  V Norm  V  cm m/s Norm  C    Right Superficial Fibular Sensory   14 cm-Ankle 2.5 3.1 8  > 3 8 14 cm-Ankle 12.5 50  > 38 32.9    Right Sural Sensory   Calf-Lat Mall 2.9 3.6 13  > 5 13 Calf-Lat Mall 14 48  > 38 30.8        Electromyography     Side Muscle Ins Act Fibs/PSW Fasc HF Amp Dur Poly Recrt Int Pat   Right Tib Anterior Nml None Nml 0 Nml Nml 0 Nml Nml   Right Vastus Lat Nml None Nml 0 Nml Nml 0 Nml Nml   Right Add Longus Nml None Nml 0 Nml Nml 0 Nml Nml   Right Iliopsoas Nml None Nml 0 Nml Nml 0 Nml Nml   Right Vastus Med Nml None Nml 0 Nml Nml 0 Nml Nml   Right L4 Parasp Incr None Nml Myot              NCS Waveforms:    Motor         Sensory           Ultrasound Images:

## 2023-05-31 ENCOUNTER — OFFICE VISIT (OUTPATIENT)
Dept: FAMILY MEDICINE | Facility: CLINIC | Age: 53
End: 2023-05-31
Payer: COMMERCIAL

## 2023-05-31 ENCOUNTER — OFFICE VISIT (OUTPATIENT)
Dept: SURGERY | Facility: CLINIC | Age: 53
End: 2023-05-31
Payer: COMMERCIAL

## 2023-05-31 VITALS
HEIGHT: 69 IN | SYSTOLIC BLOOD PRESSURE: 117 MMHG | HEART RATE: 77 BPM | BODY MASS INDEX: 41.04 KG/M2 | OXYGEN SATURATION: 98 % | DIASTOLIC BLOOD PRESSURE: 84 MMHG | WEIGHT: 277.1 LBS

## 2023-05-31 VITALS
BODY MASS INDEX: 40.82 KG/M2 | WEIGHT: 276.4 LBS | DIASTOLIC BLOOD PRESSURE: 86 MMHG | RESPIRATION RATE: 20 BRPM | HEART RATE: 74 BPM | OXYGEN SATURATION: 99 % | SYSTOLIC BLOOD PRESSURE: 119 MMHG

## 2023-05-31 DIAGNOSIS — Z98.84 BARIATRIC SURGERY STATUS: ICD-10-CM

## 2023-05-31 DIAGNOSIS — E66.01 MORBID OBESITY WITH BMI OF 45.0-49.9, ADULT (H): Primary | ICD-10-CM

## 2023-05-31 DIAGNOSIS — Z90.3 H/O GASTRIC SLEEVE: Primary | ICD-10-CM

## 2023-05-31 DIAGNOSIS — E66.01 MORBID OBESITY WITH BMI OF 40.0-44.9, ADULT (H): ICD-10-CM

## 2023-05-31 DIAGNOSIS — F43.10 PTSD (POST-TRAUMATIC STRESS DISORDER): ICD-10-CM

## 2023-05-31 DIAGNOSIS — Z12.4 CERVICAL CANCER SCREENING: ICD-10-CM

## 2023-05-31 DIAGNOSIS — F41.1 GAD (GENERALIZED ANXIETY DISORDER): ICD-10-CM

## 2023-05-31 DIAGNOSIS — K91.2 POSTSURGICAL MALABSORPTION: ICD-10-CM

## 2023-05-31 LAB
ALBUMIN SERPL BCG-MCNC: 4 G/DL (ref 3.5–5.2)
ALP SERPL-CCNC: 76 U/L (ref 35–104)
ALT SERPL W P-5'-P-CCNC: 9 U/L (ref 10–35)
ANION GAP SERPL CALCULATED.3IONS-SCNC: 14 MMOL/L (ref 7–15)
AST SERPL W P-5'-P-CCNC: 20 U/L (ref 10–35)
BILIRUB SERPL-MCNC: 0.3 MG/DL
BUN SERPL-MCNC: 8.8 MG/DL (ref 6–20)
CALCIUM SERPL-MCNC: 9.9 MG/DL (ref 8.6–10)
CHLORIDE SERPL-SCNC: 103 MMOL/L (ref 98–107)
CREAT SERPL-MCNC: 0.68 MG/DL (ref 0.51–0.95)
DEPRECATED HCO3 PLAS-SCNC: 24 MMOL/L (ref 22–29)
ERYTHROCYTE [DISTWIDTH] IN BLOOD BY AUTOMATED COUNT: 15.6 % (ref 10–15)
FERRITIN SERPL-MCNC: 110 NG/ML (ref 11–328)
GFR SERPL CREATININE-BSD FRML MDRD: >90 ML/MIN/1.73M2
GLUCOSE SERPL-MCNC: 89 MG/DL (ref 70–99)
HCT VFR BLD AUTO: 41.5 % (ref 35–47)
HGB BLD-MCNC: 12.8 G/DL (ref 11.7–15.7)
MCH RBC QN AUTO: 30.5 PG (ref 26.5–33)
MCHC RBC AUTO-ENTMCNC: 30.8 G/DL (ref 31.5–36.5)
MCV RBC AUTO: 99 FL (ref 78–100)
PLATELET # BLD AUTO: 273 10E3/UL (ref 150–450)
POTASSIUM SERPL-SCNC: 4.2 MMOL/L (ref 3.4–5.3)
PROT SERPL-MCNC: 7.3 G/DL (ref 6.4–8.3)
RBC # BLD AUTO: 4.19 10E6/UL (ref 3.8–5.2)
SODIUM SERPL-SCNC: 141 MMOL/L (ref 136–145)
VIT B12 SERPL-MCNC: 322 PG/ML (ref 232–1245)
WBC # BLD AUTO: 6.5 10E3/UL (ref 4–11)

## 2023-05-31 PROCEDURE — 99214 OFFICE O/P EST MOD 30 MIN: CPT | Performed by: INTERNAL MEDICINE

## 2023-05-31 PROCEDURE — 36415 COLL VENOUS BLD VENIPUNCTURE: CPT | Performed by: INTERNAL MEDICINE

## 2023-05-31 PROCEDURE — 82306 VITAMIN D 25 HYDROXY: CPT | Performed by: INTERNAL MEDICINE

## 2023-05-31 PROCEDURE — 80053 COMPREHEN METABOLIC PANEL: CPT | Performed by: INTERNAL MEDICINE

## 2023-05-31 PROCEDURE — 85027 COMPLETE CBC AUTOMATED: CPT | Performed by: INTERNAL MEDICINE

## 2023-05-31 PROCEDURE — 82607 VITAMIN B-12: CPT | Performed by: INTERNAL MEDICINE

## 2023-05-31 PROCEDURE — 82728 ASSAY OF FERRITIN: CPT | Performed by: INTERNAL MEDICINE

## 2023-05-31 RX ORDER — ALPRAZOLAM 0.25 MG
0.25 TABLET ORAL DAILY PRN
Qty: 15 TABLET | Refills: 0 | Status: SHIPPED | OUTPATIENT
Start: 2023-05-31 | End: 2023-12-14

## 2023-05-31 RX ORDER — ONDANSETRON 4 MG/1
4 TABLET, ORALLY DISINTEGRATING ORAL EVERY 6 HOURS PRN
Qty: 30 TABLET | Refills: 0 | Status: SHIPPED | OUTPATIENT
Start: 2023-05-31 | End: 2023-12-12

## 2023-05-31 ASSESSMENT — PAIN SCALES - GENERAL: PAINLEVEL: EXTREME PAIN (8)

## 2023-05-31 NOTE — PROGRESS NOTES
[facility] Weight Loss Clinic  5 Week Surgical Follow-Up     HISTORY OF PRESENT ILLNESS:  The patient returns today for 5 week follow-up appointment status post gastric sleeve  The patient is accompanied by self. The patient is drinking 40+ oz of fluid daily, including H2O, ensure Max (although did cause some nausea), tomato juice, cottage cheese, and yogurt.    Although pt is now 5 weeks PO, she reports she has been hesitant to progress to more solid foods d/t nausea/vomiting.  She does not feel the same hunger cues as she did before, we discussed that it is extremely important to be intentional about eating 3 meals/day focusing on getting in protein even if not feeling true hunger.   Also discussed how dehydration can tend to make nausea worse.  Reports emesis episodes 3x/week while trying to have an ensure and tuna. Requested a refill of Zofran, message to provider sent.   Patient did state the anticipation of feeling nauseated/vomit does make her hesitant to try to eat more, states she gets very anxious.  Of note, also stated that she has been in the hospital with her mother and this has been increasing her stress levels.    Pain:    The patient is currently using None for pain medication. The patient rates pain at a 2/10 on the pain scale at rest. The pain is aggravated with twisting/bending movements. The pain is located in the right sided abdomen near her largest incision.      Activity:  The patient is considered a fall risk:  No. Interventions to secure the patient's safety are in place.  What are you doing for physical activity?  Walking. Pt is interested in starting yoga and pyometrics once she is getting in more food/fluids consistently   How many days per week? Walking inside/outsie daily  How many minutes per day?  10-15  Review of Systems:  GI:    Nausea occurs weekly.  It is alleviated by Zofran, but ran out. Requesting a refill. Also had compazine rx'd at discharge from hospital, decided not to take  d/t risk of sleepiness.          Vomiting occurs weekly.  Patient thinks it was caused by nausea, per pt, seems to happen after patient tries to force herself to eat when she is not hungry. Does NOT have hunger cues at this time, per pt to every meal is voluntary.   GERD occurs rarely. Patient is currently taking omeprazole 20mg daily. She does report some frothing at the back of her throat associated with the nausea/vomiting.          Diarrhea occurs occasionally.  Patient's last bowel movement was today, with the color noted as brown, felt loose to pt, but she recognized that this is because she is not eating much solid food.            Constipation occurs never.  Patient's last bowel movement was today. Patient is passing gas.             CV/Pulmonary:   Dizziness occurs rarely. Occurs when standing too fast, discussed pushing fluids.     Shortness of Breath occurs never.  Chest pain occurs never.    Vascular:    Leg swelling none.     Viridiana's Negative     :  Form of birth control is other hysterectomy     PHYSICAL EXAMINATION:    VITALS:  See Epic for VS.  LUNGS:  clear to auscultation  HEART:  Apical pulse strong, regular.  ABDOMEN:  Abdomen soft, non-tender. BS normal.   INCISIONS:  dry and intact.  Steristrips removed.  Adhesive remover given to patient.    MEDICATIONS/ALLERGIES REVIEWED:  Yes    ASSESSMENT:    1.  2 weeks status post gastric sleeve surgery.    PLAN:    Increase activity per physical therapist recommendations today.   Make follow up appointment to meet with psychologist and 1 month post operatively.   Follow up with your primary care provider to obesity related conditions by one month post op.   Advance diet per Dietitian.  Start recommended postoperative vitamins within in the first 6 weeks per Dietitian  Strive to drink 64 oz of fluid daily, will reach out to patient next week to check on PO intake and nausea.  Wear binder to support abdominal muscles and gradually wean off over the next  few weeks.   Continue to do deep breathing exercises twice daily   Return to clinic postop month 3, appointments for these made today.  Call with questions or concerns at any time.  Will continue to follow-up peripherally re: fluid/food intake. Encouraged pt to message the dietician and/or nursing team at any time.    INTERVENTIONS:      Symptoms given re: signs and symptoms of infection and when to call clinic. Patient verbalized understanding.    Patient exercise/activity restrictions reviewed; exercise handout given.  Exercise plan postop is slowly begin to increase activity once getting in more fluids/calories.  Reviewed when patient can return to bathing and swimming.  Reviewed signs/symptoms of DVT with patient.  Patient return to work date has already passed.  Reviewed FMLA.  No further forms needed at this time.    No further needs or questions at this time.  Patient agrees to call clinic with any questions or concerns prior to next appointment.    Bettye Rivera RN

## 2023-05-31 NOTE — PROGRESS NOTES
Assessment & Plan   Problem List Items Addressed This Visit        Digestive    Morbid obesity with BMI of 40.0-44.9, adult (H)       Behavioral    JAXON (generalized anxiety disorder)    Relevant Medications    ALPRAZolam (XANAX) 0.25 MG tablet    PTSD (post-traumatic stress disorder)    Relevant Medications    ALPRAZolam (XANAX) 0.25 MG tablet   Other Visit Diagnoses     H/O gastric sleeve    -  Primary    Relevant Orders    CBC with platelets (Completed)    Ferritin (Completed)    Comprehensive metabolic panel (BMP + Alb, Alk Phos, ALT, AST, Total. Bili, TP) (Completed)    Vitamin B12 (Completed)    Vitamin D Deficiency (Completed)    Cervical cancer screening               Discussed bariatric multivitamins postop.  Refill given for Xanax use is minimal.  Keep Adequate hydration.  Continue follow-up with dietitian and bariatric surgeon.  --as far as the advancing food now she is in soft diet stage.    No complication postop except for nausea, she takes Zofran as needed, she has not been using oxycodone much ,she has 3 left.  Advised to avoid using.    Hemodynamics are stable.  Congratulated patient on the surgery success since she has lost down 30 pounds, she continues to work with the dietitian.  Hemodynamics are stable, no concerns, she would like some labs done.  We will check iron level B12 vitamin D and CMP.  She had an EMG test that showed evidence of L3-L4 radiculopathy type of which would not explain the pain from the groin down to the right leg anterior aspect possible referred pain, she is not on gabapentin at this time, is a consideration ,may be weight loss will help with the pain ,clinical correlation with imaging is indicated if symptoms persist ,patient to follow-up with neurology.  Consideration for gabapentin but I would avoid adding medications now..  Occasional slight dizziness could be hydration related, she drinks 48 ounces per day goal is at least 60 ounces per day.  All questions answered  "patient discharged from clinic in stable condition.         BMI:   Estimated body mass index is 40.92 kg/m  as calculated from the following:    Height as of this encounter: 1.753 m (5' 9\").    Weight as of this encounter: 125.7 kg (277 lb 1.6 oz).           Megan Phillips MD  [facility] ELA Nixon is a 52 year old, presenting for the following health issues:  Follow Up    History of Present Illness       Reason for visit:  Surgical follow up    She eats 0-1 servings of fruits and vegetables daily.She consumes 0 sweetened beverage(s) daily.She exercises with enough effort to increase her heart rate 10 to 19 minutes per day.  She exercises with enough effort to increase her heart rate 3 or less days per week. She is missing 1 dose(s) of medications per week.       1 Month post op, sleeve gastrectomy  Had fall in Hospital, was weak,    Yesterday had EMG for right leg pain,  L3-L4, RADICULOPATHY  Pain in groin down to foot ,in front, starts in groin ,?.. sits wrong way  bend wrong way..    Oxycodone , reports make her feel like \"Zombee..\" on medication    Takes propranolol , omeprazole and sertraline, has not taken statin  Since surgery,   Out of alprazolam , has not needed it    15 min walking a day, some lightheadedness, probable hydration    Review of Systems   Constitutional, HEENT, cardiovascular, pulmonary, gi and gu systems are negative, except as otherwise noted.      Objective    /84 (BP Location: Left arm, Patient Position: Sitting, Cuff Size: Adult Large)   Pulse 77   Ht 1.753 m (5' 9\")   Wt 125.7 kg (277 lb 1.6 oz)   LMP 04/04/2019   SpO2 98%   Breastfeeding No   BMI 40.92 kg/m    Body mass index is 40.92 kg/m .  Physical Exam   GENERAL: healthy, alert and no distress, VERY PLEASANT  EYES: Eyes grossly normal to inspection, PERRL and conjunctivae and sclerae normal  NECK: no adenopathy, no asymmetry, masses, or scars and thyroid normal to palpation  RESP: " lungs clear to auscultation - no rales, rhonchi or wheezes  CV: regular rate and rhythm, normal S1 S2, no S3 or S4, no murmur, click or rub, no peripheral edema and peripheral pulses strong  ABDOMEN: soft, nontender, no hepatosplenomegaly, no masses and bowel sounds normal  MS: no gross musculoskeletal defects noted, no edema  SKIN: no suspicious lesions or rashes  NEURO: Normal strength and tone, mentation intact and speech normal  PSYCH: mentation appears normal, affect normal/bright    Admission on 04/26/2023, Discharged on 05/01/2023   Component Date Value Ref Range Status     Glucose 04/26/2023 88  70 - 99 mg/dL Final     Potassium 04/26/2023 3.8  3.4 - 5.3 mmol/L Final     Case Report 04/26/2023    Final                    Value:Surgical Pathology Report                         Case: [id number]                                  Authorizing Provider:  Al Martinez MD         Collected:           04/26/2023 09:35 AM          Ordering Location:     [facility]          Received:            04/26/2023 10:27 AM                                 [facility] Main OR                                                            Pathologist:           Shahid Cabrera MD                                                           Specimen:    Stomach                                                                                     Final Diagnosis 04/26/2023    Final                    Value:This result contains rich text formatting which cannot be displayed here.     Clinical Information 04/26/2023    Final                    Value:This result contains rich text formatting which cannot be displayed here.     Gross Description 04/26/2023    Final                    Value:This result contains rich text formatting which cannot be displayed here.     Microscopic Description 04/26/2023    Final                    Value:This result contains rich text formatting which cannot be displayed here.     Performing Labs 04/26/2023     Final                    Value:This result contains rich text formatting which cannot be displayed here.     Creatinine 04/26/2023 0.80  0.51 - 0.95 mg/dL Final     GFR Estimate 04/26/2023 88  >60 mL/min/1.73m2 Final    eGFR calculated using 2021 CKD-EPI equation.     Hemoglobin 04/27/2023 12.7  11.7 - 15.7 g/dL Final     Sodium 04/27/2023 137  136 - 145 mmol/L Final     Potassium 04/27/2023 3.5  3.4 - 5.3 mmol/L Final     Chloride 04/27/2023 99  98 - 107 mmol/L Final     Carbon Dioxide (CO2) 04/27/2023 27  22 - 29 mmol/L Final     Anion Gap 04/27/2023 11  7 - 15 mmol/L Final     GLUCOSE BY METER POCT 04/27/2023 93  70 - 99 mg/dL Final     GLUCOSE BY METER POCT 04/28/2023 143 (H)  70 - 99 mg/dL Final     Platelet Count 04/29/2023 206  150 - 450 10e3/uL Final     GLUCOSE BY METER POCT 04/29/2023 97  70 - 99 mg/dL Final     Sodium 04/30/2023 139  136 - 145 mmol/L Final     Potassium 04/30/2023 4.1  3.4 - 5.3 mmol/L Final     Chloride 04/30/2023 104  98 - 107 mmol/L Final     Carbon Dioxide (CO2) 04/30/2023 23  22 - 29 mmol/L Final     Anion Gap 04/30/2023 12  7 - 15 mmol/L Final     Urea Nitrogen 04/30/2023 12.2  6.0 - 20.0 mg/dL Final     Creatinine 04/30/2023 0.62  0.51 - 0.95 mg/dL Final     Calcium 04/30/2023 9.0  8.6 - 10.0 mg/dL Final     Glucose 04/30/2023 98  70 - 99 mg/dL Final     GFR Estimate 04/30/2023 >90  >60 mL/min/1.73m2 Final    eGFR calculated using 2021 CKD-EPI equation.     Hemoglobin 04/30/2023 10.3 (L)  11.7 - 15.7 g/dL Final

## 2023-06-01 DIAGNOSIS — E55.9 VITAMIN D DEFICIENCY: Primary | ICD-10-CM

## 2023-06-01 LAB — DEPRECATED CALCIDIOL+CALCIFEROL SERPL-MC: 16 UG/L (ref 20–75)

## 2023-06-09 ENCOUNTER — TELEPHONE (OUTPATIENT)
Dept: FAMILY MEDICINE | Facility: CLINIC | Age: 53
End: 2023-06-09
Payer: COMMERCIAL

## 2023-06-09 NOTE — TELEPHONE ENCOUNTER
Unread MyChart result forwarded to triage basket:     Ervin Nixon, vitamin D level is very low at 16.  Please you need to take vitamin D3 50,000 units once a week for 12 weeks.     I will send a prescription to pharmacy record.     Regards,  Dr. Phillips   Written by Megan Phillips MD on 6/1/2023  4:39 PM CDT    Patient Contact    Attempt # 1    Was call answered?  No.  VM full     Tika KRAMER, Triage RN  [facility]

## 2023-06-13 NOTE — TELEPHONE ENCOUNTER
Called patient regariding PCP message below. Patient verbalized understanding.     Sarah Burnett RN on 6/13/2023 at 8:18 AM

## 2023-07-17 NOTE — PROGRESS NOTES
"Tiffani is a 52 year old who is being evaluated via a billable video visit.      The patient has been notified of following:     \"This video visit will be conducted via a call between you and your physician/provider. We have found that certain health care needs can be provided without the need for an in-person physical exam.  This service lets us provide the care you need with a video conversation.  If a prescription is necessary we can send it directly to your pharmacy.  If lab work is needed we can place an order for that and you can then stop by our lab to have the test done at a later time.    Video visits are billed at different rates depending on your insurance coverage.  Please reach out to your insurance provider with any questions.    If during the course of the call the physician/provider feels a video visit is not appropriate, you will not be charged for this service.\"    Patient has given verbal consent for Video visit? Yes    How would you like to obtain your AVS? MyChart    If the video visit is dropped, the invitation should be resent by: Text to cell phone: [phone]    Will anyone else be joining your video visit? No    I    Video-Visit Details    Type of service:  Video Visit    Video Start Time: 2:04 PM    Video End Time:2:43 pm    Originating Location (pt. Location): Home    Distant Location (provider location):  [facility] [place]     Platform used for Video Visit: [facility] Bariatric Surgery Note    BARIATRIC FOLLOW UP      7/20/2023    HISTORY OF PRESENT ILLNESS: Pt presents today for her follow-up appointment status post Gastric sleeve in 4/2023 with Dr. Martinez    Assessment & Plan   Problem List Items Addressed This Visit     Morbid obesity with BMI of 40.0-44.9, adult (H)        PATIENT INSTRUCTIONS:  Plan:  Labs ordered. Call [phone] to schedule.  Continue your bariatric vitamins - prescribed medications  Continue omeprazole and increase to " taking twice a day  Respond to [facility] message on current Vitamin D you're taking    FOLLOW-UP:  Call [phone] to schedule next visit in 3 months.     40 minutes spent on the date of the encounter doing chart review, history and exam, result review, counseling, developing plan of care, documentation, and further activities as noted      INTERVAL HX:   Having vomiting up to three times per week. Tends to be if eating and then laying down afterwards or eating too fast or the wrong item. Slowed down what she was eating and how quickly. Was getting foamies up until 1 week half ago and now has gone away in last week and a half. She feels her taste buds are off and is unsure how she is going to get up to 1/2 cup of food as recommended by Bridgett. Doesn't think she is getting her daily protein. Getting fluid intake for sure. Using protein mix. Tends to have a gnawing in her stomach and tends to improve w/eating.      WEIGHT METRICS:  Body mass index is 37.66 kg/m .   Current Weight: 255 lb (115.7 kg)                Wt Readings from Last 10 Encounters:   07/20/23 255 lb (115.7 kg)   05/31/23 277 lb 1.6 oz (125.7 kg)   05/31/23 276 lb 6.4 oz (125.4 kg)   05/30/23 278 lb (126.1 kg)   04/26/23 304 lb (137.9 kg)   04/24/23 307 lb 9.6 oz (139.5 kg)   02/16/23 313 lb (142 kg)   02/15/23 310 lb 6.4 oz (140.8 kg)   10/13/22 328 lb (148.8 kg)   07/28/22 312 lb (141.5 kg)        VITAMINS:  Reviewed vitamins -   Eladia Dickeyh liquid vitamin since surgery    No tingling/no persistent balance issues/skin changes/hair loss/diarrhea/constipation/etc.    EXERCISE:  Pt looking into restarting exercising.      OBESITY RELATED CONDITIONS:  Sleeping better   BP unknown if improving    EATING HABITS:  How many meals do you eat per day?  2-3 meals  Do you snack between meals?  No  Are you able to separate your meals and liquids by at least 30 minutes?  Yes  Are you able to avoid liquid calories? no    SOCIAL HISTORY:  Pt denies smoking.  Pt  denies alcohol use.  Avoids NSAIDS.      REVIEW OF SYSTEMS:     GI:  Nausea-  Yes  Vomiting-  Yes - three times a week or so  Diarrhea-  No  Constipation-  No  Dysphagia-  No  Abd. Pain-  Yes - occurs daily tends to improve after eating  Heartburn-  No     SKIN:  Intertriginous irritation- Yes - increased sweat/red/raw by the breast/itchy. Trying to keep it dry, clean, antiperspirant    PSYCH:  Depression-  No  Anxiety-  No    Seeing behavior therapist weekly - has been a dip and reduced socializing d/t so much related to food and alcohol. Results in feeling a bit isolated    Avoiding caffeine, alcohol, avoiding NSAIDs, avoiding tobacco smoke    LABS/IMAGING/MEDICAL RECORDS REVIEW:   Hemoglobin A1C   Date Value Ref Range Status   03/30/2021 5.4 0 - 5.6 % Final     Comment:     Normal <5.7% Prediabetes 5.7-6.4%  Diabetes 6.5% or higher - adopted from ADA   consensus guidelines.       Vitamin D, Total (25-Hydroxy)   Date Value Ref Range Status   05/31/2023 16 (L) 20 - 75 ug/L Final     Parathyroid Hormone Intact   Date Value Ref Range Status   10/13/2022 52 15 - 65 pg/mL Final     Vitamin B12   Date Value Ref Range Status   05/31/2023 322 232 - 1,245 pg/mL Final     Hemoglobin   Date Value Ref Range Status   05/31/2023 12.8 11.7 - 15.7 g/dL Final     Ferritin   Date Value Ref Range Status   05/31/2023 110 11 - 328 ng/mL Final     Iron   Date Value Ref Range Status   08/30/2021 52 35 - 180 ug/dL Final     Iron Binding Capacity   Date Value Ref Range Status   08/30/2021 327 240 - 430 ug/dL Final     Iron Saturation Index   Date Value Ref Range Status   03/30/2021 24 15 - 46 % Final   04/03/2019 4 (L) 15 - 46 % Final     Iron Sat Index   Date Value Ref Range Status   08/30/2021 16 15 - 46 % Final       PHYSICAL EXAMINATION:  Wt 255 lb (115.7 kg)   LMP 04/04/2019   BMI 37.66 kg/m      GENERAL: Healthy, alert and no distress  EYES: Eyes grossly normal to inspection.  No discharge or erythema, or obvious  scleral/conjunctival abnormalities.  RESP: No audible wheeze, cough, or visible cyanosis.  No visible retractions or increased work of breathing.    SKIN: Visible skin clear. No significant rash, abnormal pigmentation or lesions.  NEURO:  Mentation and speech appropriate for age.  PSYCH: Mentation appears normal, affect normal/bright, judgement and insight intact, normal speech and appearance well-groomed.    COUNSELING PROVIDED:  We reviewed the important post op bariatric recommendations:  eating 3 meals daily  eating protein first, getting >60gm protein daily  eating slowly, chewing food well  avoiding/limiting calorie containing beverages  avoiding fluids 30 minutes before, during, and after meals    Pt reminded to avoid marginal ulcers she should avoid tobacco at all, alcohol in excess, caffeine, and NSAIDS (unless indicated for cardioprotection or otherwise and opposed by a PPI).  Pt encouraged to establish and maintain a consistent physical activity routine, 6-8 hours of restorative sleep each night and optimal stress management.  Pt counseled on the importance of life long vitamin supplementation and life long follow up.    Information provided in AVS.    Tessa Almazan PA-C

## 2023-07-19 ENCOUNTER — TELEPHONE (OUTPATIENT)
Dept: SURGERY | Facility: CLINIC | Age: 53
End: 2023-07-19

## 2023-07-19 ENCOUNTER — VIRTUAL VISIT (OUTPATIENT)
Dept: SURGERY | Facility: CLINIC | Age: 53
End: 2023-07-19
Payer: COMMERCIAL

## 2023-07-19 DIAGNOSIS — E66.01 MORBID OBESITY WITH BMI OF 40.0-44.9, ADULT (H): Primary | ICD-10-CM

## 2023-07-19 PROCEDURE — 97803 MED NUTRITION INDIV SUBSEQ: CPT | Mod: 95

## 2023-07-19 NOTE — PROGRESS NOTES
"Tiffani is a 52 year old who is being evaluated via a billable video visit.      How would you like to obtain your AVS? MyChart  If the video visit is dropped, the invitation should be resent by: Text to cell phone: [phone]  Will anyone else be joining your video visit? No              Video-Visit Details    Type of service:  Video Visit   Video Start Time: 9:31am  Video End Time:10:07am    Originating Location (pt. Location): passenger in car    Distant Location (provider location):  Off-site  Platform used for Video Visit: [facility]    NUTRITION POST OP APPOINTMENT  DATE OF VISIT: July 19, 2023    Tiffani Brasher  1970  female  [id number]  52 year old     ASSESSMENT:    REASON FOR VISIT:  Tiffani is a 52 year old year old female presents today for 3 month PO nutrition follow-up appointment. Patient is accompanied by self.    DIAGNOSIS:  Status post gastric sleeve surgery.  Obesity Obesity Grade III BMI >40 (based on previous BMI of 41.05 kg/m2)    ANTHROPOMETRICS:  Initial Weight: 319 lbs   Height: 69.5\"    Current Weight: [n/a - pt does not weight herself but will update prior to provider visit tomorrow[    BMI: n/a    VITAMINS AND MINERALS:  None consistently    Has liquid Eladia FORDI; does not meet guidelines and takes inconsistently    Pt states vitamins were to be prescribed but never actually were ordered. Will relay recommendations to provider for tomorrow's visit.      NUTRITION HISTORY:  Breakfast: [wakes 9:30-10am, eats right away] eggs (1-2) +/- cheese +/- mild/medium salsa  yogurt  Unjury protein shake w/almond milk    Lunch: [2pm] skips 3x/week or salad  leftovers  meatballs in tomato sauce   Supper: [6:30pm] chicken + zucchini noodles  shrimp + zucchini  \"meat + vegetables\"  Snacks: none  Fluids consumed: Water/enhanced water (64oz+), coconut water (2 per day)   Consuming liquid calories: No  Protein intake: 50-60 grams/day  Tolerate regular texture food: Yes  Any foods not tolerated " details: Yes  If any food not tolerated: bread, pasta, rice, waffle  Portion size: 1/2-1 cup  Take 20-30 minutes to consume each meal: Usually   Eat protein foods first: Yes  Fluids and meals separate by at least 30 minutes: Yes  Chew foods thoroughly: Yes  Tolerating diet: Yes  Drinking high protein supplements: Yes (3-4x/week)  Consuming snacks per day: none  Additional Information: Pt overall feeling well but reports some trial and error with adapting to her new lifestyle. Reports vomiting ~3x/week; seems to be correlated with laying down after eating or eating too quickly. Reports foamies on an empty stomach; will relay to provider, may be beneficial to consider extending Omeprazole for a while longer. Pt anxious to start exercising; did not realize she was no longer on restrictions. Reviewed meal patterns and ratios, recommended daily protein drink supplementation.     PHYSICAL ACTIVITY:  None    DIAGNOSIS:  Previous Nutrition Diagnosis: Altered gastrointestinal function related to alteration in gastrointestinal structure as evidenced by history of gastric sleeve surgery.- no change    Previous goals:  Log if vomiting and what ate or drank before episode - not met  Start soft diet at day 28 post op. - met  Start prescribed multivitamin/ mineral ,calcium with vitamin D, vitamin B12, vitamin D, and thiamin - not met  Aim for 60 to 90 grams protein. - not met  Start 48 to 64 oz of fluid per day. - met    Current Nutrition Diagnosis: Altered gastrointestinal function related to alteration in gastrointestinal structure as evidenced by history of gastric sleeve surgery.    INTERVENTION:   Nutrition Prescription: Eat 3 meals a day at regular intervals. Consume 60-90 grams of protein daily. Follow post-surgical vitamin and mineral protocol.  Assessed learning needs and learning preferences.    GOALS:  Restart all post-op vitamins/minerals (to be prescribed)  Eat 3 meals/day  Build to 1c per meal and 3 food groups  Have  a daily protein drink  Start consistently exercising    Follow-Up:   Recommend standard post-op follow up visits to assist with lifestyle changes or per insurance.  Implementation: Discussed progress toward previous goals; reinforced importance of following bariatric lifestyle changes.    NUTRITION MONITORING AND EVALUATION:  Anticipated compliance: good  Verbalized good understanding.    Follow up: Patient to follow up in 3 months.    TIME SPENT WITH PATIENT:  36 minutes      Bridgett Torrez RD, LD  Clinical Dietitian

## 2023-07-19 NOTE — PATIENT INSTRUCTIONS
"Ervin Nixon!        It was so good to see this morning!! Here's a summary of your next diet stage (Stage 5). This will become your baseline diet, life-long.     Your Stage 5 Diet: Regular Foods  [email or web address]       Goals:    1. Aim for 600-800 calories  - Also: 60-90g protein and 10-15g fiber     2. Eat 3 food groups at each meal (build up to this over several months; slow and steady)  - 1/2c protein, 1/4c vegetable/fruit, 1/4c fruit/starch  - I'll mail you a new sample meal plan    Tips for starches  - \"3rd in line\" after having protein, vegetables/fruit  - Portions: 1/4c or less  - Choose whole grain options: ie brown rice, quinoa, whole wheat or lentil-based pasta, etc.  - Toast your bread (aim for 1/2 slice)    3.  Continue to eat slowly, chew well, and separate fluids and meals by 30 minutes     4. Limit \"snacks\" to milk or protein drinks   - Have up to 1 protein drink per day OR 2 cups of low-fat milk  - Try a clear protein drink: Premier Protein Clear, Protein -2-O, MyProtein (powder), Gatorade Zero Protein    5. Avoid caffeine, carbonation and alcohol    6. Consistently take all post-op vitamins/minerals  - I've sent Tessa (provider your seeing tomorrow) recommendations for Multivitamins, Calcium Vitamin D, Vitamin B1 and B12, and possibly iron, depending on how your labs look.             Plan on following up in 3 months. This can be scheduled via our call center at . Of course, reach out sooner with any questions or concerns. Have a great day!           Bridgett Torrez, RD, LD  Clinical Dietitian      "

## 2023-07-20 ENCOUNTER — TELEPHONE (OUTPATIENT)
Dept: NEUROLOGY | Facility: CLINIC | Age: 53
End: 2023-07-20

## 2023-07-20 ENCOUNTER — VIRTUAL VISIT (OUTPATIENT)
Dept: SURGERY | Facility: CLINIC | Age: 53
End: 2023-07-20
Payer: COMMERCIAL

## 2023-07-20 VITALS — BODY MASS INDEX: 37.77 KG/M2 | WEIGHT: 255 LBS | HEIGHT: 69 IN

## 2023-07-20 DIAGNOSIS — E66.01 CLASS 2 SEVERE OBESITY DUE TO EXCESS CALORIES WITH SERIOUS COMORBIDITY AND BODY MASS INDEX (BMI) OF 37.0 TO 37.9 IN ADULT (H): Primary | ICD-10-CM

## 2023-07-20 DIAGNOSIS — E66.812 CLASS 2 SEVERE OBESITY DUE TO EXCESS CALORIES WITH SERIOUS COMORBIDITY AND BODY MASS INDEX (BMI) OF 37.0 TO 37.9 IN ADULT (H): Primary | ICD-10-CM

## 2023-07-20 DIAGNOSIS — Z98.84 BARIATRIC SURGERY STATUS: ICD-10-CM

## 2023-07-20 DIAGNOSIS — R21 RASH: ICD-10-CM

## 2023-07-20 DIAGNOSIS — K21.9 GASTROESOPHAGEAL REFLUX DISEASE, UNSPECIFIED WHETHER ESOPHAGITIS PRESENT: ICD-10-CM

## 2023-07-20 DIAGNOSIS — K91.2 POSTSURGICAL MALABSORPTION: ICD-10-CM

## 2023-07-20 PROCEDURE — 99024 POSTOP FOLLOW-UP VISIT: CPT | Mod: GT | Performed by: PHYSICIAN ASSISTANT

## 2023-07-20 RX ORDER — CALCIUM CARBONATE/VITAMIN D3 600 MG-10
1 TABLET ORAL 2 TIMES DAILY
Qty: 180 TABLET | Refills: 3 | Status: SHIPPED | OUTPATIENT
Start: 2023-07-20 | End: 2023-12-12

## 2023-07-20 RX ORDER — NYSTATIN 100000 [USP'U]/G
POWDER TOPICAL 2 TIMES DAILY PRN
Qty: 60 G | Refills: 1 | Status: SHIPPED | OUTPATIENT
Start: 2023-07-20 | End: 2023-12-12

## 2023-07-20 RX ORDER — LANOLIN ALCOHOL/MO/W.PET/CERES
CREAM (GRAM) TOPICAL
Qty: 30 TABLET | Refills: 0 | Status: SHIPPED | OUTPATIENT
Start: 2023-07-20 | End: 2023-12-12

## 2023-07-20 RX ORDER — MENTHOL 5.8 MG/1
2 LOZENGE ORAL DAILY
Qty: 180 TABLET | Refills: 3 | Status: SHIPPED | OUTPATIENT
Start: 2023-07-20 | End: 2023-12-12

## 2023-07-20 RX ORDER — NYSTATIN 100000 U/G
CREAM TOPICAL 2 TIMES DAILY PRN
Qty: 30 G | Refills: 3 | Status: SHIPPED | OUTPATIENT
Start: 2023-07-20 | End: 2023-12-12

## 2023-07-20 NOTE — TELEPHONE ENCOUNTER
[facility]- need to change tomorrow's in person visit to a video visit per patient request. Doc agreed to switch appt to a video one.     Note from team- **Pls note that this has to be a video visit so that doc can show her the results. Also team will call you bit before 8am to complete a check in.**

## 2023-07-20 NOTE — PROGRESS NOTES
ESTABLISHED PATIENT NEUROLOGY NOTE    DATE OF VISIT: 7/21/2023  CLINIC LOCATION: [facility]  MRN: [id number]  PATIENT NAME: Tiffani Brasher  YOB: 1970    REASON FOR VISIT: No chief complaint on file.    SUBJECTIVE:                                                      HISTORY OF PRESENT ILLNESS: Patient is here to follow up regarding pain/paresthesia in the right lower extremity. Please refer to my initial note from 2/16/2023 for further information.    Since the last visit, the patient reports ***.  She denies interval development of new focal neurological symptoms.    EMG from 5/30/2023 was abnormal, but not diagnostic.  Increased insertional activity with possible myotonic discharge at the right upper lumbar paraspinal muscles (L3-L4) were seen.  It felt that it could be seen with right lumbar radiculopathy, but is not diagnostic in isolation.  No evidence of right femoral mononeuropathy or lumbar plexopathy was seen.  Tabulated data from EMG report were personally reviewed and independently interpreted.  EXAM:                                                    Physical Exam:   Vitals: [facility] 04/04/2019     General: pt is in NAD, cooperative.  Skin: normal turgor, moist mucous membranes, no lesions/rashes noticed.  HEENT: ATNC, white sclera, normal conjunctiva.  Respiratory: Symmetric lung excursion, no accessory respiratory muscle use.  Abdomen: Non distended.  Neurological: awake, cooperative, follows commands, no exam changes compared to the initial visit.  ASSESSMENT AND PLAN:                                                    Assessment: 52 year old female patient presents for follow-up of right leg paresthesia and possible mild weakness in the right iliopsoas and quadriceps femoris muscles.  She completed EMG, which was unrevealing.  Previously we discussed additional evaluation for central causes, including cervical/thoracic spine MRI and possibly brain MRI with and without  contrast.    Diagnoses:    ICD-10-CM    1. Paresthesia of right lower extremity  R20.2         Plan:  There are no Patient Instructions on file for this visit.    Total Time: *** minutes spent on the date of the encounter doing chart review, history and exam, documentation and further activities per the note.    Van Wade MD  [facility] Neurology  (Chart documentation was completed in part with Dragon voice-recognition software. Even though reviewed, some grammatical, spelling, and word errors may remain.)

## 2023-07-20 NOTE — PATIENT INSTRUCTIONS
"To ensure the quality you may receive a patient satisfaction survey. The greatest compliment you can give is \"Likely to Recommend\"    Nice to talk with you today. Thank you for allowing me the privilege of caring for you. Below is the plan discussed.-  . Tessa Almazan PA-C    Plan:  Labs ordered. Call [phone] to schedule.  Continue your bariatric vitamins - prescribed medications  Continue omeprazole and increase to taking twice a day  Respond to [employer] message with how much vitamin D you're taking  Start checking your blood pressure and follow-up with your regular clinic on your blood pressure meds    FOLLOW-UP:  Call [phone] to schedule next visit in 3 months.     Bariatric Post Op Guidelines  General:    To avoid marginal ulcers avoid all forms of tobacco, alcohol in excess, caffeine, and NSAIDS     Exercise is key for weight loss and weight maintenance. Aim for 30-60 minutes of physical activity most days.  Include cardiovascular and strength training.    Continue lifelong vitamins supplementation and annual lab follow up.  All  patients should supplement with the following bariatric postoperative vitamins:  2 Complete multivitamins with minerals (at different times than calcium)  Vitamin D 5000 Int Units/125 mg daily   Calcium 600 mg twice daily or 500 mg three times daily   Vitamin B12: 500 mcg sl daily or 1000 mcg Inj monthly  B complex daily or Thiamine 100 mg weekly  1 Iron/Vit C. Daily for females who menstruate and/or as directed    The bariatric team should be aware and evaluate all GI symptoms which can be a sign of complications. Inability to tolerate textured solid food (chicken, steak, fish) may need to be evaluated by endoscopy.    There is a 10% increase of Alcohol Use Disorder in patients with bariatric surgery.   Most often occurring around 2 years post op.  Call if you feel alcohol is interfering in your daily life.  We can help.     Follow up annually lifelong. Obesity is a chronic " disease.  Weight gain can be expected. The goal of follow-up visits is to ensure adequate vitamin and protein absorption, evaluate food intake behavior, review exercise/activity level, and assist with weight regain.    Nutritional:  Eat 3 meals per day  (No snacks between meals.)  Do not skip meals.  This can cause overeating at the next meal and will prevent adequate protein and nutritional intake.    Aim for 60-80 grams of protein per day.  Always eat your protein first. This assists with optimal nutrition and helps you stay full longer.    Eat your protein first, and then follow with fiber.    Add fiber by including fruits, vegetables, whole grains, and beans.     Portions should be about 1 cup per meal. Use measuring cups to be accurate.  Continue to use saucer/salad plates, infant/toddler silverware to keep portion sizes small and take small bites.    Eat S-L-O-W-L-Y to make each meal last 20-30 minutes. Always stop eating when satisfied.    Aim for 64 oz. of calorie-free fluids daily.    Avoid drinking 30 min before, during, and 30 min after meal    Avoid high sugar and high fat foods to prevent high calorie intake. This will reduce your rate of weight loss and can cause weight regain.   Check nutrition labels for less than 10 grams of sugar and less than 10 grams of fat per serving.

## 2023-07-21 ENCOUNTER — VIRTUAL VISIT (OUTPATIENT)
Dept: NEUROLOGY | Facility: CLINIC | Age: 53
End: 2023-07-21
Payer: COMMERCIAL

## 2023-07-21 DIAGNOSIS — R20.2 PARESTHESIA OF RIGHT LOWER EXTREMITY: Primary | ICD-10-CM

## 2023-07-21 DIAGNOSIS — M54.16 LUMBAR RADICULOPATHY: ICD-10-CM

## 2023-07-21 PROCEDURE — 99213 OFFICE O/P EST LOW 20 MIN: CPT | Mod: 24 | Performed by: PSYCHIATRY & NEUROLOGY

## 2023-07-21 NOTE — PATIENT INSTRUCTIONS
AFTER VISIT SUMMARY (AVS):    At today's visit we thoroughly discussed current symptoms, evaluation results, diagnosis, available treatment options, and the plan.    We decided to try physical therapy for your symptoms.  If pain worsens, epidural steroid injection at L5-S1 could also be tried.  Please let me know if this happens.    Next follow-up appointment is in the next 2-3 months or earlier if needed.    Please do not hesitate to call me with any questions or concerns.    Thanks.

## 2023-07-21 NOTE — PROGRESS NOTES
"ESTABLISHED NEUROLOGY TELEMEDICINE VISIT NOTE.    DATE OF VISIT: 7/21/2023  MRN: [id number]  PATIENT NAME: Tiffani Brasher  YOB: 1970    Tiffani Brasher is a 52 year old female who is being evaluated via a billable video visit.      The patient has been notified of following:     \"This video visit will be conducted via a call between you and your physician/provider. We have found that certain health care needs can be provided without the need for an in-person physical exam.  This service lets us provide the care you need with a video conversation.  If a prescription is necessary we can send it directly to your pharmacy.  If lab work is needed we can place an order for that and you can then stop by our lab to have the test done at a later time.    Video visits are billed at different rates depending on your insurance coverage.  Please reach out to your insurance provider with any questions.    If during the course of the call the physician/provider feels a video visit is not appropriate, you will not be charged for this service.\"    Patient has given verbal consent for Video visit? Yes    Will anyone else be joining your video visit? No  {If patient encounters technical issues they should call [phone].    I have reviewed and updated the patient's Past Medical History, Social History, Family History and Medication List.    Roya Siddiqi Visit Facilitator     Additional provider notes:    This is a telemedicine visit that was initiated by the patient and performed with the originating site at patient's home and the distant location, as specified below.  Verbal consent to participate in video visit was obtained.  I discussed with the patient the nature of our telemedicine visits, that:  - I would evaluate the patient and recommend diagnostics and treatments based on my assessment  - Our sessions are not being recorded and that personal health information is protected  - Our team would provide " follow-up care in person if/when the patient needs it.    REASON FOR VISIT:   Chief Complaint   Patient presents with     Follow Up     Numbness/Tingling- less frequent pain shooting down the leg     HISTORY OF PRESENT ILLNESS:                                                    Ms. Tiffani Brasher is 52 year old female patient, who was evaluated today by telemedicine visit for pain/paresthesia in the right lower extremity. Please refer to my initial note from 2/16/2023 for further information.    Since the last visit, the patient reports improvement of her shooting pain down the leg after bariatric surgery in April 2023. No urinary incontinence any longer, but continues to have paresthesias and mild degree of pain that originates in the lower back and radiates down her right leg.  She denies interval development of new focal neurological symptoms.    EMG from 5/30/2023 was abnormal, but not diagnostic.  Increased insertional activity with possible myotonic discharge at the right upper lumbar paraspinal muscles (L3-L4) were seen.  It felt that it could be seen with right lumbar radiculopathy, but is not diagnostic in isolation.  No evidence of right femoral mononeuropathy or lumbar plexopathy was seen.  Tabulated data from EMG report were personally reviewed and independently interpreted.  EXAM:                                                    Neurological: awake, cooperative, follows commands, no exam changes compared to the initial visit.  ASSESSMENT AND PLAN:                                                    Assessment: 52 year old female patient presents for telemedicine follow-up of right leg paresthesia and possible mild weakness in the right iliopsoas and quadriceps femoris muscles.  She completed EMG, which was unrevealing, although demonstrated possible involvement of lumbar paraspinal muscles at L3-L4 levels.  Compared to her lumbar spine MRI, no compromise or changes noticed at these levels, but she  has degenerative changes in L5-S1 level with mild to moderate bilateral foraminal narrowing, which in part could explain her symptoms (paraspinal muscles at this level were not checked on EMG).  We discussed that her presentation might be consistent with lumbar radiculopathy.  Given the reported improvement after bariatric surgery, would suggest trial of physical therapy.  We also discussed option of epidural steroid injection if pain worsens again.    Previously we discussed additional evaluation for central causes, including cervical/thoracic spine MRI and possibly brain MRI with and without contrast.  This tests might be considered if initial treatments are not effective.    DIAGNOSES:    ICD-10-CM    1. Paresthesia of right lower extremity  R20.2         PLAN: At today's visit we thoroughly discussed current symptoms, evaluation results, diagnosis, available treatment options, and the plan.    We decided to try physical therapy for her current symptoms.  If pain worsens, epidural steroid injection at L5-S1 could also be tried.  I advised her to let me know if this happens.    Next follow-up appointment is in the next 2-3 months or earlier if needed.    Video-Visit Details    Type of service:  Video Visit    Video Start Time: 8 AM.    Video End Time (time video stopped): 8:08 AM.    Originating Location (pt. Location): Home    Distant Location (provider location):  [facility]     Mode of Communication:  Video Conference via [employer]    Total Time: 21 minutes.  8 minutes were spent in video call and the rest for chart review in preparation for this visit and documentation.    Van Wade MD    [facility]    (Chart documentation was completed in part with Dragon voice-recognition software. Even though reviewed, some grammatical, spelling, and word errors may remain.)

## 2023-07-21 NOTE — LETTER
"    7/21/2023         RE: Tiffani Brasher  [address]  [facility] [address]        Dear Colleague,    Thank you for referring your patient, Tiffani Brasher, to the [facility]. Please see a copy of my visit note below.    ESTABLISHED NEUROLOGY TELEMEDICINE VISIT NOTE.    DATE OF VISIT: 7/21/2023  MRN: [id number]  PATIENT NAME: Tiffani Brashre  YOB: 1970    Tiffani Brasher is a 52 year old female who is being evaluated via a billable video visit.      The patient has been notified of following:     \"This video visit will be conducted via a call between you and your physician/provider. We have found that certain health care needs can be provided without the need for an in-person physical exam.  This service lets us provide the care you need with a video conversation.  If a prescription is necessary we can send it directly to your pharmacy.  If lab work is needed we can place an order for that and you can then stop by our lab to have the test done at a later time.    Video visits are billed at different rates depending on your insurance coverage.  Please reach out to your insurance provider with any questions.    If during the course of the call the physician/provider feels a video visit is not appropriate, you will not be charged for this service.\"    Patient has given verbal consent for Video visit? Yes    Will anyone else be joining your video visit? No  {If patient encounters technical issues they should call [phone].    I have reviewed and updated the patient's Past Medical History, Social History, Family History and Medication List.    Roya Siddiqi Visit Facilitator     Additional provider notes:    This is a telemedicine visit that was initiated by the patient and performed with the originating site at patient's home and the distant location, as specified below.  Verbal consent to participate in video visit was obtained.  I discussed with the " patient the nature of our telemedicine visits, that:  - I would evaluate the patient and recommend diagnostics and treatments based on my assessment  - Our sessions are not being recorded and that personal health information is protected  - Our team would provide follow-up care in person if/when the patient needs it.    REASON FOR VISIT:   Chief Complaint   Patient presents with     Follow Up     Numbness/Tingling- less frequent pain shooting down the leg     HISTORY OF PRESENT ILLNESS:                                                    Ms. Tiffani Brasher is 52 year old female patient, who was evaluated today by telemedicine visit for pain/paresthesia in the right lower extremity. Please refer to my initial note from 2/16/2023 for further information.    Since the last visit, the patient reports improvement of her shooting pain down the leg after bariatric surgery in April 2023. No urinary incontinence any longer, but continues to have paresthesias and mild degree of pain that originates in the lower back and radiates down her right leg.  She denies interval development of new focal neurological symptoms.    EMG from 5/30/2023 was abnormal, but not diagnostic.  Increased insertional activity with possible myotonic discharge at the right upper lumbar paraspinal muscles (L3-L4) were seen.  It felt that it could be seen with right lumbar radiculopathy, but is not diagnostic in isolation.  No evidence of right femoral mononeuropathy or lumbar plexopathy was seen.  Tabulated data from EMG report were personally reviewed and independently interpreted.  EXAM:                                                    Neurological: awake, cooperative, follows commands, no exam changes compared to the initial visit.  ASSESSMENT AND PLAN:                                                    Assessment: 52 year old female patient presents for telemedicine follow-up of right leg paresthesia and possible mild weakness in the right  iliopsoas and quadriceps femoris muscles.  She completed EMG, which was unrevealing, although demonstrated possible involvement of lumbar paraspinal muscles at L3-L4 levels.  Compared to her lumbar spine MRI, no compromise or changes noticed at these levels, but she has degenerative changes in L5-S1 level with mild to moderate bilateral foraminal narrowing, which in part could explain her symptoms (paraspinal muscles at this level were not checked on EMG).  We discussed that her presentation might be consistent with lumbar radiculopathy.  Given the reported improvement after bariatric surgery, would suggest trial of physical therapy.  We also discussed option of epidural steroid injection if pain worsens again.    Previously we discussed additional evaluation for central causes, including cervical/thoracic spine MRI and possibly brain MRI with and without contrast.  This tests might be considered if initial treatments are not effective.    DIAGNOSES:    ICD-10-CM    1. Paresthesia of right lower extremity  R20.2         PLAN: At today's visit we thoroughly discussed current symptoms, evaluation results, diagnosis, available treatment options, and the plan.    We decided to try physical therapy for her current symptoms.  If pain worsens, epidural steroid injection at L5-S1 could also be tried.  I advised her to let me know if this happens.    Next follow-up appointment is in the next 2-3 months or earlier if needed.    Video-Visit Details    Type of service:  Video Visit    Video Start Time: 8 AM.    Video End Time (time video stopped): 8:08 AM.    Originating Location (pt. Location): Home    Distant Location (provider location):  [facility]     Mode of Communication:  Video Conference via [employer]    Total Time: 21 minutes.  8 minutes were spent in video call and the rest for chart review in preparation for this visit and documentation.    Vna Wade MD    [facility]  [facility]    (Chart documentation was completed in part with Dragon voice-recognition software. Even though reviewed, some grammatical, spelling, and word errors may remain.)      Again, thank you for allowing me to participate in the care of your patient.        Sincerely,        Van Wade MD

## 2023-08-04 ENCOUNTER — TELEPHONE (OUTPATIENT)
Dept: SURGERY | Facility: CLINIC | Age: 53
End: 2023-08-04
Payer: COMMERCIAL

## 2023-08-04 DIAGNOSIS — K91.2 POSTSURGICAL MALABSORPTION: Primary | ICD-10-CM

## 2023-08-04 DIAGNOSIS — K21.9 GASTROESOPHAGEAL REFLUX DISEASE, UNSPECIFIED WHETHER ESOPHAGITIS PRESENT: Primary | ICD-10-CM

## 2023-08-04 DIAGNOSIS — K21.9 GASTROESOPHAGEAL REFLUX DISEASE, UNSPECIFIED WHETHER ESOPHAGITIS PRESENT: ICD-10-CM

## 2023-08-04 DIAGNOSIS — Z98.84 BARIATRIC SURGERY STATUS: ICD-10-CM

## 2023-08-04 RX ORDER — PANTOPRAZOLE SODIUM 40 MG/1
40 TABLET, DELAYED RELEASE ORAL 2 TIMES DAILY
Qty: 60 TABLET | Refills: 2 | Status: SHIPPED | OUTPATIENT
Start: 2023-08-04 | End: 2023-12-12

## 2023-08-04 NOTE — PROGRESS NOTES
Adding B1 and B6 as patient called and spoke w/nursing staff about vomiting. At time of my visit had sounded like vomiting had been related to if eating and laying down afterwards or too quickly as opposed to nausea related. Had sounded like she had been figuring out triggers and was working on avoiding them but since persistent will add these labs since she hasn't gotten the previous ones added. Working with nursing staff to contact pharmacy on omeprazole options to communicate w/patient.    Tessa Almazan, PA-C

## 2023-08-04 NOTE — TELEPHONE ENCOUNTER
Faxed upper endoscopy referral to [facility].  Confirmed faxed received at [phone] via RightFax.    Bettye Rivera RN on 8/4/2023 at 1:34 PM

## 2023-08-04 NOTE — TELEPHONE ENCOUNTER
"Returned pt's call re: Omeprazole rx and insurance issues.    Per pt, she has been off the omeprazole for 1 week due to issues picking up from pharmacy.  She states that the pharmacy told her that how the prescription is written, it is not covered by insurance.  Additionally, is only to get 30 pills at one time.  During last visit, pt reported to Tessa DAMON that she was still having significant nausea and is vomiting on a weekly basis.  It was recommended that patient increase her omeprazole to 20 mg BID.    Will contact pt's pharmacy to investigate if we can change the script in order to get the medication covered.    On a separate note, pt reported that yesterday morning she rolled over onto her stomach and ended up feeling nauseated and vomiting into a red solo cup.  Reports the vomit was red in color- reported that she \"threw up blood\", however she \"felt completely fine\".  Writer asked if this has ever happened before- pt denied.  Writer noted that in the future- pt should present to a UC/ED as this can be a sign of an emergency- pt states she is currently visiting her mother and prefers not to go to an ER outside of her \"normal location\" but will go if it happens again.     Reports getting in approx 48 fluid oz in daily.    Will route to provider for additional recommendations.    Bettye Rivera RN          "

## 2023-08-04 NOTE — TELEPHONE ENCOUNTER
Prior Authorization Retail Medication Request    Medication/Dose: PA:  pantoprazole (PROTONIX) 40 MG EC tablet 60 tablet 2 8/4/2023  --  Sig - Route: Take 1 tablet (40 mg) by mouth 2 times daily - Oral      ICD code (if different than what is on RX): [K21.9]   Previously Tried and Failed:   Rationale: Patient has met her total allowed 120 days of PPI therapy per year, however, due to concern for possible gastritis given s/p bariatric surgery, it is pertinent to continue treatment with PPIs.    Insurance Name: [employer] [place]  Insurance ID:  [id number]     Pharmacy Information (if different than what is on RX)  Name:  [id number] [place] [address] AT [facility] [address]   Phone: [phone]

## 2023-08-04 NOTE — TELEPHONE ENCOUNTER
VANESSA Zarate PA-C order EGD to [facility].  This was ordered.  Will be faxed later today.  Kimberly Mahan MS, RD, RN

## 2023-08-04 NOTE — PROGRESS NOTES
Called patient to discuss episode reported earlier noted by our nursing staff: Had left for [place] 2 days after our video visit to help take care of her mother so did not get blood work done. Did start some of the vitamins. Did not start Thiamine once weekly yet as pharmacy didn't have it in stock until just now so about to .     Overall she sounds well, strong, and not distressed.     She had not initially called about the red emesis episode but rather concerns with trying to get her omeprazole prescription - she was told she couldn't get it filled for a whole month at twice a day dosing.    Wednesday night had a house guest with her mother's house and she went to bed early at 6pm onwards. Woke up on an empty stomach with only having had some water throughout the night. Rolled over and layed flat on stomach and felt fluid building in her throat and had several episodes of dry heaving a good three minutes prior to getting some fluid up and wiped mouth with tissue near bed and closed her eyes a few minutes and tissue was pink and noted cup and most clear liquid with some bright red streaking in it but not pooled or clot. Since then states she's been 'fine': She had a couple fairlife shakes with 30g protein. Blueberry yogurt and rotisserie chicken and some lettuce and fine since then but having nausea. Had had some nausea before with some gnawing in stomach. Reports meeting water goals for the day but not always her food goals or will meet her food goals but not her water goals. States she gets skittish after a vomiting episode and so will eat very tentatively for a few days afterwards.    No red streaking or vomiting since then, no black/tarry stools, no diarrhea, no blood in stool.     Some lightheadedness that seems related to standing quickly or feeling like needing to eat no passing out. Has been checking blood pressure    Visiting Mom in [place] currently so unable to get blood work or  evaluation/studies in the [facility].    Has BP cuff at home - 129/89 pulse is 78.    She reports she is not thrilled at the idea of going to the ED in [place]. We discussed that my tests/etc are limited to orders that can be done here in [place]. Given she reports having upper abdominal gnawing/nausea/concern for hematemesis and does have lightheadedness w/standing - warrants ED evaluation in [place] and if cleared/stable for outpatient evaluation after that visit we will add on upper EGD for here and she will work on getting back to [place] this next week.    Plan to order 40mg protonix BID and upper GI EGD in anticipation of return.    MATTI LeiC

## 2023-08-04 NOTE — TELEPHONE ENCOUNTER
General Call    Contacts         Type Contact Phone/Fax    08/04/2023 10:03 AM CDT Phone (Incoming) Tiffani Brasher (Self) [phone] (M)          Reason for Call:  Omeprazole    What are your questions or concerns:  pt would like a call back regarding her Omeprazole prescription, states the way it was written her ins will not cover it    Could we send this information to you in [facility] or would you prefer to receive a phone call?:   Patient would prefer a phone call   Okay to leave a detailed message?: Yes at Cell number on file:    Telephone Information:   Mobile [phone]

## 2023-08-05 NOTE — PROGRESS NOTES
"[facility] ENCOUNTER  SUBJECTIVE/OBJECTIVE:                           Tiffani Brasher is a 50 year old female called for an initial visit. She was referred to me from Dr. Phillips. She did previously see Dr. Thompson.      Reason for visit: Comprehensive medication review. She would prefer to be taking as little medications as possible.     Allergies/ADRs: None  Tobacco: She reports that she has never smoked. She has never used smokeless tobacco.  Alcohol: 2-3 bottles of wine per month.    Caffeine: 1 cups/day of coffee  Activity: Three times per week she walks for 4200 steps. She is feeling down that she can't do more. She has pain in knees and ankles. She has an elliptical machine in the house which is is going to try to work her way up on.     Medication Adherence/Access: Patient takes medications directly from bottles.  Patient takes medications 1 time(s) per day, plus as needed medications.   Per patient, misses medication 1 times per week.     Hypertension: Currently taking propranolol ER 80 mg. At the time when this was started (June 2020), she had also been having migraines so the intent was for this to help with both issues. She does not like taking this medication. \"I really dont want to have to be dependent on anything\".     She has checked her BP at home and reports that it was 142/91 mmHg on 11/20.  She says this is a pretty typical number for her. Her machine does not say her pulse.     BP Readings from Last 3 Encounters:   05/15/20 (!) 153/109   04/23/19 117/78   04/18/19 133/88     Migraines: propranolol ER 80 mg once daily as above. She has not had a migraine recently. She thinks the migraines are seasonal, and she has one only once every three months. These go away with sleep and are not too bothersome to her. She has not needed to take anything for the pain. She does think that the headaches have decreased since starting the propranolol.     Depression/anxiety/PTSD: Currently taking sertraline 100 mg once " "daily (prescribed for 150 mg) and lorazepam 0.5 mg (prescribed for one tab twice daily, but she takes two at once). She takes the lorazepam more this Fall than in the past. Recently she has been using this twice per week on average. She uses the lorazepam when anxiety is very severe (trouble breathing, crying, panic attack). She does not drink on days when she takes lorazepam. She prefers to find something else for anxiety besides lorazepam. She says she was very hesitant to start taking the sertraline, and does not like that she needs this. However, she notes that it has been really helpful for her. \"It bothers me to admit that the sertraline works\". This is the reason for why she is taking less than prescribed. She does not miss doses, but prefers to take 100 mg vs 150 mg. She does not like the idea of being dependent on medications.     Her PTSD is due to past interactions with the medical profession and troubles with past surgeries. She felt like she was not being heard by past medical providers.     She states she has really bad anxiety. She did not want her hysterectomy, so she underwent crisis counseling prior to this and she still sees that therapist weekly. Also things are stressful in general with [facility]. Dr. Phillips recently put in a psychiatry referral since she stopped seeing her previous psychiatrist (was not a good fit per her report). No appointment has been made.     She was prescribed Adderall for a short period of time before leaving [place]. She was told that she had adult ADD, which is why this was tried. She is not sure if she actually has this or not. She did not like the way this made her feel, she no longer takes it.     Insomnia: Currently taking rozerem 8 mg as needed. Hydroxyzine was prescribed for sleep, but this was replaced with rozerem because she had hangover effects from hydroxyzine. She does not use the rozerem daily, only 2-3 times per week. This is helpful for staying asleep but not " helpful for falling asleep. The rozerem does not give as much of a sleep hangover and she feels like she sleeps well while taking it. In the past she has tried melatonin, magnesium, chamomile, meditation to try to help fall asleep. The magnesium was the most helpful. She notes she takes 2-3 hours to fall asleep some nights. Was scheduled for a sleep study but this was not done due to COVID. She is planning to do this soon. She has Rx for one tablet of zolpidem for this. She does not drink coffee past 10:30 am because it keeps her up at night.     Obestiy: Was seen today by CHILO Kim. Not currently on therapy. The plan is to lose 5 pounds and then be evaluated for weight loss surgery. She discussed diet in detail with the dietician. She plans to up the protein and lower carbs.     Immunizations: Recently turned 50 so she is eligible for Shingrix. She states she does not get flu shots due to compromised immune system. She states this is from past acute kidney failure and thalassemia, saying her immune system has not been 100% since then. She has never specifically been told not to receive the flu vaccine.     Supplements: Just instructed today by dietician to take calcium with vitamin D, vitamin D 5000 units, and a multivitamin with iron. She has not had a chance to purchase these yet. She notes in the past she had trouble with constipation while taking iron.    ASSESSMENT:                            Medication Adherence: No issues identified    Hypertension: Her blood pressure has been above goal of <130/80. She would benefit from additional therapy, and she is agreeable to this idea. Would benefit from doing labs ordered by Dr. Phillips and a nurse only blood pressure check at that time. Would consider starting lisinopril 5 mg at that time based on labs and BP reading.     Migraines: Stable.      Depression/anxiety/PTSD: Would benefit from establishing with new psychiatrist. We did discuss that there is room to  increase her sertraline, and she prefers to consider this for a while.     Insomnia: Rozerem is helpful, but she could use magnesium to help fall asleep since that worked well in the past. This may also help with constipation if that occurs with her new iron supplement. Plan in place to do sleep study.     Obestiy: Plan in place. Using elliptical would be good to get exercise without extra stress on joints.     Immunizations: She would benefit from getting influenza and Shingrix vaccine.     Supplements: Stable.     PLAN:                            1. Talk to your pharmacy about getting Flu and Shingles vaccine.  2. You can try using magnesium 250 mg at night as needed for sleep.   3. Call psychiatrist to make an appointment. There is room to increase the sertraline dose, which may be helpful for your anxiety. Talk with your new psychiatrist about this.    4. Schedule the labs that Dr. Phillips ordered, and also schedule a blood pressure check with a nurse at the clinic. If your blood pressure is high at that time, we may add lisinopril 5 mg.     I spent 60 minutes with this patient today. I offer these suggestions for consideration by Dr. Phillips. A copy of the visit note was provided to the patient's primary care provider.    Will follow up after labs and blood pressure check.     The patient was sent via [employer] a summary of these recommendations.     Domitila Johnson PharmD  [facility] Pharmacy Resident    Patient consented to a telehealth visit: yes  Telemedicine Visit Details  Type of service:  Telephone visit  Start Time: 1:55 PM  End Time: 2:55 PM  Originating Location (patient location): Home  Distant Location (provider location):  [facility]  Mode of Communication:  Telephone     The patient was seen independently by Dr. Johnson.  I have read the note and agree with the assessment and plan.  Gem Moran, [place], [facility]  Medication Therapy Management Provider  Pager: [phone]         Female

## 2023-08-07 ENCOUNTER — TELEPHONE (OUTPATIENT)
Dept: SURGERY | Facility: CLINIC | Age: 53
End: 2023-08-07
Payer: COMMERCIAL

## 2023-08-07 NOTE — TELEPHONE ENCOUNTER
Called patient to follow-up from her ER visit.     She reported that the ER saw roomed her right away, she got IV fluids, and was told she was a bit dehydrated. She reports they did a CT scan w/oral contrast and things 'looked fine' but offered to keep her and monitor her for a few days and she declined. She states that she did 'get sick' early this morning again on an empty stomach after she rolled over and after rolling over felt that she needed to vomit and vomited (and had diarrhea). Denies any blood at that time and has just been generally nauseated since. She did not  thiamine or other vitamins - states she is just taking the multitab with iron. She did not  the omeprazole as she thought it was going to be switched and the protonix was not available. We discussed that her insurance had a cap on 120 days PPI coverage and we have submitted a PA for an exception to this.      She states that she is going to send/work on consent for me to be able to see her ER results by the end of the day and will work on getting/starting thiamine (discussed this could be contributing to nausea if low and importance of taking B1 as well as B12). We discussed the concern for upper GI bleed/ulcer and need for upper endoscopy - orders placed but [place] and she stated she would get it done as soon as she gets back. She reports she will go back to the pharmacy today and if protonix is not available she will  omeprazole to take BID (discussed this would be preferable to not taking anything - although discussed I'd prefer protonix 40mg BID for possible upper GI bleed until r/o by upper endoscopy).     Conversation ended w/patient reiterating that she will be trying to work on MyChart so I can access her ER visit results and will go to the pharmacy.    Tessa Almazan, PA-C

## 2023-08-08 NOTE — TELEPHONE ENCOUNTER
PA Initiation    Medication: PANTOPRAZOLE SODIUM 40 MG PO [facility]  Insurance Company: [employer] - Phone [phone] Fax [phone]  Pharmacy Filling the Rx: [facility] #[id number] - [place] [address] AT [facility] [facility] [facility] [address]  Filling Pharmacy Phone: [phone]  Filling Pharmacy Fax: [phone]  Start Date: 8/8/2023

## 2023-08-09 NOTE — TELEPHONE ENCOUNTER
Prior Authorization Approval    Medication: PANTOPRAZOLE SODIUM 40 MG PO [facility]  Authorization Effective Date: 5/11/2023  Authorization Expiration Date: 8/9/2024  Approved Dose/Quantity: 60/30ds  Reference #: [id number]   Insurance Company: [employer] - Phone [phone] Fax [phone]  Which Pharmacy is filling the prescription: [facility] #[id number] [place] [address] AT [facility] [address] [facility] [address]  Pharmacy Notified: Yes  Patient Notified: Yes

## 2023-09-13 ENCOUNTER — PATIENT OUTREACH (OUTPATIENT)
Dept: CARE COORDINATION | Facility: CLINIC | Age: 53
End: 2023-09-13
Payer: COMMERCIAL

## 2023-10-11 ENCOUNTER — PATIENT OUTREACH (OUTPATIENT)
Dept: CARE COORDINATION | Facility: CLINIC | Age: 53
End: 2023-10-11
Payer: COMMERCIAL

## 2023-12-12 ENCOUNTER — TELEPHONE (OUTPATIENT)
Dept: FAMILY MEDICINE | Facility: CLINIC | Age: 53
End: 2023-12-12

## 2023-12-12 ENCOUNTER — VIRTUAL VISIT (OUTPATIENT)
Dept: SURGERY | Facility: CLINIC | Age: 53
End: 2023-12-12
Payer: COMMERCIAL

## 2023-12-12 VITALS — BODY MASS INDEX: 35.4 KG/M2 | HEIGHT: 69 IN | WEIGHT: 239 LBS

## 2023-12-12 DIAGNOSIS — R21 RASH: ICD-10-CM

## 2023-12-12 DIAGNOSIS — K91.2 POSTSURGICAL MALABSORPTION: ICD-10-CM

## 2023-12-12 DIAGNOSIS — K21.9 GASTROESOPHAGEAL REFLUX DISEASE, UNSPECIFIED WHETHER ESOPHAGITIS PRESENT: ICD-10-CM

## 2023-12-12 DIAGNOSIS — E66.01 MORBID OBESITY WITH BMI OF 40.0-44.9, ADULT (H): ICD-10-CM

## 2023-12-12 DIAGNOSIS — Z98.84 BARIATRIC SURGERY STATUS: Primary | ICD-10-CM

## 2023-12-12 DIAGNOSIS — I10 ESSENTIAL HYPERTENSION: Primary | ICD-10-CM

## 2023-12-12 DIAGNOSIS — E78.5 DYSLIPIDEMIA: ICD-10-CM

## 2023-12-12 PROCEDURE — 99214 OFFICE O/P EST MOD 30 MIN: CPT | Mod: VID | Performed by: PHYSICIAN ASSISTANT

## 2023-12-12 RX ORDER — CALCIUM CARBONATE/VITAMIN D3 600 MG-10
1 TABLET ORAL 2 TIMES DAILY
Qty: 180 TABLET | Refills: 3 | Status: SHIPPED | OUTPATIENT
Start: 2023-12-12

## 2023-12-12 RX ORDER — ONDANSETRON 4 MG/1
4 TABLET, ORALLY DISINTEGRATING ORAL EVERY 6 HOURS PRN
Qty: 10 TABLET | Refills: 0 | Status: SHIPPED | OUTPATIENT
Start: 2023-12-12

## 2023-12-12 RX ORDER — NYSTATIN 100000 [USP'U]/G
POWDER TOPICAL 2 TIMES DAILY PRN
Qty: 60 G | Refills: 1 | Status: SHIPPED | OUTPATIENT
Start: 2023-12-12

## 2023-12-12 RX ORDER — MIRTAZAPINE 15 MG/1
TABLET, FILM COATED ORAL
COMMUNITY
Start: 2023-07-21 | End: 2023-12-18

## 2023-12-12 RX ORDER — LANOLIN ALCOHOL/MO/W.PET/CERES
CREAM (GRAM) TOPICAL
Qty: 30 TABLET | Refills: 0 | Status: SHIPPED | OUTPATIENT
Start: 2023-12-12

## 2023-12-12 RX ORDER — MENTHOL 5.8 MG/1
2 LOZENGE ORAL DAILY
Qty: 180 TABLET | Refills: 3 | Status: SHIPPED | OUTPATIENT
Start: 2023-12-12

## 2023-12-12 RX ORDER — NYSTATIN 100000 U/G
CREAM TOPICAL 2 TIMES DAILY PRN
Qty: 30 G | Refills: 3 | Status: SHIPPED | OUTPATIENT
Start: 2023-12-12

## 2023-12-12 NOTE — PATIENT INSTRUCTIONS
"To ensure the quality you may receive a patient satisfaction survey. The greatest compliment you can give is \"Likely to Recommend\"    Nice to talk with you today. Thank you for allowing me the privilege of caring for you. Below is the plan discussed.-  . Tessa Almazan PA-C    Plan:  Labs ordered. Call [phone] to schedule.  Continue your bariatric vitamins     Take omeprazole daily to see if this helps your nausea.    Nystatin powder and ointment refilled for skin fold rashes.     Schedule a follow-up with the dietitians as soon as you can - I'm concerned you're not getting enough food in during the day.     FOLLOW-UP:  Call [phone] to schedule next visit with the dietitians and then with a provider in April 2024 for you annual visit.     Bariatric Post Op Guidelines  General:    To avoid marginal ulcers avoid all forms of tobacco, alcohol in excess, caffeine, and NSAIDS     Exercise is key for weight loss and weight maintenance. Aim for 30-60 minutes of physical activity most days.  Include cardiovascular and strength training.    Continue lifelong vitamins supplementation and annual lab follow up.  All  patients should supplement with the following bariatric postoperative vitamins:  2 Complete multivitamins with minerals (at different times than calcium)  Vitamin D 5000 Int Units/125 mg daily   Calcium 600 mg twice daily or 500 mg three times daily   Vitamin B12: 500 mcg sl daily or 1000 mcg Inj monthly  B complex daily or Thiamine 100 mg weekly  1 Iron/Vit C. Daily for females who menstruate and/or as directed    The bariatric team should be aware and evaluate all GI symptoms which can be a sign of complications. Inability to tolerate textured solid food (chicken, steak, fish) may need to be evaluated by endoscopy.    There is a 10% increase of Alcohol Use Disorder in patients with bariatric surgery.   Most often occurring around 2 years post op.  Call if you feel alcohol is interfering in your daily " life.  We can help.     Follow up annually lifelong. Obesity is a chronic disease.  Weight gain can be expected. The goal of follow-up visits is to ensure adequate vitamin and protein absorption, evaluate food intake behavior, review exercise/activity level, and assist with weight regain.    Nutritional:  Eat 3 meals per day  (No snacks between meals.)  Do not skip meals.  This can cause overeating at the next meal and will prevent adequate protein and nutritional intake.    Aim for 60-80 grams of protein per day.  Always eat your protein first. This assists with optimal nutrition and helps you stay full longer.    Eat your protein first, and then follow with fiber.    Add fiber by including fruits, vegetables, whole grains, and beans.     Portions should be about 1 cup per meal. Use measuring cups to be accurate.  Continue to use saucer/salad plates, infant/toddler silverware to keep portion sizes small and take small bites.    Eat S-L-O-W-L-Y to make each meal last 20-30 minutes. Always stop eating when satisfied.    Aim for 64 oz. of calorie-free fluids daily.    Avoid drinking 30 min before, during, and 30 min after meal    Avoid high sugar and high fat foods to prevent high calorie intake. This will reduce your rate of weight loss and can cause weight regain.   Check nutrition labels for less than 10 grams of sugar and less than 10 grams of fat per serving.

## 2023-12-12 NOTE — TELEPHONE ENCOUNTER
"TO PCP    Pt calling, asking for \"full lab workup\" for post surgery of gastric sleeve in April 2023. Pt states Dr. Almazan in wt loss clinic is asking for labs to make sure they are normal.    Pt has appt with PCP, but would like to know if labs need to be drawn before appt?    Appointments in Next Year      Dec 12, 2023 10:00 AM  (Arrive by 9:50 AM)  Return Bariatric Visit with Tessa Almazan PA-C  [facility] [place] ([facility] ) [phone]       DARREN PENA RN on 12/12/2023 at 10:50 AM    "

## 2023-12-12 NOTE — PROGRESS NOTES
"Tiffani is a 53 year old who is being evaluated via a billable video visit.      The patient has been notified of following:     \"This video visit will be conducted via a call between you and your physician/provider. We have found that certain health care needs can be provided without the need for an in-person physical exam.  This service lets us provide the care you need with a video conversation.  If a prescription is necessary we can send it directly to your pharmacy.  If lab work is needed we can place an order for that and you can then stop by our lab to have the test done at a later time.    Video visits are billed at different rates depending on your insurance coverage.  Please reach out to your insurance provider with any questions.    If during the course of the call the physician/provider feels a video visit is not appropriate, you will not be charged for this service.\"    Patient has given verbal consent for Video visit? Yes    How would you like to obtain your AVS? MyChart    If the video visit is dropped, the invitation should be resent by: Text to cell phone: [phone]    Will anyone else be joining your video visit? No    I    Video-Visit Details    Type of service:  Video Visit    Video Start Time: 10:07 AM    Video End Time:10:30 AM    Originating Location (pt. Location): in [place]    Distant Location (provider location):  [facility]     Platform used for Video Visit: [facility] Bariatric Surgery Note    BARIATRIC FOLLOW UP 12/12/2023         HISTORY OF PRESENT ILLNESS: Pt presents today for her follow-up appointment status post laparoscopic gastric sleeve.     Assessment & Plan   Problem List Items Addressed This Visit       Morbid obesity with BMI of 40.0-44.9, adult (H)    Relevant Medications    ondansetron (ZOFRAN ODT) 4 MG ODT tab     Other Visit Diagnoses       Bariatric surgery status    -  Primary    Relevant Medications    omeprazole (PRILOSEC) 20 " MG DR capsule    cyanocobalamin (VITAMIN B-12) 500 MCG SUBL sublingual tablet    Multiple Vitamins-Iron (QC DAILY MULTIVITAMINS/IRON) TABS    Other Relevant Orders    CBC with platelets    Vitamin A    Postsurgical malabsorption        Relevant Medications    nystatin (MYCOSTATIN) 272512 UNIT/GM external powder    nystatin (MYCOSTATIN) 442452 UNIT/GM external cream    calcium carbonate-vitamin D (CALTRATE) 600-10 MG-MCG per tablet    cyanocobalamin (VITAMIN B-12) 500 MCG SUBL sublingual tablet    Multiple Vitamins-Iron (QC DAILY MULTIVITAMINS/IRON) TABS    thiamine (B-1) 100 MG tablet    Other Relevant Orders    CBC with platelets    Gastroesophageal reflux disease, unspecified whether esophagitis present        Relevant Medications    omeprazole (PRILOSEC) 20 MG DR capsule    ondansetron (ZOFRAN ODT) 4 MG ODT tab    Rash        Relevant Medications    nystatin (MYCOSTATIN) 093019 UNIT/GM external powder    nystatin (MYCOSTATIN) 098656 UNIT/GM external cream             PATIENT INSTRUCTIONS:  Plan:  Labs ordered. Call [phone] to schedule.  Continue your bariatric vitamins     Take omeprazole daily to see if this helps your nausea.    Nystatin powder and ointment refilled for skin fold rashes.     Schedule a follow-up with the dietitians as soon as you can - I'm concerned you're not getting enough food in during the day.     FOLLOW-UP:  Call [phone] to schedule next visit with the dietitians and then with a provider in April 2024 for you annual visit.     34 minutes spent on the date of the encounter doing chart review, history and exam, result review, counseling, developing plan of care, documentation, and further activities as noted      INTERVAL HX:     Post-bariatric surgery belated 6 month follow-up for gastric sleeve with Dr. Martinez 4/26/23.  Last seen by myself 7/20/2023 with plan to continue omeprazole and increase it to twice a day as well as to get labs.  She did contact us while she was out of  studies that she was having some bloody emesis in the ER in August with the workup and offered observation admission but chose to discharge.  I had sent in for her to switch to Protonix and ordered outpatient labs as well as an EGD which were ultimately never done.    Went to ER - got IVF, fentanyl, 'full work up' and sent home with pain killers she didn't take.    Now doesn't have any of that or know why it was there this summer. Not getting to a full cup of food yet and finding slipping into old habit of not eating if not hungry in the past.     Entering Dad into Hospice now.    Mother's multiple myeloma numbers are increasing (in [place]). There about half the month     Will have coffee/espresso plain in the morning, something small in the afternoon and then around 7pm. Discussed recommend not having coffee/caffeine. Denies abdominal pain/burning/acid in mouth - does have some nausea from time to time. Foamies have now gone away. Around 239 - 244 lbs for the past month.     WEIGHT METRICS:  Body mass index is 35.29 kg/m .   Current Weight: 239 lb (108.4 kg)                Wt Readings from Last 10 Encounters:   12/12/23 239 lb (108.4 kg)   07/20/23 255 lb (115.7 kg)   05/31/23 277 lb 1.6 oz (125.7 kg)   05/31/23 276 lb 6.4 oz (125.4 kg)   05/30/23 278 lb (126.1 kg)   04/26/23 304 lb (137.9 kg)   04/24/23 307 lb 9.6 oz (139.5 kg)   02/16/23 313 lb (142 kg)   02/15/23 310 lb 6.4 oz (140.8 kg)   10/13/22 328 lb (148.8 kg)        VITAMINS:  Taking prescribed vitamins - down to two Multivitamins left  Sublingual B12 prescribed  Twice daijly  Out of thiamine - ran out around Halloween   5000 vitamin D    Having nausea 2-3 times weekly.       EXERCISE:  Pt is exercising by walking and just joined a gym.     OBESITY RELATED CONDITIONS:  Off CPAP for MAIN  Unknown about HTN - needs to see PCP    EATING HABITS:  How many meals do you eat per day?  2  Do you snack between meals?  No  Are you able to separate your meals and  "liquids by at least 30 minutes?  Yes  Are you able to avoid liquid calories?   Protein shakes    SOCIAL HISTORY:  Pt denies smoking.  Pt denies alcohol use - did have a drink Nov 29th, once glass of wine.  Avoids NSAIDS.      REVIEW OF SYSTEMS:     GI:  Nausea-  Yes  Vomiting-  Rare - 2-3 times monthly if after eating something not sitting well/too fast  Diarrhea-  No  Constipation-  No  Dysphagia-  No  Abd. Pain-  No  Heartburn-  No     SKIN:  Intertriginous irritation- No    PSYCH:  Mood: Stable - working with therapist. Lots of difficulties as noted above.     LABS/IMAGING/MEDICAL RECORDS REVIEW:   Reviewed in Epic    PHYSICAL EXAMINATION:  Ht 5' 9\" (1.753 m)   Wt 239 lb (108.4 kg)   LMP 04/04/2019   BMI 35.29 kg/m      GENERAL: Healthy, alert and no distress  EYES: Eyes grossly normal to inspection.    RESP: No audible wheeze, cough, or visible cyanosis.  No increased work of breathing.    SKIN: Visible skin clear. No significant rash, abnormal pigmentation or lesions.  NEURO: Mentation and speech appropriate for age.  PSYCH: Mentation appears normal, affect normal/bright, judgement and insight intact, normal speech and appearance well-groomed.        COUNSELING PROVIDED:  We reviewed the important post op bariatric recommendations:  eating 3 meals daily  eating protein first, getting >60gm protein daily  eating slowly, chewing food well  avoiding/limiting calorie containing beverages  avoiding fluids 30 minutes before, during, and after meals  limiting restaurant or cafeteria eating to twice a week or less  Pt reminded to avoid marginal ulcers she should avoid tobacco at all, alcohol in excess, caffeine, and NSAIDS (unless indicated for cardioprotection or otherwise and opposed by a PPI).  Pt encouraged to establish and maintain a consistent physical activity routine, 6-8 hours of restorative sleep each night and optimal stress management.  Pt counseled on the importance of life long vitamin supplementation " and life long follow up.      Tessa Almazan PA-C

## 2023-12-13 NOTE — PROGRESS NOTES
"NUTRITION POST OP APPOINTMENT  DATE OF VISIT: December 13, 2023    Tiffani Brasher  1970  female  [id number]  53 year old     ASSESSMENT:    REASON FOR VISIT:  Tiffani is a 53 year old year old female presents today for ~8 month PO nutrition follow-up appointment. Patient is accompanied by self.    DIAGNOSIS:  Status post gastric sleeve surgery.  Obesity Obesity Grade II BMI 35-39.9     ANTHROPOMETRICS:  Initial Weight: 319 lbs    Height: 69\"    Current Weight: 241 lbs 6.4 oz     BMI: Body mass index is 35.65 kg/m .    VITAMINS AND MINERALS:  2 Multivitamin with Minerals  600 mg Calcium With Vitamin D BID/separate from MVT   5000 international unit(s)  Vitamin D  500 mcg Vitamin B-12 sublingual  100mg Thiamin 1x/week    **Pt states she is somewhat inconsistent with all vitamins except MVT.       NUTRITION HISTORY:  Breakfast: protein shake  eggs   Lunch: skips  Supper: meat + vegetables +/- starch  Snacks: rare; fruit  Fluids consumed: water (30-60oz), fair life protein shakes, iced double espresso (oat milk, occasional), tried prosecco at thanksgiving  Consuming liquid calories: Not usually  Protein intake: 30-40 grams/day  Tolerate regular texture food: Yes  Any foods not tolerated details: Yes  If any food not tolerated: bread, rice and pasta - tolerates but sits heavy  reactive hypoglycemia after burrito  Portion size: 1/2-1 cup  Take 20-30 minutes to consume each meal: Yes   Eat protein foods first: Yes  Fluids and meals separate by at least 30 minutes: Yes  Chew foods thoroughly: Yes  Tolerating diet: Yes  Drinking high protein supplements: No/inconsistent  Consuming snacks per day: rare  Additional Information: Pt reports overall feeling well and tolerating diet; previous struggles with N/V are significantly improved, now will vomit about every other week, usually from eating too fast or not  fluids from meals. Appropriate questions about portion goals and meal timing. She would like to reach " 195 lbs and then maintain. Reviewed rationale for including regular protein supplementation and strength training.         PHYSICAL ACTIVITY:  Walks fairly regularly and just joined gym to start doing some strength training.     DIAGNOSIS:  Previous Nutrition Diagnosis: Altered gastrointestinal function related to alteration in gastrointestinal structure as evidenced by history of gastric sleeve surgery.- no change    Previous goals:  Restart all post-op vitamins/minerals (to be prescribed) - partially met  Eat 3 meals/day - not met  Build to 1c per meal and 3 food groups - ongoing  Have a daily protein drink - not met  Start consistently exercising - ongoing    Current Nutrition Diagnosis: Altered gastrointestinal function related to alteration in gastrointestinal structure as evidenced by history of gastric sleeve surgery.    INTERVENTION:   Nutrition Prescription: Eat 3 meals a day at regular intervals. Consume 60-90 grams of protein daily. Follow post-surgical vitamin and mineral protocol.  Assessed learning needs and learning preferences.    GOALS:  Eat 3 meals/day   Have a daily protein shake  Add in strength training  Take vitamins consistently    Follow-Up:   Recommend follow up visit in April to assist with lifestyle changes or per insurance.  Implementation: Discussed progress toward previous goals; reinforced importance of following bariatric lifestyle changes.    NUTRITION MONITORING AND EVALUATION:  Anticipated compliance: fair-good  Verbalized good understanding.    Follow up: Patient to follow up in 4 months. (At 1y post-op)    TIME SPENT WITH PATIENT:  30 minutes      Bridgett Torrze RD, LD  Clinical Dietitian

## 2023-12-14 ENCOUNTER — OFFICE VISIT (OUTPATIENT)
Dept: SURGERY | Facility: CLINIC | Age: 53
End: 2023-12-14
Payer: COMMERCIAL

## 2023-12-14 ENCOUNTER — LAB (OUTPATIENT)
Dept: LAB | Facility: CLINIC | Age: 53
End: 2023-12-14
Payer: COMMERCIAL

## 2023-12-14 VITALS — WEIGHT: 241.4 LBS | BODY MASS INDEX: 35.76 KG/M2 | HEIGHT: 69 IN

## 2023-12-14 DIAGNOSIS — Z12.11 COLON CANCER SCREENING: ICD-10-CM

## 2023-12-14 DIAGNOSIS — F41.1 GAD (GENERALIZED ANXIETY DISORDER): ICD-10-CM

## 2023-12-14 DIAGNOSIS — Z98.84 BARIATRIC SURGERY STATUS: Primary | ICD-10-CM

## 2023-12-14 DIAGNOSIS — Z98.84 BARIATRIC SURGERY STATUS: ICD-10-CM

## 2023-12-14 DIAGNOSIS — E78.5 DYSLIPIDEMIA: ICD-10-CM

## 2023-12-14 DIAGNOSIS — E66.01 CLASS 2 SEVERE OBESITY DUE TO EXCESS CALORIES WITH SERIOUS COMORBIDITY AND BODY MASS INDEX (BMI) OF 37.0 TO 37.9 IN ADULT (H): ICD-10-CM

## 2023-12-14 DIAGNOSIS — I10 ESSENTIAL HYPERTENSION: ICD-10-CM

## 2023-12-14 DIAGNOSIS — K91.2 POSTSURGICAL MALABSORPTION: ICD-10-CM

## 2023-12-14 DIAGNOSIS — E66.812 CLASS 2 SEVERE OBESITY DUE TO EXCESS CALORIES WITH SERIOUS COMORBIDITY AND BODY MASS INDEX (BMI) OF 37.0 TO 37.9 IN ADULT (H): ICD-10-CM

## 2023-12-14 LAB
ERYTHROCYTE [DISTWIDTH] IN BLOOD BY AUTOMATED COUNT: 13.1 % (ref 10–15)
HCT VFR BLD AUTO: 45.9 % (ref 35–47)
HGB BLD-MCNC: 14.4 G/DL (ref 11.7–15.7)
MCH RBC QN AUTO: 30.8 PG (ref 26.5–33)
MCHC RBC AUTO-ENTMCNC: 31.4 G/DL (ref 31.5–36.5)
MCV RBC AUTO: 98 FL (ref 78–100)
PLATELET # BLD AUTO: 237 10E3/UL (ref 150–450)
RBC # BLD AUTO: 4.67 10E6/UL (ref 3.8–5.2)
WBC # BLD AUTO: 4.6 10E3/UL (ref 4–11)

## 2023-12-14 PROCEDURE — 84207 ASSAY OF VITAMIN B-6: CPT | Mod: 90

## 2023-12-14 PROCEDURE — 84590 ASSAY OF VITAMIN A: CPT | Mod: 90

## 2023-12-14 PROCEDURE — 83550 IRON BINDING TEST: CPT

## 2023-12-14 PROCEDURE — 82728 ASSAY OF FERRITIN: CPT

## 2023-12-14 PROCEDURE — 80061 LIPID PANEL: CPT

## 2023-12-14 PROCEDURE — 80053 COMPREHEN METABOLIC PANEL: CPT

## 2023-12-14 PROCEDURE — 84425 ASSAY OF VITAMIN B-1: CPT | Mod: 90

## 2023-12-14 PROCEDURE — 82607 VITAMIN B-12: CPT

## 2023-12-14 PROCEDURE — 99000 SPECIMEN HANDLING OFFICE-LAB: CPT

## 2023-12-14 PROCEDURE — 36415 COLL VENOUS BLD VENIPUNCTURE: CPT

## 2023-12-14 PROCEDURE — 85027 COMPLETE CBC AUTOMATED: CPT

## 2023-12-14 PROCEDURE — 82306 VITAMIN D 25 HYDROXY: CPT

## 2023-12-14 PROCEDURE — 97803 MED NUTRITION INDIV SUBSEQ: CPT

## 2023-12-14 PROCEDURE — 83970 ASSAY OF PARATHORMONE: CPT

## 2023-12-14 PROCEDURE — 83540 ASSAY OF IRON: CPT

## 2023-12-14 RX ORDER — ALPRAZOLAM 0.25 MG
TABLET ORAL
Qty: 15 TABLET | Refills: 0 | Status: SHIPPED | OUTPATIENT
Start: 2023-12-14 | End: 2023-12-18

## 2023-12-14 RX ORDER — PROPRANOLOL HYDROCHLORIDE 80 MG/1
80 CAPSULE, EXTENDED RELEASE ORAL DAILY
Qty: 90 CAPSULE | Refills: 0 | Status: SHIPPED | OUTPATIENT
Start: 2023-12-14 | End: 2024-07-10

## 2023-12-14 NOTE — PATIENT INSTRUCTIONS
Ervin Nixon!      Great seeing you again today! Here's the goals we discussed:    Eat balanced meals at regular intervals  - First meal within 1 hour of waking, then every 4-6 hours      Include a protein shake on daily basis  - Consider clear options like Gatorade Protein, Premier Protein clear, Protein 2 O, MyProtein (powder)           Plan on following up in April. This can be scheduled via our call center at . Don't hesitate to reach out sooner with any questions or concerns. Have a great day!      Bridgett Torrez, JOHN, LD  Clinical Dietitian

## 2023-12-15 LAB
ALBUMIN SERPL BCG-MCNC: 4.2 G/DL (ref 3.5–5.2)
ALP SERPL-CCNC: 72 U/L (ref 40–150)
ALT SERPL W P-5'-P-CCNC: 13 U/L (ref 0–50)
ANION GAP SERPL CALCULATED.3IONS-SCNC: 7 MMOL/L (ref 7–15)
AST SERPL W P-5'-P-CCNC: 18 U/L (ref 0–45)
BILIRUB SERPL-MCNC: 0.3 MG/DL
BUN SERPL-MCNC: 11.6 MG/DL (ref 6–20)
CALCIUM SERPL-MCNC: 9.8 MG/DL (ref 8.6–10)
CHLORIDE SERPL-SCNC: 106 MMOL/L (ref 98–107)
CHOLEST SERPL-MCNC: 241 MG/DL
CREAT SERPL-MCNC: 0.76 MG/DL (ref 0.51–0.95)
DEPRECATED HCO3 PLAS-SCNC: 30 MMOL/L (ref 22–29)
EGFRCR SERPLBLD CKD-EPI 2021: >90 ML/MIN/1.73M2
FASTING STATUS PATIENT QL REPORTED: YES
FERRITIN SERPL-MCNC: 69 NG/ML (ref 11–328)
GLUCOSE SERPL-MCNC: 90 MG/DL (ref 70–99)
HDLC SERPL-MCNC: 65 MG/DL
IRON BINDING CAPACITY (ROCHE): 259 UG/DL (ref 240–430)
IRON SATN MFR SERPL: 36 % (ref 15–46)
IRON SERPL-MCNC: 94 UG/DL (ref 37–145)
LDLC SERPL CALC-MCNC: 155 MG/DL
NONHDLC SERPL-MCNC: 176 MG/DL
POTASSIUM SERPL-SCNC: 3.9 MMOL/L (ref 3.4–5.3)
PROT SERPL-MCNC: 7.2 G/DL (ref 6.4–8.3)
PTH-INTACT SERPL-MCNC: 42 PG/ML (ref 15–65)
SODIUM SERPL-SCNC: 143 MMOL/L (ref 135–145)
TRIGL SERPL-MCNC: 107 MG/DL
VIT B12 SERPL-MCNC: 197 PG/ML (ref 232–1245)
VIT D+METAB SERPL-MCNC: 24 NG/ML (ref 20–50)

## 2023-12-17 ENCOUNTER — HEALTH MAINTENANCE LETTER (OUTPATIENT)
Age: 53
End: 2023-12-17

## 2023-12-17 LAB
ANNOTATION COMMENT IMP: NORMAL
PYRIDOXAL PHOS SERPL-SCNC: 14.4 NMOL/L
RETINYL PALMITATE SERPL-MCNC: 0.02 MG/L
VIT A SERPL-MCNC: 0.47 MG/L

## 2023-12-18 ENCOUNTER — VIRTUAL VISIT (OUTPATIENT)
Dept: FAMILY MEDICINE | Facility: CLINIC | Age: 53
End: 2023-12-18
Payer: COMMERCIAL

## 2023-12-18 DIAGNOSIS — E66.01 MORBID OBESITY WITH BMI OF 40.0-44.9, ADULT (H): ICD-10-CM

## 2023-12-18 DIAGNOSIS — E78.5 HYPERLIPIDEMIA LDL GOAL <100: ICD-10-CM

## 2023-12-18 DIAGNOSIS — E66.812 CLASS 2 SEVERE OBESITY WITH SERIOUS COMORBIDITY AND BODY MASS INDEX (BMI) OF 35.0 TO 35.9 IN ADULT, UNSPECIFIED OBESITY TYPE (H): ICD-10-CM

## 2023-12-18 DIAGNOSIS — Z78.0 POST-MENOPAUSAL: ICD-10-CM

## 2023-12-18 DIAGNOSIS — R79.89 LOW VITAMIN D LEVEL: ICD-10-CM

## 2023-12-18 DIAGNOSIS — Z12.11 COLON CANCER SCREENING: ICD-10-CM

## 2023-12-18 DIAGNOSIS — F41.1 GAD (GENERALIZED ANXIETY DISORDER): Primary | ICD-10-CM

## 2023-12-18 DIAGNOSIS — Z90.3 H/O GASTRIC SLEEVE: ICD-10-CM

## 2023-12-18 DIAGNOSIS — G47.33 OSA (OBSTRUCTIVE SLEEP APNEA): ICD-10-CM

## 2023-12-18 DIAGNOSIS — E66.01 CLASS 2 SEVERE OBESITY WITH SERIOUS COMORBIDITY AND BODY MASS INDEX (BMI) OF 35.0 TO 35.9 IN ADULT, UNSPECIFIED OBESITY TYPE (H): ICD-10-CM

## 2023-12-18 PROCEDURE — 99214 OFFICE O/P EST MOD 30 MIN: CPT | Mod: VID | Performed by: INTERNAL MEDICINE

## 2023-12-18 RX ORDER — ALPRAZOLAM 0.25 MG
0.25 TABLET ORAL DAILY PRN
Qty: 15 TABLET | Refills: 1 | Status: SHIPPED | OUTPATIENT
Start: 2023-12-18 | End: 2024-07-09

## 2023-12-18 RX ORDER — SERTRALINE HYDROCHLORIDE 100 MG/1
150 TABLET, FILM COATED ORAL DAILY
Qty: 135 TABLET | Refills: 1 | Status: SHIPPED | OUTPATIENT
Start: 2023-12-18 | End: 2024-07-09

## 2023-12-18 RX ORDER — CHOLECALCIFEROL (VITAMIN D3) 50 MCG
1 TABLET ORAL 2 TIMES DAILY
Qty: 180 TABLET | Refills: 3 | Status: SHIPPED | OUTPATIENT
Start: 2023-12-18

## 2023-12-18 RX ORDER — ROSUVASTATIN CALCIUM 20 MG/1
20 TABLET, COATED ORAL DAILY
Qty: 90 TABLET | Refills: 3 | Status: SHIPPED | OUTPATIENT
Start: 2023-12-18

## 2023-12-18 RX ORDER — PROPRANOLOL HYDROCHLORIDE 20 MG/1
20 TABLET ORAL DAILY PRN
Qty: 90 TABLET | Refills: 1 | Status: SHIPPED | OUTPATIENT
Start: 2023-12-18

## 2023-12-18 ASSESSMENT — ANXIETY QUESTIONNAIRES
6. BECOMING EASILY ANNOYED OR IRRITABLE: SEVERAL DAYS
7. FEELING AFRAID AS IF SOMETHING AWFUL MIGHT HAPPEN: MORE THAN HALF THE DAYS
2. NOT BEING ABLE TO STOP OR CONTROL WORRYING: NEARLY EVERY DAY
5. BEING SO RESTLESS THAT IT IS HARD TO SIT STILL: NOT AT ALL
1. FEELING NERVOUS, ANXIOUS, OR ON EDGE: MORE THAN HALF THE DAYS
GAD7 TOTAL SCORE: 10
IF YOU CHECKED OFF ANY PROBLEMS ON THIS QUESTIONNAIRE, HOW DIFFICULT HAVE THESE PROBLEMS MADE IT FOR YOU TO DO YOUR WORK, TAKE CARE OF THINGS AT HOME, OR GET ALONG WITH OTHER PEOPLE: NOT DIFFICULT AT ALL
GAD7 TOTAL SCORE: 10
3. WORRYING TOO MUCH ABOUT DIFFERENT THINGS: SEVERAL DAYS

## 2023-12-18 ASSESSMENT — PATIENT HEALTH QUESTIONNAIRE - PHQ9
SUM OF ALL RESPONSES TO PHQ QUESTIONS 1-9: 1
5. POOR APPETITE OR OVEREATING: SEVERAL DAYS

## 2023-12-18 NOTE — PROGRESS NOTES
Tiffani is a 53 year old who is being evaluated via a billable video visit.      How would you like to obtain your AVS? MyChart  If the video visit is dropped, the invitation should be resent by: Text to cell phone: [phone]  Will anyone else be joining your video visit? No          Assessment & Plan   Problem List Items Addressed This Visit       JAXON (generalized anxiety disorder) - Primary    Relevant Medications    ALPRAZolam (XANAX) 0.25 MG tablet    sertraline (ZOLOFT) 100 MG tablet    MAIN (obstructive sleep apnea)    Morbid obesity with BMI of 40.0-44.9, adult (H)    Relevant Medications    propranolol (INDERAL) 20 MG tablet     Other Visit Diagnoses       Hyperlipidemia LDL goal <100        Relevant Medications    rosuvastatin (CRESTOR) 20 MG tablet    Colon cancer screening        Relevant Orders    Colonoscopy Screening  Referral    Low vitamin D level        Relevant Medications    vitamin D3 (CHOLECALCIFEROL) 50 mcg (2000 units) tablet    Post-menopausal        Relevant Orders    DX Hip/Pelvis/Spine    H/O gastric sleeve        Class 2 severe obesity with serious comorbidity and body mass index (BMI) of 35.0 to 35.9 in adult, unspecified obesity type (H)        Continue follow-up with weight management.  History of obstructive sleep apnea as a comorbidity?  Hypertension           Discussed multiple health concerns.    She has been taking Inderal 20 mg once daily in the morning with sertraline.  She thought was taking that for blood pressure.  Her blood pressure has been normal through.  This helps with her anxiety probably is helping with her migraine as well, she has not had any recurrent migraine Flares  She has not been taking the propranolol ER to 80 mg dose (though has refills).  She will stay on current dose of 20 mg if well-tolerated and on as needed dose.  She denies dizziness or side effects from medications.  She follows with weight management program.  Her vitamin D level was low  "and vitamin B12 was low.  She would need to be on replacement (SL B12) she had history of gastric sleeve surgery.  She is maintaining her weight loss and continues to gradually lose more weight.  Continue with exercise activities as tolerated.  Currently she is finding a flulike illness.  Also has some stressors ongoing exacerbating her anxiety.  Had death of her father from complication of MS back in October and her mom diagnosed with MM and she is following therapy.  Patient takes  Xanax only sparingly; used only 15 tablets since last May, no concern for any abuse or misuse of the medication ,discussed side effects.    Discussed doing a bone densitometry baseline given her history of gastric sleeve surgery ,low vitamin D levels.  She remains on statin rosuvastatin 20 mg once daily at bedtime ,well-tolerated.  Refill given for sertraline her JAXON-7 score was 10, she is tolerating medication without side effects ,we will keep on same dose.  She is still doing counseling through [facility] ,she does not want to follow-up with psychiatry at this point.  She has been getting a rash in her intertriginous areas ,she is applying nystatin.  Her insomnia is much is much better ,she is not taking mirtazapine.  She does have obstructive sleep apnea does not wear her CPAP for the last months, advised her to continue to wear CPAP daily ,we may consider referral follow-up to sleep medicine in the near future.  Reviewed all labs.  All questions answered.         BMI:   Estimated body mass index is 35.65 kg/m  as calculated from the following:    Height as of 12/14/23: 1.753 m (5' 9\").    Weight as of 12/14/23: 109.5 kg (241 lb 6.4 oz).   Weight management plan: Patient referred to endocrine and/or weight management specialty Discussed healthy diet and exercise guidelines    See Patient Instructions    Megan Phillips MD  [facility] ELA Nixon is a 53 year old, presenting for the following health " issues:  RECHECK         No data to display                HPI   DOWN 89 LBS , after surgery, recovery was rough,     4-5 months was feeling really good, was hoping, she has no high cholesterol  Has not been taking rosuvastatin,     Doing well, talks to wt loss clinic ,getting rashes in folds in skin    No migraines lately, sleeping really well.  Has CPAP, has not used for a month . In spring, wants to check if see if needs CPAP.     Has not needed sleep medicine, not used mirtazapine, has half of prescription,   Is out of alprazolam, only gets 15 at a time, from May, uses 2-3 a times a month, no side effects. Uses more for acute anxiety attack.,  lost father in October, had MS,   Mom has MM.      Weight is slowly down,     Had sleeve gastric surgery.         Review of Systems   Constitutional, HEENT, cardiovascular, pulmonary, gi and gu systems are negative, except as otherwise noted.      Objective           Vitals:  No vitals were obtained today due to virtual visit.    Physical Exam   GENERAL: Healthy, alert and no distress  EYES: Eyes grossly normal to inspection.  No discharge or erythema, or obvious scleral/conjunctival abnormalities.  RESP: No audible wheeze, cough, or visible cyanosis.  No visible retractions or increased work of breathing.    SKIN: Visible skin clear. No significant rash, abnormal pigmentation or lesions.  NEURO: Cranial nerves grossly intact.  Mentation and speech appropriate for age.  PSYCH: Mentation appears normal, affect normal/bright, judgement and insight intact, normal speech and appearance well-groomed.    Lab on 12/14/2023   Component Date Value Ref Range Status    Vitamin D, Total (25-Hydroxy) 12/14/2023 24  20 - 50 ng/mL Final    optimum levels    Parathyroid Hormone Intact 12/14/2023 42  15 - 65 pg/mL Final    Iron 12/14/2023 94  37 - 145 ug/dL Final    Iron Binding Capacity 12/14/2023 259  240 - 430 ug/dL Final    Iron Sat Index 12/14/2023 36  15 - 46 % Final    Ferritin  12/14/2023 69  11 - 328 ng/mL Final    Vitamin B12 12/14/2023 197 (L)  232 - 1,245 pg/mL Final    Vitamin B6 12/14/2023 14.4 (L)  20.0 - 125.0 nmol/L Final    INTERPRETIVE INFORMATION: Vitamin B6 (Pyridoxal 5-Phosphate)    Pyridoxal 5'-phosphate measured in a specimen collected   following an 8-hour or overnight fast accurately indicates   vitamin B6 nutritional status. Non-fasting specimen   concentration reflects recent vitamin intake.    This test was developed and its performance characteristics   determined by [facility]. It has not been cleared or   approved by the US Food and Drug Administration. This test   was performed in a CLIA certified laboratory and is   intended for clinical purposes.  Performed By: [facility]  [address] [place] [id number]  : Ricky Hutson MD, PhD  CLIA Number: [id number]    WBC Count 12/14/2023 4.6  4.0 - 11.0 10e3/uL Final    RBC Count 12/14/2023 4.67  3.80 - 5.20 10e6/uL Final    Hemoglobin 12/14/2023 14.4  11.7 - 15.7 g/dL Final    Hematocrit 12/14/2023 45.9  35.0 - 47.0 % Final    MCV 12/14/2023 98  78 - 100 fL Final    MCH 12/14/2023 30.8  26.5 - 33.0 pg Final    MCHC 12/14/2023 31.4 (L)  31.5 - 36.5 g/dL Final    RDW 12/14/2023 13.1  10.0 - 15.0 % Final    Platelet Count 12/14/2023 237  150 - 450 10e3/uL Final    Vitamin A 12/14/2023 0.47  0.30 - 1.20 mg/L Final    Retinol Palmitate 12/14/2023 0.02  0.00 - 0.10 mg/L Final    Vitamin A Interp 12/14/2023 Normal   Final      This test was developed and its performance characteristics   determined by [facility]. It has not been cleared or   approved by the US Food and Drug Administration. This test   was performed in a CLIA certified laboratory and is   intended for clinical purposes.  Performed By: [facility]  [address] [place] [id number]  : Ricky Hutson MD, PhD  CLIA Number: [id number]    Cholesterol 12/14/2023 241 (H)  <200  mg/dL Final    Triglycerides 12/14/2023 107  <150 mg/dL Final    Direct Measure HDL 12/14/2023 65  >=50 mg/dL Final    LDL Cholesterol Calculated 12/14/2023 155 (H)  <=100 mg/dL Final    Non HDL Cholesterol 12/14/2023 176 (H)  <130 mg/dL Final    Patient Fasting > 8hrs? 12/14/2023 Yes   Final    Sodium 12/14/2023 143  135 - 145 mmol/L Final    Reference intervals for this test were updated on 09/26/2023 to more accurately reflect our healthy population. There may be differences in the flagging of prior results with similar values performed with this method. Interpretation of those prior results can be made in the context of the updated reference intervals.     Potassium 12/14/2023 3.9  3.4 - 5.3 mmol/L Final    Carbon Dioxide (CO2) 12/14/2023 30 (H)  22 - 29 mmol/L Final    Anion Gap 12/14/2023 7  7 - 15 mmol/L Final    Urea Nitrogen 12/14/2023 11.6  6.0 - 20.0 mg/dL Final    Creatinine 12/14/2023 0.76  0.51 - 0.95 mg/dL Final    GFR Estimate 12/14/2023 >90  >60 mL/min/1.73m2 Final    Calcium 12/14/2023 9.8  8.6 - 10.0 mg/dL Final    Chloride 12/14/2023 106  98 - 107 mmol/L Final    Glucose 12/14/2023 90  70 - 99 mg/dL Final    Alkaline Phosphatase 12/14/2023 72  40 - 150 U/L Final    Reference intervals for this test were updated on 11/14/2023 to more accurately reflect our healthy population. There may be differences in the flagging of prior results with similar values performed with this method. Interpretation of those prior results can be made in the context of the updated reference intervals.    AST 12/14/2023 18  0 - 45 U/L Final    Reference intervals for this test were updated on 6/12/2023 to more accurately reflect our healthy population. There may be differences in the flagging of prior results with similar values performed with this method. Interpretation of those prior results can be made in the context of the updated reference intervals.    ALT 12/14/2023 13  0 - 50 U/L Final    Reference intervals for  this test were updated on 6/12/2023 to more accurately reflect our healthy population. There may be differences in the flagging of prior results with similar values performed with this method. Interpretation of those prior results can be made in the context of the updated reference intervals.      Protein Total 12/14/2023 7.2  6.4 - 8.3 g/dL Final    Albumin 12/14/2023 4.2  3.5 - 5.2 g/dL Final    Bilirubin Total 12/14/2023 0.3  <=1.2 mg/dL Final             Video-Visit Details    Type of service:  Video Visit   Video Start Time:  10:30 AM  Video End Time: 10:55 AM      Originating Location (pt. Location): Home    Distant Location (provider location):  On-site  Platform used for Video Visit: Specific Media

## 2023-12-19 LAB — VIT B1 PYROPHOSHATE BLD-SCNC: 119 NMOL/L

## 2023-12-20 DIAGNOSIS — K91.2 POSTSURGICAL MALABSORPTION: Primary | ICD-10-CM

## 2023-12-20 RX ORDER — PYRIDOXINE HCL (VITAMIN B6) 25 MG
25 TABLET ORAL DAILY
Qty: 14 TABLET | Refills: 0 | Status: SHIPPED | OUTPATIENT
Start: 2023-12-20

## 2024-01-23 DIAGNOSIS — F41.1 GAD (GENERALIZED ANXIETY DISORDER): ICD-10-CM

## 2024-01-23 RX ORDER — MIRTAZAPINE 15 MG/1
15 TABLET, FILM COATED ORAL AT BEDTIME
Qty: 30 TABLET | Refills: 0 | Status: SHIPPED | OUTPATIENT
Start: 2024-01-23 | End: 2024-02-22

## 2024-01-29 NOTE — TELEPHONE ENCOUNTER
30 day supply sent in.     No ans, left message to call clinic regarding a medication.      [mirtazapine (REMERON) 15 MG tablet ]        Maryana BOLAÑOS MA

## 2024-02-01 NOTE — TELEPHONE ENCOUNTER
Lvm for pt to call back. Pls vidal cohn pt in regards   [mirtazapine (REMERON) 15 MG tablet ] Thanks   Please clarify if patient is currently taking this medication.?Thank you for follow-up    Diann Humphreys CMA

## 2024-02-07 NOTE — TELEPHONE ENCOUNTER
Chika Nixon LM to call clinic and clarify if taking or not.     [mirtazapine (REMERON)    Refill was sent 1-23-24 to [place] [facility] but previously had been discontinued in December 12-18-23.      Maryana BOLAÑOS MA

## 2024-02-08 ENCOUNTER — TELEPHONE (OUTPATIENT)
Dept: FAMILY MEDICINE | Facility: CLINIC | Age: 54
End: 2024-02-08
Payer: COMMERCIAL

## 2024-02-08 NOTE — CONFIDENTIAL NOTE
Patient Quality Outreach    Patient is due for the following:   Cervical Cancer Screening - PAP Needed    Next Steps:   Schedule a office visit for Pap.      Type of outreach:    Sent [employer] message.      Questions for provider review:    None           Sammy Ascencio MA

## 2024-02-22 DIAGNOSIS — F41.1 GAD (GENERALIZED ANXIETY DISORDER): ICD-10-CM

## 2024-02-22 RX ORDER — MIRTAZAPINE 15 MG/1
15 TABLET, FILM COATED ORAL AT BEDTIME
Qty: 30 TABLET | Refills: 0 | Status: SHIPPED | OUTPATIENT
Start: 2024-02-22

## 2024-02-29 ENCOUNTER — TELEPHONE (OUTPATIENT)
Dept: FAMILY MEDICINE | Facility: CLINIC | Age: 54
End: 2024-02-29
Payer: COMMERCIAL

## 2024-02-29 NOTE — CONFIDENTIAL NOTE
Patient Quality Outreach    Patient is due for the following:   Cervical Cancer Screening - PAP Needed  Physical Preventive Adult Physical    Next Steps:   Schedule a Adult Preventative    Type of outreach:    Sent [employer] message.      Questions for provider review:    None           Sammy Ascencio MA

## 2024-07-09 ENCOUNTER — MYC REFILL (OUTPATIENT)
Dept: FAMILY MEDICINE | Facility: CLINIC | Age: 54
End: 2024-07-09
Payer: COMMERCIAL

## 2024-07-09 DIAGNOSIS — I10 ESSENTIAL HYPERTENSION: ICD-10-CM

## 2024-07-09 DIAGNOSIS — F41.1 GAD (GENERALIZED ANXIETY DISORDER): ICD-10-CM

## 2024-07-09 DIAGNOSIS — R79.89 LOW VITAMIN D LEVEL: ICD-10-CM

## 2024-07-09 DIAGNOSIS — E66.01 MORBID OBESITY WITH BMI OF 40.0-44.9, ADULT (H): ICD-10-CM

## 2024-07-09 RX ORDER — CHOLECALCIFEROL (VITAMIN D3) 50 MCG
1 TABLET ORAL 2 TIMES DAILY
Qty: 180 TABLET | Refills: 3 | OUTPATIENT
Start: 2024-07-09

## 2024-07-10 ENCOUNTER — NURSE TRIAGE (OUTPATIENT)
Dept: FAMILY MEDICINE | Facility: CLINIC | Age: 54
End: 2024-07-10
Payer: COMMERCIAL

## 2024-07-10 DIAGNOSIS — I10 ESSENTIAL HYPERTENSION: ICD-10-CM

## 2024-07-10 RX ORDER — PROPRANOLOL HYDROCHLORIDE 80 MG/1
80 CAPSULE, EXTENDED RELEASE ORAL DAILY
Qty: 90 CAPSULE | Refills: 0 | Status: SHIPPED | OUTPATIENT
Start: 2024-07-10

## 2024-07-10 RX ORDER — MIRTAZAPINE 15 MG/1
15 TABLET, FILM COATED ORAL AT BEDTIME
Qty: 30 TABLET | Refills: 0 | OUTPATIENT
Start: 2024-07-10

## 2024-07-10 RX ORDER — ALPRAZOLAM 0.25 MG
0.25 TABLET ORAL DAILY PRN
Qty: 15 TABLET | Refills: 0 | Status: SHIPPED | OUTPATIENT
Start: 2024-07-10

## 2024-07-10 RX ORDER — PROPRANOLOL HYDROCHLORIDE 20 MG/1
20 TABLET ORAL DAILY PRN
Qty: 90 TABLET | Refills: 1 | OUTPATIENT
Start: 2024-07-10

## 2024-07-10 RX ORDER — PROPRANOLOL HYDROCHLORIDE 80 MG/1
80 CAPSULE, EXTENDED RELEASE ORAL DAILY
Qty: 90 CAPSULE | Refills: 0 | Status: CANCELLED | OUTPATIENT
Start: 2024-07-10

## 2024-07-10 RX ORDER — SERTRALINE HYDROCHLORIDE 100 MG/1
150 TABLET, FILM COATED ORAL DAILY
Qty: 135 TABLET | Refills: 0 | Status: SHIPPED | OUTPATIENT
Start: 2024-07-10

## 2024-07-10 NOTE — TELEPHONE ENCOUNTER
"Nurse Triage SBAR    Is this a 2nd Level Triage? YES, LICENSED PRACTITIONER REVIEW IS REQUIRED    Situation: Pt reports /110 this morning. Pt reports being out of mediation propanolol ER 80mg tablet.Pt does report having a headache. Pt denies chest pain, difficult breathing, blurry vision.  Pt was given 20mg dose from pharmacy and advised to go to ED or get medication. Pt requesting refill be sent in.    Background: Hyper tension. Has been out of medication since 7/3/24    Assessment: Hypertension    Protocol Recommended Disposition:   Go To Office Now    Recommendation: Pt is out of town so unable to come to clinic. Please send in medication and advise on next steps.     Routed to provider    Does the patient meet one of the following criteria for ADS visit consideration? 16+ years old, with an [facility] PCP     TIP  Providers, please consider if this condition is appropriate for management at one of our Acute and Diagnostic Services sites.     If patient is a good candidate, please use dotphrase <dot>triageresponse and select Refer to ADS to document.     Reason for Disposition   Systolic BP >= 200 OR Diastolic >= 120 and having NO cardiac or neurologic symptoms    Additional Information   Negative: Patient sounds very sick or weak to the triager   Negative: Systolic BP >= 160 OR Diastolic >= 100, and any cardiac (e.g., breathing difficulty, chest pain) or neurologic symptoms (e.g., new-onset blurred or double vision)   Negative: Pregnant 20 or more weeks (or postpartum < 6 weeks) with new hand or face swelling   Negative: Pregnant 20 or more weeks (or postpartum < 6 weeks) and Systolic BP >= 160 OR Diastolic >= 110   Negative: Sounds like a life-threatening emergency to the triager   Negative: Symptom is main concern (e.g., headache, chest pain)   Negative: Low blood pressure is main concern    Answer Assessment - Initial Assessment Questions  1. BLOOD PRESSURE: \"What is the blood pressure?\" \"Did you take at " "least two measurements 5 minutes apart?\"      212/110    2. ONSET: \"When did you take your blood pressure?\"      7/3/24     3. HOW: \"How did you take your blood pressure?\" (e.g., automatic home BP monitor, visiting nurse)      Home cuff     4. HISTORY: \"Do you have a history of high blood pressure?\"      Yes   5. MEDICINES: \"Are you taking any medicines for blood pressure?\" \"Have you missed any doses recently?\"      Missed due to being out on 7/3/24    6. OTHER SYMPTOMS: \"Do you have any symptoms?\" (e.g., blurred vision, chest pain, difficulty breathing, headache, weakness)      Headache-\"debilitating, cold sweats\"  7. PREGNANCY: \"Is there any chance you are pregnant?\" \"When was your last menstrual period?\"      No    Protocols used: Blood Pressure - High-A-OH    "

## 2024-07-10 NOTE — TELEPHONE ENCOUNTER
Writer contacted patient and relayed provider message below~  Patient expressed understanding.    1) Pt. Agrees to monitor bps and provide update    2) Patient states that she is taking mirtazapine PRN for sleep aide--has been the only aide that has been effective.   -Patient continues to take sertraline daily    3) Patient is in [place] at this time caring for mother.   -Does not have return date at this time  -Would schedule a VV if you allow

## 2024-07-11 NOTE — TELEPHONE ENCOUNTER
Megan Phillips MD Begin, David A RN; [facility]12 hours ago (7:56 PM)     [place]  Cannot schedule a virtual visit out of state as per policy.  Thank you     Pt was called with message above and asked that she seek care locally if still having symptoms.

## 2024-07-14 ENCOUNTER — HEALTH MAINTENANCE LETTER (OUTPATIENT)
Age: 54
End: 2024-07-14

## 2024-09-13 ENCOUNTER — PATIENT OUTREACH (OUTPATIENT)
Dept: CARE COORDINATION | Facility: CLINIC | Age: 54
End: 2024-09-13
Payer: COMMERCIAL

## 2024-10-03 ENCOUNTER — DOCUMENTATION ONLY (OUTPATIENT)
Dept: SURGERY | Facility: CLINIC | Age: 54
End: 2024-10-03
Payer: COMMERCIAL

## 2024-10-11 ENCOUNTER — PATIENT OUTREACH (OUTPATIENT)
Dept: CARE COORDINATION | Facility: CLINIC | Age: 54
End: 2024-10-11
Payer: COMMERCIAL

## 2025-01-07 ENCOUNTER — MYC REFILL (OUTPATIENT)
Dept: SURGERY | Facility: CLINIC | Age: 55
End: 2025-01-07
Payer: COMMERCIAL

## 2025-01-07 ENCOUNTER — MYC REFILL (OUTPATIENT)
Dept: FAMILY MEDICINE | Facility: CLINIC | Age: 55
End: 2025-01-07
Payer: COMMERCIAL

## 2025-01-07 DIAGNOSIS — I10 ESSENTIAL HYPERTENSION: ICD-10-CM

## 2025-01-07 DIAGNOSIS — F41.1 GAD (GENERALIZED ANXIETY DISORDER): ICD-10-CM

## 2025-01-07 DIAGNOSIS — K91.2 POSTSURGICAL MALABSORPTION: ICD-10-CM

## 2025-01-07 RX ORDER — PYRIDOXINE HCL (VITAMIN B6) 25 MG
25 TABLET ORAL DAILY
Qty: 14 TABLET | Refills: 0 | OUTPATIENT
Start: 2025-01-07

## 2025-01-08 RX ORDER — MIRTAZAPINE 15 MG/1
15 TABLET, FILM COATED ORAL AT BEDTIME
Qty: 30 TABLET | Refills: 0 | Status: SHIPPED | OUTPATIENT
Start: 2025-01-08

## 2025-01-08 RX ORDER — ALPRAZOLAM 0.25 MG/1
TABLET ORAL
Qty: 15 TABLET | Refills: 0 | Status: SHIPPED | OUTPATIENT
Start: 2025-01-08

## 2025-01-08 RX ORDER — ALPRAZOLAM 0.25 MG/1
0.25 TABLET ORAL DAILY PRN
Qty: 15 TABLET | Refills: 0 | OUTPATIENT
Start: 2025-01-08

## 2025-01-08 RX ORDER — SERTRALINE HYDROCHLORIDE 100 MG/1
150 TABLET, FILM COATED ORAL DAILY
Qty: 90 TABLET | Refills: 0 | Status: SHIPPED | OUTPATIENT
Start: 2025-01-08

## 2025-01-08 RX ORDER — PROPRANOLOL HYDROCHLORIDE 80 MG/1
80 CAPSULE, EXTENDED RELEASE ORAL DAILY
Qty: 60 CAPSULE | Refills: 0 | Status: SHIPPED | OUTPATIENT
Start: 2025-01-08

## 2025-01-12 ENCOUNTER — HEALTH MAINTENANCE LETTER (OUTPATIENT)
Age: 55
End: 2025-01-12

## 2025-01-13 ENCOUNTER — VIRTUAL VISIT (OUTPATIENT)
Dept: FAMILY MEDICINE | Facility: CLINIC | Age: 55
End: 2025-01-13
Payer: COMMERCIAL

## 2025-01-13 DIAGNOSIS — Z12.11 COLON CANCER SCREENING: ICD-10-CM

## 2025-01-13 DIAGNOSIS — Z13.1 SCREENING FOR DIABETES MELLITUS: Primary | ICD-10-CM

## 2025-01-13 DIAGNOSIS — I10 ESSENTIAL HYPERTENSION: ICD-10-CM

## 2025-01-13 DIAGNOSIS — Z90.3 H/O GASTRIC SLEEVE: ICD-10-CM

## 2025-01-13 DIAGNOSIS — E78.5 DYSLIPIDEMIA: ICD-10-CM

## 2025-01-13 DIAGNOSIS — G47.00 INSOMNIA, UNSPECIFIED TYPE: ICD-10-CM

## 2025-01-13 DIAGNOSIS — Z12.31 VISIT FOR SCREENING MAMMOGRAM: ICD-10-CM

## 2025-01-13 DIAGNOSIS — F41.1 GAD (GENERALIZED ANXIETY DISORDER): ICD-10-CM

## 2025-01-13 PROBLEM — E66.01 MORBID OBESITY WITH BMI OF 40.0-44.9, ADULT (H): Status: RESOLVED | Noted: 2023-04-26 | Resolved: 2025-01-13

## 2025-01-13 PROBLEM — E66.01 MORBID OBESITY (H): Status: RESOLVED | Noted: 2019-04-11 | Resolved: 2025-01-13

## 2025-01-13 PROCEDURE — 98005 SYNCH AUDIO-VIDEO EST LOW 20: CPT | Performed by: INTERNAL MEDICINE

## 2025-01-13 ASSESSMENT — PATIENT HEALTH QUESTIONNAIRE - PHQ9
10. IF YOU CHECKED OFF ANY PROBLEMS, HOW DIFFICULT HAVE THESE PROBLEMS MADE IT FOR YOU TO DO YOUR WORK, TAKE CARE OF THINGS AT HOME, OR GET ALONG WITH OTHER PEOPLE: SOMEWHAT DIFFICULT
SUM OF ALL RESPONSES TO PHQ QUESTIONS 1-9: 8
SUM OF ALL RESPONSES TO PHQ QUESTIONS 1-9: 8

## 2025-01-13 NOTE — PROGRESS NOTES
Tiffani is a 54 year old who is being evaluated via a billable video visit.    How would you like to obtain your AVS? MyChart  If the video visit is dropped, the invitation should be resent by: Text to cell phone: [phone]  Will anyone else be joining your video visit? No      Assessment & Plan   Problem List Items Addressed This Visit       JAXON (generalized anxiety disorder)    Insomnia, unspecified type    Essential hypertension    Relevant Orders    Comprehensive metabolic panel (BMP + Alb, Alk Phos, ALT, AST, Total. Bili, TP)    Albumin Random Urine Quantitative with Creat Ratio    H/O gastric sleeve     Other Visit Diagnoses       Screening for diabetes mellitus    -  Primary    Relevant Orders    Hemoglobin A1c    Dyslipidemia        Relevant Orders    Lipid panel reflex to direct LDL Fasting    Visit for screening mammogram        Relevant Orders    MA Screen Bilateral w/Percy    Colon cancer screening        Relevant Orders    Colonoscopy Screening  Referral             Discussed medication side effects when she takes mirtazapine to cut down sertraline 200 mg risk of QT prolongation.  Will continue to refill medications Xanax as needed and use only for acute flares of panic attacks stress.  She is undergoing a lot of stressors taking care of her health of her mom suffers from multiple myeloma and heart failure.  Her dad passed away last year from MS.  Patient was supposed to follow-up with EMG testing as well for some paresthesia.  Patient also due for repeat labs as per bariatric surgery recommendation including B12 iron calcium we added lipid A1c and chemistry panel.  Advised patient to monitor blood pressure and call us with blood pressure readings.  Advised to be compliant with propranolol and take daily due to risk of rebound headaches blood pressure etc.  Congratulated patient on maintaining weight loss he is down to 239 pounds from 319 pounds.  She reports her sleep apnea is better.  She  is due for mammogram at colonoscopy she agrees to do colonoscopy.  All questions answered     See Patient Instructions      Lindsey Nixon is a 54 year old, presenting for the following health issues:  Recheck Medication         No data to display                Via the Health Maintenance questionnaire, the patient has reported the following services have been completed -Mammogram: Edgardo 2023-04-26, this information has not been sent to the abstraction team.  Video Start Time:  10:03 AM    HPI     Patient presenting for follow-up.  She goes between [place] and [place] her mother suffering from multiple myeloma and heart failure.  Patient uses mirtazapine rarely she has used 3 over the last year to use it to help her stay asleep hydroxyzine helps her to fall asleep but has not been using.  Her migraines headaches are much better.  She has not been checking her blood pressure she takes propranolol but sometimes misses the doses 8 mg extended release when she takes that for headache as well.  She reports her blood pressure is good usually.  She had bariatric surgery with gastric sleeve April 23, 2023 and she is down to 239 from 319 pounds and she is maintaining her weight loss.  She has not had sleep apnea problems since she lost weight.  She is due for repeat labs per bariatric surgery.  She also has some right hip strain at times she had fell 3 years ago she had seen sleep sports medicine also was advised to do EMG she had not pursued.  She using Xanax and she is 45 times over the last year for acute panic attacks.  Denies side effect from medication she gets 15 tablets at a time she refill 2-3 times last year she uses when the days are so stressful.    Review of Systems  Constitutional, neuro, ENT, endocrine, pulmonary, cardiac, gastrointestinal, genitourinary, musculoskeletal, integument and psychiatric systems are negative, except as otherwise noted.      Objective           Vitals:  No vitals were  obtained today due to virtual visit.    Physical Exam   GENERAL: alert and no distress  EYES: Eyes grossly normal to inspection.  No discharge or erythema, or obvious scleral/conjunctival abnormalities.  RESP: No audible wheeze, cough, or visible cyanosis.    SKIN: Visible skin clear. No significant rash, abnormal pigmentation or lesions.  NEURO: Cranial nerves grossly intact.  Mentation and speech appropriate for age.  PSYCH: Appropriate affect, tone, and pace of words    Lab on 12/14/2023   Component Date Value Ref Range Status    Vitamin D, Total (25-Hydroxy) 12/14/2023 24  20 - 50 ng/mL Final    optimum levels    Parathyroid Hormone Intact 12/14/2023 42  15 - 65 pg/mL Final    Iron 12/14/2023 94  37 - 145 ug/dL Final    Iron Binding Capacity 12/14/2023 259  240 - 430 ug/dL Final    Iron Sat Index 12/14/2023 36  15 - 46 % Final    Ferritin 12/14/2023 69  11 - 328 ng/mL Final    Vitamin B12 12/14/2023 197 (L)  232 - 1,245 pg/mL Final    Vitamin B1 Whole Blood Level 12/14/2023 119  70 - 180 nmol/L Final    INTERPRETIVE INFORMATION: Vitamin B1, Whole Blood    This assay measures the concentration of thiamine   diphosphate (TDP), the primary active form of vitamin B1.   Approximately 90 percent of vitamin B1 present in whole   blood is TDP. Thiamine and thiamine monophosphate, which   comprise the remaining 10 percent, are not measured.    This test was developed and its performance characteristics   determined by [facility]. It has not been cleared or   approved by the [place] Food and Drug Administration. This test   was performed in a CLIA certified laboratory and is   intended for clinical purposes.  Performed By: [facility]  [address] [place] [id number]  : Ricky Hutson MD, PhD  CLIA Number: [id number]    Vitamin B6 12/14/2023 14.4 (L)  20.0 - 125.0 nmol/L Final    INTERPRETIVE INFORMATION: Vitamin B6 (Pyridoxal 5-Phosphate)    Pyridoxal 5'-phosphate measured in a  specimen collected   following an 8-hour or overnight fast accurately indicates   vitamin B6 nutritional status. Non-fasting specimen   concentration reflects recent vitamin intake.    This test was developed and its performance characteristics   determined by [facility]. It has not been cleared or   approved by the [place] Food and Drug Administration. This test   was performed in a CLIA certified laboratory and is   intended for clinical purposes.  Performed By: [facility]  [address] [place], [place] [id number]  : Ricky Hutson MD, PhD  CLIA Number: [id number]    WBC Count 12/14/2023 4.6  4.0 - 11.0 10e3/uL Final    RBC Count 12/14/2023 4.67  3.80 - 5.20 10e6/uL Final    Hemoglobin 12/14/2023 14.4  11.7 - 15.7 g/dL Final    Hematocrit 12/14/2023 45.9  35.0 - 47.0 % Final    MCV 12/14/2023 98  78 - 100 fL Final    MCH 12/14/2023 30.8  26.5 - 33.0 pg Final    MCHC 12/14/2023 31.4 (L)  31.5 - 36.5 g/dL Final    RDW 12/14/2023 13.1  10.0 - 15.0 % Final    Platelet Count 12/14/2023 237  150 - 450 10e3/uL Final    Vitamin A 12/14/2023 0.47  0.30 - 1.20 mg/L Final    Retinol Palmitate 12/14/2023 0.02  0.00 - 0.10 mg/L Final    Vitamin A Interp 12/14/2023 Normal   Final      This test was developed and its performance characteristics   determined by [facility]. It has not been cleared or   approved by the US Food and Drug Administration. This test   was performed in a CLIA certified laboratory and is   intended for clinical purposes.  Performed By: [facility]  [address] [place] [id number]  : Ricky Hutson MD, PhD  CLIA Number: [id number]    Cholesterol 12/14/2023 241 (H)  <200 mg/dL Final    Triglycerides 12/14/2023 107  <150 mg/dL Final    Direct Measure HDL 12/14/2023 65  >=50 mg/dL Final    LDL Cholesterol Calculated 12/14/2023 155 (H)  <=100 mg/dL Final    Non HDL Cholesterol 12/14/2023 176 (H)  <130 mg/dL Final    Patient  Fasting > 8hrs? 12/14/2023 Yes   Final    Sodium 12/14/2023 143  135 - 145 mmol/L Final    Reference intervals for this test were updated on 09/26/2023 to more accurately reflect our healthy population. There may be differences in the flagging of prior results with similar values performed with this method. Interpretation of those prior results can be made in the context of the updated reference intervals.     Potassium 12/14/2023 3.9  3.4 - 5.3 mmol/L Final    Carbon Dioxide (CO2) 12/14/2023 30 (H)  22 - 29 mmol/L Final    Anion Gap 12/14/2023 7  7 - 15 mmol/L Final    Urea Nitrogen 12/14/2023 11.6  6.0 - 20.0 mg/dL Final    Creatinine 12/14/2023 0.76  0.51 - 0.95 mg/dL Final    GFR Estimate 12/14/2023 >90  >60 mL/min/1.73m2 Final    Calcium 12/14/2023 9.8  8.6 - 10.0 mg/dL Final    Chloride 12/14/2023 106  98 - 107 mmol/L Final    Glucose 12/14/2023 90  70 - 99 mg/dL Final    Alkaline Phosphatase 12/14/2023 72  40 - 150 U/L Final    Reference intervals for this test were updated on 11/14/2023 to more accurately reflect our healthy population. There may be differences in the flagging of prior results with similar values performed with this method. Interpretation of those prior results can be made in the context of the updated reference intervals.    AST 12/14/2023 18  0 - 45 U/L Final    Reference intervals for this test were updated on 6/12/2023 to more accurately reflect our healthy population. There may be differences in the flagging of prior results with similar values performed with this method. Interpretation of those prior results can be made in the context of the updated reference intervals.    ALT 12/14/2023 13  0 - 50 U/L Final    Reference intervals for this test were updated on 6/12/2023 to more accurately reflect our healthy population. There may be differences in the flagging of prior results with similar values performed with this method. Interpretation of those prior results can be made in the  context of the updated reference intervals.      Protein Total 12/14/2023 7.2  6.4 - 8.3 g/dL Final    Albumin 12/14/2023 4.2  3.5 - 5.2 g/dL Final    Bilirubin Total 12/14/2023 0.3  <=1.2 mg/dL Final         Video-Visit Details    Type of service:  Video Visit   Video End Time: 10:23 AM  Originating Location (pt. Location): Home    Distant Location (provider location):  On-site  Platform used for Video Visit: Kenna  Signed Electronically by: Megan Phillips MD

## 2025-01-14 ENCOUNTER — PATIENT OUTREACH (OUTPATIENT)
Dept: CARE COORDINATION | Facility: CLINIC | Age: 55
End: 2025-01-14
Payer: COMMERCIAL

## 2025-05-05 ENCOUNTER — MYC REFILL (OUTPATIENT)
Dept: FAMILY MEDICINE | Facility: CLINIC | Age: 55
End: 2025-05-05
Payer: COMMERCIAL

## 2025-05-05 DIAGNOSIS — F41.1 GAD (GENERALIZED ANXIETY DISORDER): ICD-10-CM

## 2025-05-05 DIAGNOSIS — I10 ESSENTIAL HYPERTENSION: ICD-10-CM

## 2025-05-05 RX ORDER — ALPRAZOLAM 0.25 MG
0.25 TABLET ORAL
Qty: 15 TABLET | OUTPATIENT
Start: 2025-05-05

## 2025-05-05 RX ORDER — MIRTAZAPINE 15 MG/1
15 TABLET, FILM COATED ORAL AT BEDTIME
Qty: 30 TABLET | Refills: 0 | OUTPATIENT
Start: 2025-05-05

## 2025-05-05 RX ORDER — PROPRANOLOL HYDROCHLORIDE 80 MG/1
80 CAPSULE, EXTENDED RELEASE ORAL DAILY
Qty: 60 CAPSULE | Refills: 0 | OUTPATIENT
Start: 2025-05-05

## 2025-05-06 DIAGNOSIS — I10 ESSENTIAL HYPERTENSION: ICD-10-CM

## 2025-05-06 RX ORDER — ALPRAZOLAM 0.25 MG
0.25 TABLET ORAL
Qty: 10 TABLET | Refills: 0 | Status: SHIPPED | OUTPATIENT
Start: 2025-05-06

## 2025-05-06 RX ORDER — MIRTAZAPINE 15 MG/1
15 TABLET, FILM COATED ORAL AT BEDTIME
Qty: 30 TABLET | Refills: 0 | Status: SHIPPED | OUTPATIENT
Start: 2025-05-06

## 2025-05-06 RX ORDER — PROPRANOLOL HYDROCHLORIDE 80 MG/1
80 CAPSULE, EXTENDED RELEASE ORAL DAILY
Qty: 45 CAPSULE | Refills: 0 | Status: SHIPPED | OUTPATIENT
Start: 2025-05-06

## 2025-05-06 RX ORDER — PROPRANOLOL HYDROCHLORIDE 80 MG/1
80 CAPSULE, EXTENDED RELEASE ORAL DAILY
Qty: 90 CAPSULE | OUTPATIENT
Start: 2025-05-06

## 2025-05-06 NOTE — TELEPHONE ENCOUNTER
Please schedule office visit wellness and labs.    Please clarify if patient is taking both mirtazapine and sertraline.    Please advise if patient is taking propranolol 80 mg.  This can cause bradycardia.    The labs are required, last checked in December 2023

## 2025-05-06 NOTE — TELEPHONE ENCOUNTER
Called patient and relayed provider's message.     She stated that she takes the sertraline daily and takes the mirtazapine very sparing (12-13 tablets in the last 4 months). She stated understanding with the propranolol and stated that she has not had this side effect.     She stated that she will schedule the appointment next week. She had no further questions at this time.     Jessica LOMELI RN  [facility] Triage Team

## 2025-07-19 ENCOUNTER — HEALTH MAINTENANCE LETTER (OUTPATIENT)
Age: 55
End: 2025-07-19

## (undated) DEVICE — SEE SHARP

## (undated) DEVICE — SU MONOCRYL 4-0 PS-2 27" UND Y426H

## (undated) DEVICE — ESU GROUND PAD ADULT W/CORD E7507

## (undated) DEVICE — GLOVE PROTEXIS POWDER FREE SMT 7.5  2D72PT75X

## (undated) DEVICE — ESU PENCIL W/HOLSTER E2350H

## (undated) DEVICE — SOL WATER IRRIG 1000ML BOTTLE 2F7114

## (undated) DEVICE — ESU HOLDER LAP INST DISP PURPLE LONG 330MM H-PRO-330

## (undated) DEVICE — GLOVE BIOGEL PI MICRO INDICATOR UNDERGLOVE SZ 7.5 48975

## (undated) DEVICE — SOL NACL 0.9% IRRIG 3000ML BAG 2B7477

## (undated) DEVICE — PACK LAP HYST RIDGES

## (undated) DEVICE — NDL SPINAL 20GA 3.5" 405182

## (undated) DEVICE — SUCTION IRR STRYKERFLOW II W/TIP 250-070-520

## (undated) DEVICE — RETR ELEV / UTERINE MANIPULATOR V-CARE MED CUP 60-6085-201A

## (undated) DEVICE — SU VICRYL 0 CT-1 36" J346H

## (undated) DEVICE — DECANTER VIAL 2006S

## (undated) DEVICE — DRAPE BREAST/CHEST 29420

## (undated) DEVICE — GOWN XLG DISP 9545

## (undated) DEVICE — SUCTION CANISTER MEDIVAC LINER 3000ML W/LID 65651-530

## (undated) DEVICE — PROTECTOR ARM ONE-STEP TRENDELENBURG 40418

## (undated) DEVICE — GASTRECTOMY SLEEVE STABILIZATION SYSTEM VISIGI 3D 36FR 5236B

## (undated) DEVICE — ENDO TROCAR FIRST ENTRY KII FIOS Z-THRD 12X100MM CTF73

## (undated) DEVICE — SU VICRYL 0 TIE 12X18" J906G

## (undated) DEVICE — ENDO SCOPE WARMER LF TM500

## (undated) DEVICE — Device

## (undated) DEVICE — LINEN FULL SHEET 5511

## (undated) DEVICE — SYR 30ML LL W/O NDL 302832

## (undated) DEVICE — STPL RELOAD REG TISSUE ECHELON 60 X 3.6MM BLUE GST60B

## (undated) DEVICE — TROCAR TRIPORT PLUS OLYMPUS SINGLE ACCESS WA58010T

## (undated) DEVICE — DRSG BANDAID 1X3" FABRIC CURITY LATEX FREE KC44101

## (undated) DEVICE — LINEN ORTHO ACL PACK 5447

## (undated) DEVICE — PREP POVIDONE IODINE SOLUTION 10% 4OZ

## (undated) DEVICE — LINEN DRAPE 54X72" 5467

## (undated) DEVICE — TROCAR TRIPORT 15 OLYMPUS SINGLE ACCESS WA58015T

## (undated) DEVICE — DRAPE LEGGINGS 8421

## (undated) DEVICE — PACK LAP CHOLE SLC15LCFSD

## (undated) DEVICE — GLOVE PROTEXIS W/NEU-THERA 7.0  2D73TE70

## (undated) DEVICE — ESU HANDPIECE BIPOLAR THUNDERBEAT FC 5MMX35CM TB-0535FC

## (undated) DEVICE — MORCELLATOR LAPAROSCOPIC LINA XCISE MOR-1515-6

## (undated) DEVICE — DRSG TEGADERM 4X10" 1627

## (undated) DEVICE — BLADE KNIFE SURG 10 371110

## (undated) DEVICE — ENDO TROCAR FIRST ENTRY KII FIOS Z-THRD 05X100MM CTF03

## (undated) DEVICE — DRAPE IOBAN INCISE 23X17" 6650EZ

## (undated) DEVICE — ESU LIGASURE SEALER/DIVIDER RETRACT L-HOOK 37CM LF5637

## (undated) DEVICE — ENDO MORCELLATOR OLYMPUS PNEUMOLINER WA90500US

## (undated) DEVICE — KIT PATIENT POSITIONING PIGAZZI LATEX FREE 40580

## (undated) DEVICE — ENDO APPLICATOR ARISTA FLEX TIP AM0005

## (undated) DEVICE — SOL NACL 0.9% IRRIG 1000ML BOTTLE 2F7124

## (undated) DEVICE — APPLICATOR VISTASEAL RIGID TIP 35CM VSTL35

## (undated) DEVICE — SU MONOCRYL 4-0 PS-2 18" UND Y496G

## (undated) DEVICE — PREP CHLORAPREP 26ML TINTED HI-LITE ORANGE 930815

## (undated) DEVICE — BAG CLEAR TRASH 1.3M 39X33" P4040C

## (undated) DEVICE — GLOVE PROTEXIS BLUE W/NEU-THERA 7.0  2D73EB70

## (undated) DEVICE — HEMOSTAT ABSORBABLE ARISTA 5GM SM0007-USA

## (undated) DEVICE — STPL LINE REINFORCEMENT 60MM ECHELON ECH60R

## (undated) DEVICE — DECANTER BAG 2002S

## (undated) DEVICE — EVAC SYSTEM CLEAR FLOW SC082500

## (undated) DEVICE — ENDO TROCAR SLEEVE KII Z-THREADED 12X100MM CTS22

## (undated) DEVICE — LINEN POUCH DBL 5427

## (undated) DEVICE — LINEN TOWEL PACK X5 5464

## (undated) DEVICE — ESU GROUND PAD UNIVERSAL W/O CORD

## (undated) DEVICE — SPONGE LAP 18X18" X8435

## (undated) DEVICE — PAD CHUX UNDERPAD 30X36" P3036C

## (undated) DEVICE — RX VISTASEAL FIBRIN SEALANT W/THROMBIN 10ML VST10

## (undated) DEVICE — SU VICRYL 0 UR-6 27" J603H

## (undated) DEVICE — TUBING C02 INSUFFLATION LAP FILTER HEATER 6198

## (undated) DEVICE — STPL POWERED ECHELON 60MM PSEE60A

## (undated) DEVICE — GLOVE BIOGEL PI MICRO SZ 7.5 48575

## (undated) DEVICE — SYR 50ML CATH TIP W/O NDL 309620

## (undated) RX ORDER — BUPIVACAINE HYDROCHLORIDE AND EPINEPHRINE 5; 5 MG/ML; UG/ML
INJECTION, SOLUTION EPIDURAL; INTRACAUDAL; PERINEURAL
Status: DISPENSED
Start: 2019-04-23

## (undated) RX ORDER — PROPOFOL 10 MG/ML
INJECTION, EMULSION INTRAVENOUS
Status: DISPENSED
Start: 2019-04-23

## (undated) RX ORDER — FENTANYL CITRATE 50 UG/ML
INJECTION, SOLUTION INTRAMUSCULAR; INTRAVENOUS
Status: DISPENSED
Start: 2023-04-26

## (undated) RX ORDER — REGADENOSON 0.08 MG/ML
INJECTION, SOLUTION INTRAVENOUS
Status: DISPENSED
Start: 2022-05-16

## (undated) RX ORDER — GLYCOPYRROLATE 0.2 MG/ML
INJECTION INTRAMUSCULAR; INTRAVENOUS
Status: DISPENSED
Start: 2019-04-23

## (undated) RX ORDER — PHENAZOPYRIDINE HYDROCHLORIDE 200 MG/1
TABLET, FILM COATED ORAL
Status: DISPENSED
Start: 2019-04-23

## (undated) RX ORDER — ACETAMINOPHEN 325 MG/1
TABLET ORAL
Status: DISPENSED
Start: 2023-04-26

## (undated) RX ORDER — CEFAZOLIN SODIUM 1 G/3ML
INJECTION, POWDER, FOR SOLUTION INTRAMUSCULAR; INTRAVENOUS
Status: DISPENSED
Start: 2019-04-23

## (undated) RX ORDER — PROPOFOL 10 MG/ML
INJECTION, EMULSION INTRAVENOUS
Status: DISPENSED
Start: 2023-04-26

## (undated) RX ORDER — LABETALOL 20 MG/4 ML (5 MG/ML) INTRAVENOUS SYRINGE
Status: DISPENSED
Start: 2019-04-23

## (undated) RX ORDER — FENTANYL CITRATE 50 UG/ML
INJECTION, SOLUTION INTRAMUSCULAR; INTRAVENOUS
Status: DISPENSED
Start: 2019-04-23

## (undated) RX ORDER — ONDANSETRON 2 MG/ML
INJECTION INTRAMUSCULAR; INTRAVENOUS
Status: DISPENSED
Start: 2019-04-23

## (undated) RX ORDER — MINERAL OIL
OIL (ML) MISCELLANEOUS
Status: DISPENSED
Start: 2019-04-23

## (undated) RX ORDER — ALBUTEROL SULFATE 90 UG/1
AEROSOL, METERED RESPIRATORY (INHALATION)
Status: DISPENSED
Start: 2023-04-26

## (undated) RX ORDER — LIDOCAINE HYDROCHLORIDE 10 MG/ML
INJECTION, SOLUTION EPIDURAL; INFILTRATION; INTRACAUDAL; PERINEURAL
Status: DISPENSED
Start: 2022-05-13

## (undated) RX ORDER — DEXAMETHASONE SODIUM PHOSPHATE 4 MG/ML
INJECTION, SOLUTION INTRA-ARTICULAR; INTRALESIONAL; INTRAMUSCULAR; INTRAVENOUS; SOFT TISSUE
Status: DISPENSED
Start: 2023-04-26

## (undated) RX ORDER — HYDROMORPHONE HYDROCHLORIDE 1 MG/ML
INJECTION, SOLUTION INTRAMUSCULAR; INTRAVENOUS; SUBCUTANEOUS
Status: DISPENSED
Start: 2023-04-26

## (undated) RX ORDER — ONDANSETRON 2 MG/ML
INJECTION INTRAMUSCULAR; INTRAVENOUS
Status: DISPENSED
Start: 2023-04-26

## (undated) RX ORDER — ACETAMINOPHEN 325 MG/1
TABLET ORAL
Status: DISPENSED
Start: 2019-04-23

## (undated) RX ORDER — CEFAZOLIN SODIUM IN 0.9 % NACL 3 G/100 ML
INTRAVENOUS SOLUTION, PIGGYBACK (ML) INTRAVENOUS
Status: DISPENSED
Start: 2019-04-23

## (undated) RX ORDER — NEOSTIGMINE METHYLSULFATE 1 MG/ML
VIAL (ML) INJECTION
Status: DISPENSED
Start: 2019-04-23

## (undated) RX ORDER — CEFAZOLIN SODIUM/WATER 3 G/30 ML
SYRINGE (ML) INTRAVENOUS
Status: DISPENSED
Start: 2023-04-26

## (undated) RX ORDER — BUPIVACAINE HYDROCHLORIDE 2.5 MG/ML
INJECTION, SOLUTION EPIDURAL; INFILTRATION; INTRACAUDAL
Status: DISPENSED
Start: 2023-04-26

## (undated) RX ORDER — KETOROLAC TROMETHAMINE 30 MG/ML
INJECTION, SOLUTION INTRAMUSCULAR; INTRAVENOUS
Status: DISPENSED
Start: 2019-04-23

## (undated) RX ORDER — OXYCODONE HYDROCHLORIDE 5 MG/1
TABLET ORAL
Status: DISPENSED
Start: 2019-04-23

## (undated) RX ORDER — TRIAMCINOLONE ACETONIDE 40 MG/ML
INJECTION, SUSPENSION INTRA-ARTICULAR; INTRAMUSCULAR
Status: DISPENSED
Start: 2022-05-13

## (undated) RX ORDER — DIAZEPAM 5 MG
TABLET ORAL
Status: DISPENSED
Start: 2022-05-16

## (undated) RX ORDER — BUPIVACAINE HYDROCHLORIDE 5 MG/ML
INJECTION, SOLUTION EPIDURAL; INTRACAUDAL
Status: DISPENSED
Start: 2022-05-13

## (undated) RX ORDER — HEPARIN SODIUM 5000 [USP'U]/.5ML
INJECTION, SOLUTION INTRAVENOUS; SUBCUTANEOUS
Status: DISPENSED
Start: 2023-04-26

## (undated) RX ORDER — HYDROMORPHONE HYDROCHLORIDE 1 MG/ML
INJECTION, SOLUTION INTRAMUSCULAR; INTRAVENOUS; SUBCUTANEOUS
Status: DISPENSED
Start: 2019-04-23